# Patient Record
Sex: FEMALE | Race: WHITE | NOT HISPANIC OR LATINO | Employment: FULL TIME
[De-identification: names, ages, dates, MRNs, and addresses within clinical notes are randomized per-mention and may not be internally consistent; named-entity substitution may affect disease eponyms.]

---

## 2017-08-19 ENCOUNTER — HEALTH MAINTENANCE LETTER (OUTPATIENT)
Age: 27
End: 2017-08-19

## 2019-11-06 ENCOUNTER — HEALTH MAINTENANCE LETTER (OUTPATIENT)
Age: 29
End: 2019-11-06

## 2020-11-29 ENCOUNTER — HEALTH MAINTENANCE LETTER (OUTPATIENT)
Age: 30
End: 2020-11-29

## 2021-09-19 ENCOUNTER — HEALTH MAINTENANCE LETTER (OUTPATIENT)
Age: 31
End: 2021-09-19

## 2022-01-09 ENCOUNTER — HEALTH MAINTENANCE LETTER (OUTPATIENT)
Age: 32
End: 2022-01-09

## 2022-09-29 ENCOUNTER — HOSPITAL ENCOUNTER (EMERGENCY)
Facility: CLINIC | Age: 32
Discharge: HOME | End: 2022-09-30
Attending: EMERGENCY MEDICINE | Admitting: EMERGENCY MEDICINE
Payer: COMMERCIAL

## 2022-09-29 ENCOUNTER — APPOINTMENT (OUTPATIENT)
Dept: ULTRASOUND IMAGING | Facility: CLINIC | Age: 32
End: 2022-09-29
Attending: EMERGENCY MEDICINE
Payer: COMMERCIAL

## 2022-09-29 DIAGNOSIS — R55 NEAR SYNCOPE: ICD-10-CM

## 2022-09-29 DIAGNOSIS — R10.32 CHRONIC GROIN PAIN, LEFT: ICD-10-CM

## 2022-09-29 DIAGNOSIS — G89.29 CHRONIC GROIN PAIN, LEFT: ICD-10-CM

## 2022-09-29 DIAGNOSIS — N92.1 METRORRHAGIA: ICD-10-CM

## 2022-09-29 LAB
ALBUMIN SERPL BCG-MCNC: 3.9 G/DL (ref 3.5–5.2)
ALP SERPL-CCNC: 89 U/L (ref 35–104)
ALT SERPL W P-5'-P-CCNC: 14 U/L (ref 10–35)
ANION GAP SERPL CALCULATED.3IONS-SCNC: 11 MMOL/L (ref 7–15)
AST SERPL W P-5'-P-CCNC: 20 U/L (ref 10–35)
BASOPHILS # BLD AUTO: 0.1 10E3/UL (ref 0–0.2)
BASOPHILS NFR BLD AUTO: 1 %
BILIRUB SERPL-MCNC: 0.3 MG/DL
BUN SERPL-MCNC: 8 MG/DL (ref 6–20)
CALCIUM SERPL-MCNC: 8.6 MG/DL (ref 8.6–10)
CHLORIDE SERPL-SCNC: 102 MMOL/L (ref 98–107)
CREAT SERPL-MCNC: 0.69 MG/DL (ref 0.51–0.95)
DEPRECATED HCO3 PLAS-SCNC: 24 MMOL/L (ref 22–29)
EOSINOPHIL # BLD AUTO: 0.2 10E3/UL (ref 0–0.7)
EOSINOPHIL NFR BLD AUTO: 2 %
ERYTHROCYTE [DISTWIDTH] IN BLOOD BY AUTOMATED COUNT: 12.3 % (ref 10–15)
GFR SERPL CREATININE-BSD FRML MDRD: >90 ML/MIN/1.73M2
GLUCOSE SERPL-MCNC: 97 MG/DL (ref 70–99)
HCG SERPL QL: NEGATIVE
HCT VFR BLD AUTO: 37.6 % (ref 35–47)
HGB BLD-MCNC: 12.4 G/DL (ref 11.7–15.7)
IMM GRANULOCYTES # BLD: 0 10E3/UL
IMM GRANULOCYTES NFR BLD: 0 %
LYMPHOCYTES # BLD AUTO: 2.5 10E3/UL (ref 0.8–5.3)
LYMPHOCYTES NFR BLD AUTO: 30 %
MAGNESIUM SERPL-MCNC: 2 MG/DL (ref 1.7–2.3)
MCH RBC QN AUTO: 28.4 PG (ref 26.5–33)
MCHC RBC AUTO-ENTMCNC: 33 G/DL (ref 31.5–36.5)
MCV RBC AUTO: 86 FL (ref 78–100)
MONOCYTES # BLD AUTO: 0.5 10E3/UL (ref 0–1.3)
MONOCYTES NFR BLD AUTO: 6 %
NEUTROPHILS # BLD AUTO: 5 10E3/UL (ref 1.6–8.3)
NEUTROPHILS NFR BLD AUTO: 61 %
NRBC # BLD AUTO: 0 10E3/UL
NRBC BLD AUTO-RTO: 0 /100
PLATELET # BLD AUTO: 358 10E3/UL (ref 150–450)
POTASSIUM SERPL-SCNC: 4.7 MMOL/L (ref 3.4–5.3)
PROT SERPL-MCNC: 6.6 G/DL (ref 6.4–8.3)
RBC # BLD AUTO: 4.36 10E6/UL (ref 3.8–5.2)
SODIUM SERPL-SCNC: 137 MMOL/L (ref 136–145)
TSH SERPL DL<=0.005 MIU/L-ACNC: 3.93 UIU/ML (ref 0.3–4.2)
WBC # BLD AUTO: 8.2 10E3/UL (ref 4–11)

## 2022-09-29 PROCEDURE — 80053 COMPREHEN METABOLIC PANEL: CPT | Performed by: EMERGENCY MEDICINE

## 2022-09-29 PROCEDURE — 258N000003 HC RX IP 258 OP 636: Performed by: EMERGENCY MEDICINE

## 2022-09-29 PROCEDURE — 84703 CHORIONIC GONADOTROPIN ASSAY: CPT | Performed by: EMERGENCY MEDICINE

## 2022-09-29 PROCEDURE — 96360 HYDRATION IV INFUSION INIT: CPT | Performed by: EMERGENCY MEDICINE

## 2022-09-29 PROCEDURE — 85025 COMPLETE CBC W/AUTO DIFF WBC: CPT | Performed by: EMERGENCY MEDICINE

## 2022-09-29 PROCEDURE — 93971 EXTREMITY STUDY: CPT | Mod: LT

## 2022-09-29 PROCEDURE — 93010 ELECTROCARDIOGRAM REPORT: CPT | Performed by: EMERGENCY MEDICINE

## 2022-09-29 PROCEDURE — 96361 HYDRATE IV INFUSION ADD-ON: CPT | Performed by: EMERGENCY MEDICINE

## 2022-09-29 PROCEDURE — 93005 ELECTROCARDIOGRAM TRACING: CPT | Performed by: EMERGENCY MEDICINE

## 2022-09-29 PROCEDURE — 99285 EMERGENCY DEPT VISIT HI MDM: CPT | Mod: 25 | Performed by: EMERGENCY MEDICINE

## 2022-09-29 PROCEDURE — 36415 COLL VENOUS BLD VENIPUNCTURE: CPT | Performed by: EMERGENCY MEDICINE

## 2022-09-29 PROCEDURE — 83735 ASSAY OF MAGNESIUM: CPT | Performed by: EMERGENCY MEDICINE

## 2022-09-29 PROCEDURE — 84443 ASSAY THYROID STIM HORMONE: CPT | Performed by: EMERGENCY MEDICINE

## 2022-09-29 RX ADMIN — SODIUM CHLORIDE 1000 ML: 9 INJECTION, SOLUTION INTRAVENOUS at 21:48

## 2022-09-29 ASSESSMENT — ACTIVITIES OF DAILY LIVING (ADL)
ADLS_ACUITY_SCORE: 33
ADLS_ACUITY_SCORE: 35

## 2022-09-29 NOTE — ED TRIAGE NOTES
Left hip pain, radiates up the back and down the leg, known issues plans to have surgery; concerned because today had an episode of near-syncope, stood up out of the car and began walking, felt light headed, increased heart rate     Triage Assessment     Row Name 09/29/22 6579       Triage Assessment (Adult)    Airway WDL WDL       Cardiac WDL    Cardiac WDL WDL       Cognitive/Neuro/Behavioral WDL    Cognitive/Neuro/Behavioral WDL WDL

## 2022-09-30 VITALS
HEART RATE: 84 BPM | OXYGEN SATURATION: 100 % | SYSTOLIC BLOOD PRESSURE: 105 MMHG | HEIGHT: 62 IN | BODY MASS INDEX: 44.53 KG/M2 | RESPIRATION RATE: 18 BRPM | DIASTOLIC BLOOD PRESSURE: 60 MMHG | TEMPERATURE: 98.2 F | WEIGHT: 242 LBS

## 2022-09-30 RX ORDER — TRAMADOL HYDROCHLORIDE 50 MG/1
50 TABLET ORAL EVERY 6 HOURS PRN
Qty: 15 TABLET | Refills: 0 | Status: SHIPPED | OUTPATIENT
Start: 2022-09-30 | End: 2023-06-02

## 2022-09-30 NOTE — ED PROVIDER NOTES
History     Chief Complaint   Patient presents with     Hip Pain     Left hip pain, radiates up the back and down the leg, known issues plans to have surgery; concerned because today had an episode of near-syncope, stood up out of the car and began walking, felt light headed, increased heart rate     HPI  Paula Back is a 32 year old female with history of anxiety, depression and 10 + years of left groin pain radiating to the left leg and foot with referral to the ED by her PCC where she presented for pre-op eval for tubal ligation and had an episode of near syncope when she approached the desk at clinic. Vision darkened from periphery. No syncope, felt like heart was racing and it was 120. She felt foggy and confused and scared. EKG at the clinic was reported to be NL.  She was referred to the emergency department for further evaluation. Groin pain is dull, aching, throbbing, sharp, worsened in the past 4 days, with no known precipitant for its acute aggravation/exacerbation.  Pain radiates to the medial left upper leg and posterior left lower leg and to the left foot.  Pain also radiates to the left low back.  Pain is constant, severe and refractory to OTC analgesics.  She had difficulty sleeping last night secondary to pain.  She mentions that prior imaging studies showed evidence of phleboliths and she is concerned that the pain rating into the left leg, which she has had for 10 years, is secondary to a blood clot.  No leg swelling, redness, chest pain, cough, hemoptysis or shortness of breath. She just had an MRI study for these chronic symptoms ([facility] facility, ? pelvic MRI), but results not yet available (I reviewed Care Everywhere).  She also reports 4 days of painless uterine/vaginal bleeding with passage of clots. LMP 9/7/22 = NL.    Allergies:  No Known Allergies    Problem List:    Patient Active Problem List    Diagnosis Date Noted     Moderate major depression (H) 04/26/2013     Priority:  Medium     Generalized anxiety disorder 04/26/2013     Priority: Medium     Diagnosis updated by automated process. Provider to review and confirm.       Obese 02/06/2013     Priority: Medium     SOB (shortness of breath) 07/24/2012     Priority: Medium     24 hour contact handout given 07/24/2012     Priority: Medium     EMERGENCY CARE PLAN  Presenting Problem Signs and Symptoms Treatment Plan    Questions or conerns during clinic hours    I will call the clinic directly     Questions or conerns outside clinic hours    I will call the 24 hour nurse line at [phone]    Patient needs to schedule an appointment    I will call the 24 hour scheduling team at [phone] or clinic directly    Same day treatment     I will call the clinic first, nurse line if after hours, urgent care and express care if needed                                 CARDIOVASCULAR SCREENING; LDL GOAL LESS THAN 160 12/28/2011     Priority: Medium     Insomnia 05/17/2011     Priority: Medium     Advanced directives, counseling/discussion 03/25/2011     Priority: Medium     Patient does not have an Advance/Health Care Directive (HCD), declines information/referral.    Julia Meyer  March 25, 2011         Papanicolaou smear of cervix with low grade squamous intraepithelial lesion (LGSIL) 09/20/2009     Priority: Medium     9/16/09:Pap--LSIL. Per ASCCP guidelines, repeat pap 1 year.  11/2010:Per current guidelines, no further pap testing needed until age 21.  Being followed at [facility]  12/21/11:Pap--NIL  2/6/13:Pap--NIL  1/15/14: Pap - NIL. Repeat pap 3 yrs.        Family hx of alcoholism 04/06/2009     Priority: Medium     Both parents       Acne 11/12/2008     Priority: Medium     Contraceptive management 07/24/2006     Priority: Medium     Problem list name updated by automated process. Provider to review          Past Medical History:    History reviewed. No pertinent past medical history.    Past Surgical History:    History  "reviewed. No pertinent surgical history.    Family History:    Family History   Problem Relation Age of Onset     Diabetes Maternal Grandfather      Cancer Maternal Grandmother         lung      Cancer Maternal Grandfather         father     Alcohol/Drug Father      Alcohol/Drug Mother        Social History:  Marital Status:   [2]  Social History     Tobacco Use     Smoking status: Former Smoker     Packs/day: 0.25     Types: Cigarettes     Smokeless tobacco: Never Used     Tobacco comment: she is not exposed to tobacco at home   Substance Use Topics     Alcohol use: Yes     Comment: occas.  10 drinks through the week     Drug use: No        Medications:    traMADol (ULTRAM) 50 MG tablet  albuterol (PROAIR HFA, PROVENTIL HFA, VENTOLIN HFA) 108 (90 BASE) MCG/ACT inhaler  citalopram (CELEXA) 20 MG tablet  Ibuprofen 200 MG capsule  MAGNESIUM PO  melatonin 5 MG tablet  Multiple Vitamin (MULTIVITAMINS PO)  predniSONE (DELTASONE) 20 MG tablet  Pseudoephedrine-DM-GG-APAP (DAYQUIL LIQUICAPS OR)  RA KRILL  MG CAPS        Review of Systems  As mentioned above in the history present illness.  All other systems were reviewed and are negative.    Physical Exam   BP: 133/89  Pulse: 92  Temp: 98.2  F (36.8  C)  Resp: 16  Height: 157.5 cm (5' 2\")  Weight: 109.8 kg (242 lb)  SpO2: 100 %  Lying Orthostatic BP: 100/66  Lying Orthostatic Pulse: 94 bpm  Sitting Orthostatic BP: 121/79  Sitting Orthostatic Pulse: 79 bpm  Standing Orthostatic BP: 117/78  Standing Orthostatic Pulse: 88 bpm      Physical Exam  Vitals and nursing note reviewed.   Constitutional:       General: She is not in acute distress.     Appearance: Normal appearance. She is well-developed. She is not ill-appearing or diaphoretic.   HENT:      Head: Normocephalic and atraumatic.      Right Ear: External ear normal.      Left Ear: External ear normal.      Nose: Nose normal.      Mouth/Throat:      Mouth: Mucous membranes are moist.   Eyes:      General: No " scleral icterus.     Extraocular Movements: Extraocular movements intact.      Conjunctiva/sclera: Conjunctivae normal.   Neck:      Trachea: No tracheal deviation.   Cardiovascular:      Rate and Rhythm: Normal rate and regular rhythm.      Heart sounds: Normal heart sounds. No murmur heard.    No friction rub. No gallop.   Pulmonary:      Effort: Pulmonary effort is normal. No respiratory distress.      Breath sounds: Normal breath sounds. No rales.   Abdominal:      General: There is no distension.      Palpations: Abdomen is soft.      Tenderness: There is no abdominal tenderness. There is no right CVA tenderness or left CVA tenderness.   Musculoskeletal:         General: Tenderness ( poorly locaized left LBP and left groin pain with no STS, erthema, warmth, adenopathy or pulse abnlty) present. No swelling. Normal range of motion.      Cervical back: Normal range of motion and neck supple.      Right lower leg: No edema.      Left lower leg: No edema (no LLE STS, cords or abnlty).   Skin:     General: Skin is warm and dry.      Coloration: Skin is not pale.      Findings: No erythema or rash.   Neurological:      General: No focal deficit present.      Mental Status: She is alert and oriented to person, place, and time.      Sensory: No sensory deficit.      Motor: No weakness.   Psychiatric:         Mood and Affect: Mood normal.         Behavior: Behavior normal.         ED Course             Procedures         EKG Interpretation:      Interpreted by Clayton Padilla MD  Time reviewed: Upon completion  Symptoms at time of EKG: None status post near syncopal episode  Rhythm: Normal sinus   Rate: Normal  Axis: Normal  Ectopy: None  Conduction: Normal  ST Segments/ T Waves: No ST-T wave changes and No acute ischemic changes  Q Waves: None  Comparison to prior: No old EKG available  Clinical Impression: normal EKG              Results for orders placed or performed during the hospital encounter of 09/29/22 (from  the past 24 hour(s))   HCG qualitative Blood   Result Value Ref Range    hCG Serum Qualitative Negative Negative   CBC with platelets differential    Narrative    The following orders were created for panel order CBC with platelets differential.  Procedure                               Abnormality         Status                     ---------                               -----------         ------                     CBC with platelets and d...[[id number]]                      Final result                 Please view results for these tests on the individual orders.   Comprehensive metabolic panel   Result Value Ref Range    Sodium 137 136 - 145 mmol/L    Potassium 4.7 3.4 - 5.3 mmol/L    Chloride 102 98 - 107 mmol/L    Carbon Dioxide (CO2) 24 22 - 29 mmol/L    Anion Gap 11 7 - 15 mmol/L    Urea Nitrogen 8.0 6.0 - 20.0 mg/dL    Creatinine 0.69 0.51 - 0.95 mg/dL    Calcium 8.6 8.6 - 10.0 mg/dL    Glucose 97 70 - 99 mg/dL    Alkaline Phosphatase 89 35 - 104 U/L    AST 20 10 - 35 U/L    ALT 14 10 - 35 U/L    Protein Total 6.6 6.4 - 8.3 g/dL    Albumin 3.9 3.5 - 5.2 g/dL    Bilirubin Total 0.3 <=1.2 mg/dL    GFR Estimate >90 >60 mL/min/1.73m2   Magnesium   Result Value Ref Range    Magnesium 2.0 1.7 - 2.3 mg/dL   TSH with free T4 reflex   Result Value Ref Range    TSH 3.93 0.30 - 4.20 uIU/mL   CBC with platelets and differential   Result Value Ref Range    WBC Count 8.2 4.0 - 11.0 10e3/uL    RBC Count 4.36 3.80 - 5.20 10e6/uL    Hemoglobin 12.4 11.7 - 15.7 g/dL    Hematocrit 37.6 35.0 - 47.0 %    MCV 86 78 - 100 fL    MCH 28.4 26.5 - 33.0 pg    MCHC 33.0 31.5 - 36.5 g/dL    RDW 12.3 10.0 - 15.0 %    Platelet Count 358 150 - 450 10e3/uL    % Neutrophils 61 %    % Lymphocytes 30 %    % Monocytes 6 %    % Eosinophils 2 %    % Basophils 1 %    % Immature Granulocytes 0 %    NRBCs per 100 WBC 0 <1 /100    Absolute Neutrophils 5.0 1.6 - 8.3 10e3/uL    Absolute Lymphocytes 2.5 0.8 - 5.3 10e3/uL    Absolute Monocytes 0.5 0.0 -  1.3 10e3/uL    Absolute Eosinophils 0.2 0.0 - 0.7 10e3/uL    Absolute Basophils 0.1 0.0 - 0.2 10e3/uL    Absolute Immature Granulocytes 0.0 <=0.4 10e3/uL    Absolute NRBCs 0.0 10e3/uL   US Lower Extremity Venous Duplex Left    Narrative    EXAM: US LOWER EXTREMITY VENOUS DUPLEX LEFT  LOCATION: [facility]  DATE/TIME: 9/29/2022 10:31 PM    INDICATION: atraumatic LLE pain  COMPARISON: None.  TECHNIQUE: Venous Duplex ultrasound of the left lower extremity with and without compression, augmentation and duplex. Color flow and spectral Doppler with waveform analysis performed.    FINDINGS: Exam includes the common femoral, femoral, popliteal, and contralateral common femoral veins as well as segmentally visualized deep calf veins and greater saphenous vein.     LEFT: No deep vein thrombosis. No superficial thrombophlebitis. No popliteal cyst.      Impression    IMPRESSION:  1.  No deep venous thrombosis in the left lower extremity.       Medications   0.9% sodium chloride BOLUS (0 mLs Intravenous Stopped 9/30/22 0025)     At time of discharge she reports that the vaginal/uterine bleeding is significant subsided and is minimal now.  We will defer treatment of this, she is comfortable with this.     Assessments & Plan (with Medical Decision Making)   32 year old female with history of anxiety, depression and 10 + years of left groin pain radiating to the left leg and foot with referral to the ED by her PCC where she presented for pre-op eval for tubal ligation and had an episode of near syncope when she approached the desk at clinic. Vision darkened from periphery. No syncope, felt like heart was racing and it was 120. She felt foggy and confused and scared. EKG at the clinic was reported to be NL.  She was referred to the emergency department for further evaluation. ED eval unremarkable and I believe this was a vasovagal near syncopal episode. Doubt atypical ACS, dysrhythmia, PE/DVT, seizure,  TIA, etc. She has chronic groin pain of unclear etiology and exam benign. She states prior imaging studies showed evidence of phleboliths and she is concerned that the pain rating into the left leg, which she has had for 10 years, is secondary to a blood clot. She just had an MRI study for these chronic symptoms ([facility] facility, ? pelvic MRI), but results not yet available (I reviewed Care Everywhere). Today LLE US is negative for DVT. She also reports 4 days of uterine/vaginal bleeding with passage of clots. LMP 9/7/22 = NL. At time of discharge she reports that the vaginal/uterine bleeding is significant subsided and is minimal now.  We will defer treatment of this, and she will f/u with her PMD and Ob/GYN.  She was provided instructions for supportive care and will return as needed for worsened condition or worsening symptoms, or new problems or concerns. Will rx a small # of Tramadol for pain, per her request.    I have reviewed the nursing notes.    I have reviewed the findings, diagnosis, plan and need for follow up with the patient.    New Prescriptions    TRAMADOL (ULTRAM) 50 MG TABLET    Take 1 tablet (50 mg) by mouth every 6 hours as needed for severe pain       Final diagnoses:   Near syncope   Chronic groin pain, left - Radiating into the left leg and left low back   Metrorrhagia       9/29/2022   [facility] EMERGENCY DEPT     Valerie, Clayton MUHAMMAD MD  10/02/22 0842

## 2022-11-21 ENCOUNTER — HEALTH MAINTENANCE LETTER (OUTPATIENT)
Age: 32
End: 2022-11-21

## 2023-02-24 ENCOUNTER — HOSPITAL ENCOUNTER (EMERGENCY)
Facility: CLINIC | Age: 33
Discharge: HOME | End: 2023-02-24
Attending: EMERGENCY MEDICINE | Admitting: EMERGENCY MEDICINE
Payer: COMMERCIAL

## 2023-02-24 ENCOUNTER — APPOINTMENT (OUTPATIENT)
Dept: CT IMAGING | Facility: CLINIC | Age: 33
End: 2023-02-24
Attending: EMERGENCY MEDICINE
Payer: COMMERCIAL

## 2023-02-24 ENCOUNTER — TELEPHONE (OUTPATIENT)
Dept: OTOLARYNGOLOGY | Facility: CLINIC | Age: 33
End: 2023-02-24

## 2023-02-24 VITALS
WEIGHT: 242 LBS | HEART RATE: 107 BPM | OXYGEN SATURATION: 94 % | DIASTOLIC BLOOD PRESSURE: 91 MMHG | SYSTOLIC BLOOD PRESSURE: 120 MMHG | HEIGHT: 62 IN | TEMPERATURE: 98.6 F | RESPIRATION RATE: 16 BRPM | BODY MASS INDEX: 44.53 KG/M2

## 2023-02-24 DIAGNOSIS — J02.9 PHARYNGITIS, UNSPECIFIED ETIOLOGY: ICD-10-CM

## 2023-02-24 DIAGNOSIS — M54.2 NECK PAIN: ICD-10-CM

## 2023-02-24 DIAGNOSIS — R09.A2 GLOBUS SENSATION: ICD-10-CM

## 2023-02-24 DIAGNOSIS — R13.10 DYSPHAGIA, UNSPECIFIED TYPE: Primary | ICD-10-CM

## 2023-02-24 LAB
ANION GAP SERPL CALCULATED.3IONS-SCNC: 9 MMOL/L (ref 7–15)
BASOPHILS # BLD AUTO: 0 10E3/UL (ref 0–0.2)
BASOPHILS NFR BLD AUTO: 1 %
BUN SERPL-MCNC: 5.3 MG/DL (ref 6–20)
CALCIUM SERPL-MCNC: 8.9 MG/DL (ref 8.6–10)
CHLORIDE SERPL-SCNC: 103 MMOL/L (ref 98–107)
CREAT SERPL-MCNC: 0.71 MG/DL (ref 0.51–0.95)
DEPRECATED HCO3 PLAS-SCNC: 25 MMOL/L (ref 22–29)
EOSINOPHIL # BLD AUTO: 0.1 10E3/UL (ref 0–0.7)
EOSINOPHIL NFR BLD AUTO: 2 %
ERYTHROCYTE [DISTWIDTH] IN BLOOD BY AUTOMATED COUNT: 12.6 % (ref 10–15)
GFR SERPL CREATININE-BSD FRML MDRD: >90 ML/MIN/1.73M2
GLUCOSE SERPL-MCNC: 94 MG/DL (ref 70–99)
HCT VFR BLD AUTO: 34.5 % (ref 35–47)
HGB BLD-MCNC: 12.1 G/DL (ref 11.7–15.7)
IMM GRANULOCYTES # BLD: 0 10E3/UL
IMM GRANULOCYTES NFR BLD: 0 %
LYMPHOCYTES # BLD AUTO: 2.4 10E3/UL (ref 0.8–5.3)
LYMPHOCYTES NFR BLD AUTO: 33 %
MCH RBC QN AUTO: 30 PG (ref 26.5–33)
MCHC RBC AUTO-ENTMCNC: 35.1 G/DL (ref 31.5–36.5)
MCV RBC AUTO: 85 FL (ref 78–100)
MONOCYTES # BLD AUTO: 0.5 10E3/UL (ref 0–1.3)
MONOCYTES NFR BLD AUTO: 7 %
NEUTROPHILS # BLD AUTO: 4 10E3/UL (ref 1.6–8.3)
NEUTROPHILS NFR BLD AUTO: 57 %
NRBC # BLD AUTO: 0 10E3/UL
NRBC BLD AUTO-RTO: 0 /100
PLATELET # BLD AUTO: 339 10E3/UL (ref 150–450)
POTASSIUM SERPL-SCNC: 4.3 MMOL/L (ref 3.4–5.3)
RBC # BLD AUTO: 4.04 10E6/UL (ref 3.8–5.2)
SODIUM SERPL-SCNC: 137 MMOL/L (ref 136–145)
WBC # BLD AUTO: 7 10E3/UL (ref 4–11)

## 2023-02-24 PROCEDURE — 250N000011 HC RX IP 250 OP 636: Performed by: EMERGENCY MEDICINE

## 2023-02-24 PROCEDURE — 85025 COMPLETE CBC W/AUTO DIFF WBC: CPT | Performed by: EMERGENCY MEDICINE

## 2023-02-24 PROCEDURE — 96375 TX/PRO/DX INJ NEW DRUG ADDON: CPT | Mod: 59 | Performed by: EMERGENCY MEDICINE

## 2023-02-24 PROCEDURE — 36415 COLL VENOUS BLD VENIPUNCTURE: CPT | Performed by: EMERGENCY MEDICINE

## 2023-02-24 PROCEDURE — 80048 BASIC METABOLIC PNL TOTAL CA: CPT | Performed by: EMERGENCY MEDICINE

## 2023-02-24 PROCEDURE — 96374 THER/PROPH/DIAG INJ IV PUSH: CPT | Mod: 59 | Performed by: EMERGENCY MEDICINE

## 2023-02-24 PROCEDURE — 99285 EMERGENCY DEPT VISIT HI MDM: CPT | Mod: 25 | Performed by: EMERGENCY MEDICINE

## 2023-02-24 PROCEDURE — 70491 CT SOFT TISSUE NECK W/DYE: CPT

## 2023-02-24 PROCEDURE — 99207 PR NO CHARGE LOS: CPT | Performed by: OTOLARYNGOLOGY

## 2023-02-24 PROCEDURE — 99284 EMERGENCY DEPT VISIT MOD MDM: CPT | Performed by: EMERGENCY MEDICINE

## 2023-02-24 PROCEDURE — 250N000009 HC RX 250: Performed by: EMERGENCY MEDICINE

## 2023-02-24 RX ORDER — LORAZEPAM 2 MG/ML
1 INJECTION INTRAMUSCULAR ONCE
Status: COMPLETED | OUTPATIENT
Start: 2023-02-24 | End: 2023-02-24

## 2023-02-24 RX ORDER — DEXAMETHASONE SODIUM PHOSPHATE 4 MG/ML
10 VIAL (ML) INJECTION ONCE
Status: COMPLETED | OUTPATIENT
Start: 2023-02-24 | End: 2023-02-24

## 2023-02-24 RX ORDER — IOPAMIDOL 755 MG/ML
90 INJECTION, SOLUTION INTRAVASCULAR ONCE
Status: COMPLETED | OUTPATIENT
Start: 2023-02-24 | End: 2023-02-24

## 2023-02-24 RX ORDER — KETOROLAC TROMETHAMINE 15 MG/ML
15 INJECTION, SOLUTION INTRAMUSCULAR; INTRAVENOUS ONCE
Status: COMPLETED | OUTPATIENT
Start: 2023-02-24 | End: 2023-02-24

## 2023-02-24 RX ADMIN — DEXAMETHASONE SODIUM PHOSPHATE 10 MG: 4 INJECTION, SOLUTION INTRAMUSCULAR; INTRAVENOUS at 03:37

## 2023-02-24 RX ADMIN — LORAZEPAM 1 MG: 2 INJECTION INTRAMUSCULAR; INTRAVENOUS at 05:30

## 2023-02-24 RX ADMIN — SODIUM CHLORIDE 80 ML: 9 INJECTION, SOLUTION INTRAVENOUS at 02:33

## 2023-02-24 RX ADMIN — IOPAMIDOL 90 ML: 755 INJECTION, SOLUTION INTRAVENOUS at 02:33

## 2023-02-24 RX ADMIN — KETOROLAC TROMETHAMINE 15 MG: 15 INJECTION, SOLUTION INTRAMUSCULAR; INTRAVENOUS at 02:26

## 2023-02-24 ASSESSMENT — ACTIVITIES OF DAILY LIVING (ADL)
ADLS_ACUITY_SCORE: 35

## 2023-02-24 ASSESSMENT — ENCOUNTER SYMPTOMS
LIGHT-HEADEDNESS: 0
FEVER: 0
CHILLS: 0
FATIGUE: 0
APPETITE CHANGE: 0
HEADACHES: 0
SORE THROAT: 1
TROUBLE SWALLOWING: 1
ABDOMINAL PAIN: 0
BACK PAIN: 0
CHEST TIGHTNESS: 0
VOICE CHANGE: 1

## 2023-02-24 NOTE — TELEPHONE ENCOUNTER
[facility] Call Center    Phone Message    May a detailed message be left on voicemail: no     Reason for Call: Question regarding specialist protocol  Please follow protocols- only utilize this documentation for questions or concerns that are not clear in the protocol.  Contact clinic directly to clarify question(s) via phone or [employer] message.     Was Clinic Available: no    Question regarding protocol:  Neck pain / Neck pain, abnormal CT of neck     Is there a referral for the requested specialist/specialty? Yes    Name of referring provider: Sachin West MD    Location of referring provider: see above      Action Taken: Message routed to:  [facility] winnie ent    Travel Screening: Not Applicable

## 2023-02-24 NOTE — ED NOTES
"Writer went into pt's room to meet her for the morning. Pt was crying, stating that the ENT doctor reported assessment showed acid reflux. Pt states that she is still having throat pain and difficulty swallowing and that she knows \"this is not reflux\". Pt reports that she does not feel comfortable being discharged and will \"just go to another ER\".   MD notified and will speak with pt.   "

## 2023-02-24 NOTE — OP NOTE
Chief Complaint   Patient presents with     Neck Pain     Pain in throat-  if she relaxes her neck muscles it travels down he neck     History of Present Illness  Paula Back is a 32 year old female who presented to the emergency department early this morning with throat pain and trouble swallowing for the past 3 days.  Her past history is significant for recently identified Chiari malformation.  She has an upcoming appoint with neurology at [facility] in about 1 month.  She is able to swallow her secretions.  She is been able to stay hydrated, liquid seem to be fine.  She reports throat pain and discomfort, throat fullness, and sense of a lump in her throat.  Denies a history of acid indigestion or heartburn.  She feels like she gets chest pressure when she lays down at night to sleep.  Her BMI today is 44 kg/m .  She denies any otalgia, hemoptysis, obvious external neck lumps/bumps/swelling.  No history of unintentional weight loss.  She is a nontobacco user.    The patient underwent CT imaging of the neck with contrast performed earlier today.  My review of the neck CT with contrast shows a normal upper aerodigestive tract without any masses or lesions.  Normal pharyngeal and oropharyngeal tonsil tissue.  Normal epiglottis and supraglottic structures.  No signs of neck mass or adenopathy.    Past Medical History  Patient Active Problem List   Diagnosis     Contraceptive management     Acne     Family hx of alcoholism     Papanicolaou smear of cervix with low grade squamous intraepithelial lesion (LGSIL)     Advanced directives, counseling/discussion     Insomnia     CARDIOVASCULAR SCREENING; LDL GOAL LESS THAN 160     SOB (shortness of breath)     24 hour contact handout given     Obese     Moderate major depression (H)     Generalized anxiety disorder     Current Medications  No current facility-administered medications for this encounter.    Current Outpatient Medications:      albuterol (PROAIR HFA,  PROVENTIL HFA, VENTOLIN HFA) 108 (90 BASE) MCG/ACT inhaler, Inhale 2 puffs into the lungs every 6 hours as needed for shortness of breath / dyspnea or wheezing, Disp: 1 Inhaler, Rfl: 0     citalopram (CELEXA) 20 MG tablet, Take 1.5 tablets (30 mg) by mouth daily DUE for office appt with Reji before any further refills, Disp: 45 tablet, Rfl: 0     Ibuprofen 200 MG capsule, Take 400 mg by mouth every 4 hours as needed for fever., Disp: 100 capsule, Rfl: 0     MAGNESIUM PO, Take by mouth At Bedtime, Disp: , Rfl:      melatonin 5 MG tablet, Take 5 mg by mouth At Bedtime, Disp: , Rfl:      Multiple Vitamin (MULTIVITAMINS PO), Take by mouth daily 1/2 tab, Disp: , Rfl:      predniSONE (DELTASONE) 20 MG tablet, Take 3 tablets (60 mg) by mouth daily, Disp: 15 tablet, Rfl: 0     Pseudoephedrine-DM-GG-APAP (DAYQUIL LIQUICAPS OR), , Disp: , Rfl:      RA KRILL  MG CAPS, , Disp: , Rfl:      traMADol (ULTRAM) 50 MG tablet, Take 1 tablet (50 mg) by mouth every 6 hours as needed for severe pain, Disp: 15 tablet, Rfl: 0    Allergies  No Known Allergies    Social History  Social History     Socioeconomic History     Marital status:    Tobacco Use     Smoking status: Former     Packs/day: 0.25     Types: Cigarettes     Smokeless tobacco: Never     Tobacco comments:     she is not exposed to tobacco at home   Substance and Sexual Activity     Alcohol use: Yes     Comment: occas.  10 drinks through the week     Drug use: No     Sexual activity: Yes     Partners: Male     Birth control/protection: Condom, Injection   Other Topics Concern     Parent/sibling w/ CABG, MI or angioplasty before 65F 55M? No   Social History Narrative    Works at [employer] on 4:30-1 AM shift       Family History  Family History   Problem Relation Age of Onset     Diabetes Maternal Grandfather      Cancer Maternal Grandmother         lung      Cancer Maternal Grandfather         father     Alcohol/Drug Father      Alcohol/Drug Mother   "      Review of Systems  As per HPI and PMHx, otherwise 10 system review including the head and neck, constitutional, eyes, respiratory, GI, skin, neurologic, lymphatic, endocrine, and allergy systems is negative.    Physical Exam  /88   Pulse 96   Temp 98.6  F (37  C) (Oral)   Resp 16   Ht 1.575 m (5' 2\")   Wt 109.8 kg (242 lb)   LMP 02/03/2023 (Approximate)   SpO2 96%   BMI 44.26 kg/m    GENERAL: Patient is a pleasant, cooperative 32 year old female in no acute distress.  HEAD: Normocephalic, atraumatic.  Hair and scalp are normal.  EYES: Pupils are equal, round, reactive to light and accommodation.  Extraocular movements are intact.  The sclera nonicteric without injection.  The extraocular structures are normal.  EARS: Normal shape and symmetry.  No tenderness when palpating the mastoid or tragal areas bilaterally.   NOSE: Nares are patent.  Nasal mucosa is pink and moist.  Negative anterior rhinoscopy.  ORAL CAVITY: Dentition is in good repair.  Mucous membranes are moist.  Tongue is mobile, protrudes to the midline.  Palate elevates symmetrically.  Right tonsil is 2+, left tonsil is 1+.  No erythema or exudate.  No oral cavity or oropharyngeal masses, lesions, ulcerations, leukoplakia.  NECK: Supple, trachea is midline.  There no palpable cervical lymphadenopathy or masses bilaterally.    NEUROLOGIC: Cranial nerves II through XII are grossly intact.  Voice is strong.  Patient is House-Brackmann I/VI bilaterally.  CARDIOVASCULAR: Extremities are warm and well-perfused.  No significant peripheral edema.  RESPIRATORY: Patient has nonlabored breathing without cough, wheeze, stridor.  PSYCHIATRIC: Patient is alert and oriented.  Mood and affect appear normal.  SKIN: Warm and dry.  No scalp, face, or neck lesions noted.    Procedure: Flexible Laryngoscopy  Indication: Dysphagia, throat discomfort, globus    To best visualize the upper airway anatomy and due to the chief complaint and HPI, I proceeded " with flexible fiberoptic laryngoscopy examination.  The bilateral nasal cavities were anesthetized and decongested with a mixture of lidocaine and neosynephrine.  The bilateral nasal cavities were examined using a flexible fiberoptic laryngoscope.  There were no nasal cavity masses, polyps, or mucopurulence bilaterally.  The nasopharynx had a normal appearance with normal Eustachian tube openings and fossa of Rosenmuller bilaterally.  Minimal adenoid tissue.  The patient has moderately severe oropharyngeal cobblestoning that extends down to the piriforms.  The base of tongue, vallecula, epiglottis, aryepiglottic folds, arytenoids, and piriform sinuses were without mass or lesion.  The patient has some moderate interarytenoid thickening and amount amount of posterior edema.  The bilateral true vocal folds were symmetrically mobile without nodules or masses.  At points during the examination, she had some mild paradoxical vocal fold motion.  The visualized portions of the infraglottic and subglottic airway are unremarkable.  The scope was removed.  The patient tolerated the procedure well.                        Assessment and Plan     ICD-10-CM    1. Neck pain  M54.2 Adult ENT  Referral      2. Pharyngitis, unspecified etiology  J02.9         Paula TIERNEY Running today in the emergency department for consultation.  The patient is having symptoms of dysphagia and throat discomfort.  Her endoscopic exam did show some mild cobblestoning which could be consistent with laryngopharyngeal reflux, allergy, or other throat irritation.  It is possible that she could be having swallowing discomfort from her Chiari malformation.  Her CT imaging and endoscopic findings do not show any mass or lesion.  There is no concerns for her safety for discharge.  I did offer her a video swallow study with esophagram as an outpatient to better evaluate her swallow function.  She could consider having this done at [facility] since that is  "where she will be seeing neurology.  I offered her a trial of a proton pump inhibitor given the large incidence of laryngopharyngeal reflux in patients with a BMI of over 40 experiencing globus symptoms.  She declined any medical management for reflux because she feels like this is a \"physical problem\".  I will place an order for a video swallow study with esophagram and she can schedule this with [facility] if she would like and I can review the results when available.  Based on her physical exam and CT findings, I think she be safe to discharge home for outpatient follow-up.    Yon Mitchell MD  Department of Otolaryngology-Head and Neck Surgery  [facility]   "

## 2023-02-24 NOTE — DISCHARGE INSTRUCTIONS
You could try taking famotidine (Pepcid) daily for the next 2 weeks to see if this helps your symptoms.  Return to the emergency department for worsening breathing, inability to swallow, or other concerns.  Follow-up as an outpatient for the swallow study and in clinic for the next steps.

## 2023-02-24 NOTE — ED PROVIDER NOTES
"  History     Chief Complaint   Patient presents with     Neck Pain     Pain in throat-  if she relaxes her neck muscles it travels down he neck     HPI  Paula Back is a 32 year old female with a history of cerebellar tonsillar ectopy presenting for evaluation of neck and throat pain.  Symptoms began abruptly this evening around 11 PM.  Patient reports has been having some chronic neck pain issues for which she has had imaging including an MRI recently.  This pain has been chronically in her posterior neck.  Tonight she developed pain in the anterior neck most prominent on the right side of her neck next to her trachea.  No reported trauma to the area.  Patient reports pain with swallowing as well as with talking tonight since onset of pain.  Denies any known swelling in the area.  No recent food ingestion.  Patient reports it feels as though eating makes food get stuck now although she is able to tolerate fluids.  Denies fevers or chills.  Denies chest pain but does report \"if I relax my neck muscles the pain travels into my chest\".  Denies any difficulty breathing.  No nausea.  Denies previous similar symptoms in the past.      ==================================================================    CHART REVIEW:      MRI Cervical 1/9/23;    Impression    IMPRESSION:   1.  Unremarkable cervical cord.   2.  No high-grade spinal canal stenosis.   3.  At C4-C5, a left lateral/foraminal protrusion contributes to   moderate-to-severe left neural foraminal stenosis.   4.  At C5-C6, there is moderate left neural foraminal stenosis.   5.  Additional degenerative changes as above.   6.  Cerebellar tonsillar ectopia as described.      END CHART REVIEW  ==================================================================      Allergies:  No Known Allergies    Problem List:    Patient Active Problem List    Diagnosis Date Noted     Moderate major depression (H) 04/26/2013     Priority: Medium     Generalized anxiety " disorder 04/26/2013     Priority: Medium     Diagnosis updated by automated process. Provider to review and confirm.       Obese 02/06/2013     Priority: Medium     SOB (shortness of breath) 07/24/2012     Priority: Medium     24 hour contact handout given 07/24/2012     Priority: Medium     EMERGENCY CARE PLAN  Presenting Problem Signs and Symptoms Treatment Plan    Questions or conerns during clinic hours    I will call the clinic directly     Questions or conerns outside clinic hours    I will call the 24 hour nurse line at [phone]    Patient needs to schedule an appointment    I will call the 24 hour scheduling team at [phone] or clinic directly    Same day treatment     I will call the clinic first, nurse line if after hours, urgent care and express care if needed                                 CARDIOVASCULAR SCREENING; LDL GOAL LESS THAN 160 12/28/2011     Priority: Medium     Insomnia 05/17/2011     Priority: Medium     Advanced directives, counseling/discussion 03/25/2011     Priority: Medium     Patient does not have an Advance/Health Care Directive (HCD), declines information/referral.    Julia Meyer  March 25, 2011         Papanicolaou smear of cervix with low grade squamous intraepithelial lesion (LGSIL) 09/20/2009     Priority: Medium     9/16/09:Pap--LSIL. Per ASCCP guidelines, repeat pap 1 year.  11/2010:Per current guidelines, no further pap testing needed until age 21.  Being followed at [facility]  12/21/11:Pap--NIL  2/6/13:Pap--NIL  1/15/14: Pap - NIL. Repeat pap 3 yrs.        Family hx of alcoholism 04/06/2009     Priority: Medium     Both parents       Acne 11/12/2008     Priority: Medium     Contraceptive management 07/24/2006     Priority: Medium     Problem list name updated by automated process. Provider to review          Past Medical History:    No past medical history on file.    Past Surgical History:    No past surgical history on file.    Family History:    Family  "History   Problem Relation Age of Onset     Diabetes Maternal Grandfather      Cancer Maternal Grandmother         lung      Cancer Maternal Grandfather         father     Alcohol/Drug Father      Alcohol/Drug Mother        Social History:  Marital Status:   [2]  Social History     Tobacco Use     Smoking status: Former     Packs/day: 0.25     Types: Cigarettes     Smokeless tobacco: Never     Tobacco comments:     she is not exposed to tobacco at home   Substance Use Topics     Alcohol use: Yes     Comment: occas.  10 drinks through the week     Drug use: No        Medications:    albuterol (PROAIR HFA, PROVENTIL HFA, VENTOLIN HFA) 108 (90 BASE) MCG/ACT inhaler  citalopram (CELEXA) 20 MG tablet  Ibuprofen 200 MG capsule  MAGNESIUM PO  melatonin 5 MG tablet  Multiple Vitamin (MULTIVITAMINS PO)  predniSONE (DELTASONE) 20 MG tablet  Pseudoephedrine-DM-GG-APAP (DAYQUIL LIQUICAPS OR)  RA KRILL  MG CAPS  traMADol (ULTRAM) 50 MG tablet          Review of Systems   Constitutional: Negative for appetite change, chills, fatigue and fever.   HENT: Positive for sore throat, trouble swallowing and voice change (Slightly abnormal). Negative for congestion.    Respiratory: Negative for chest tightness.    Cardiovascular: Negative for chest pain.   Gastrointestinal: Negative for abdominal pain.   Genitourinary: Negative for decreased urine volume.   Musculoskeletal: Negative for back pain.   Neurological: Negative for light-headedness and headaches.   All other systems reviewed and are negative.      Physical Exam   BP: (!) 145/87  Pulse: 96  Temp: 98.6  F (37  C)  Resp: 16  Height: 157.5 cm (5' 2\")  Weight: 109.8 kg (242 lb)  SpO2: 97 %      Physical Exam  Vitals and nursing note reviewed.   Constitutional:       Appearance: Normal appearance. She is not ill-appearing or diaphoretic.   HENT:      Head: Atraumatic.      Nose: Nose normal.   Neck:      Trachea: Tracheal tenderness (Slight tenderness with movement) " present. No tracheal deviation.     Cardiovascular:      Rate and Rhythm: Normal rate.      Pulses: Normal pulses.   Pulmonary:      Effort: Pulmonary effort is normal.   Musculoskeletal:      Cervical back: Neck supple. Tenderness present. No rigidity. Pain with movement present.   Lymphadenopathy:      Cervical: No cervical adenopathy.   Neurological:      Mental Status: She is alert.         ED Course                 Procedures                  Results for orders placed or performed during the hospital encounter of 02/24/23 (from the past 24 hour(s))   CBC with platelets differential    Narrative    The following orders were created for panel order CBC with platelets differential.  Procedure                               Abnormality         Status                     ---------                               -----------         ------                     CBC with platelets and d...[[id number]]  Abnormal            Final result                 Please view results for these tests on the individual orders.   Basic metabolic panel   Result Value Ref Range    Sodium 137 136 - 145 mmol/L    Potassium 4.3 3.4 - 5.3 mmol/L    Chloride 103 98 - 107 mmol/L    Carbon Dioxide (CO2) 25 22 - 29 mmol/L    Anion Gap 9 7 - 15 mmol/L    Urea Nitrogen 5.3 (L) 6.0 - 20.0 mg/dL    Creatinine 0.71 0.51 - 0.95 mg/dL    Calcium 8.9 8.6 - 10.0 mg/dL    Glucose 94 70 - 99 mg/dL    GFR Estimate >90 >60 mL/min/1.73m2   CBC with platelets and differential   Result Value Ref Range    WBC Count 7.0 4.0 - 11.0 10e3/uL    RBC Count 4.04 3.80 - 5.20 10e6/uL    Hemoglobin 12.1 11.7 - 15.7 g/dL    Hematocrit 34.5 (L) 35.0 - 47.0 %    MCV 85 78 - 100 fL    MCH 30.0 26.5 - 33.0 pg    MCHC 35.1 31.5 - 36.5 g/dL    RDW 12.6 10.0 - 15.0 %    Platelet Count 339 150 - 450 10e3/uL    % Neutrophils 57 %    % Lymphocytes 33 %    % Monocytes 7 %    % Eosinophils 2 %    % Basophils 1 %    % Immature Granulocytes 0 %    NRBCs per 100 WBC 0 <1 /100    Absolute  Neutrophils 4.0 1.6 - 8.3 10e3/uL    Absolute Lymphocytes 2.4 0.8 - 5.3 10e3/uL    Absolute Monocytes 0.5 0.0 - 1.3 10e3/uL    Absolute Eosinophils 0.1 0.0 - 0.7 10e3/uL    Absolute Basophils 0.0 0.0 - 0.2 10e3/uL    Absolute Immature Granulocytes 0.0 <=0.4 10e3/uL    Absolute NRBCs 0.0 10e3/uL   Soft tissue neck CT w contrast    Narrative    EXAM: CT SOFT TISSUE NECK W CONTRAST  LOCATION: [facility]  DATE/TIME: 2/24/2023 2:47 AM    INDICATION: Severe neck pain around larynx, more on right of trachea.  COMPARISON: None.  CONTRAST: 90 mL Isovue 370  TECHNIQUE: Routine CT Soft Tissue Neck with IV contrast. Multiplanar reformats. Dose reduction techniques were used.    FINDINGS:     MUCOSAL SPACES/SOFT TISSUES: Normal mucosal spaces of the upper aerodigestive tract. Question mild effacement of the right piriform sinus with mild asymmetric thickening of the right aryepiglottic fold. Otherwise normal vocal cords and infraglottic   trachea. Normal parapharyngeal space and subcutaneous soft tissues. Normal oral cavity,  spaces, and floor of mouth structures.    LYMPH NODES: No pathologic lymph nodes by size or morphology criteria.     SALIVARY GLANDS: Normal parotid and submandibular glands.    THYROID: Normal.     VESSELS: Vascular structures of the neck are patent.    VISUALIZED INTRACRANIAL/ORBITS/SINUSES: No abnormality of the visualized intracranial compartment or orbits. Visualized paranasal sinuses and mastoid air cells are clear.    OTHER: No destructive osseous lesion. The included lung apices are clear.      Impression    IMPRESSION:   1.  Question mild asymmetric thickening of the right aryepiglottic fold with mild associated effacement of the right piriform sinus. This finding may be artifactual due to volume averaging and patient positioning within the scanner.   Infectious/inflammatory or even malignant processes could have a similar appearance. Consider direct inspection  "for further assessment.       Medications   ketorolac (TORADOL) injection 15 mg (15 mg Intravenous $Given 2/24/23 0226)   iopamidol (ISOVUE-370) solution 90 mL (90 mLs Intravenous $Given 2/24/23 0233)   sodium chloride 0.9 % bag 500mL for CT scan flush use (80 mLs Intravenous $Given 2/24/23 0233)   dexamethasone (DECADRON) injectable solution used ORALLY 10 mg (10 mg Oral $Given 2/24/23 0337)   LORazepam (ATIVAN) injection 1 mg (1 mg Intravenous $Given 2/24/23 4882)     3:22 AM Patient re-assessed: Pain in the back of her neck is slightly improved but she still reports minimal change with neck pain.  Advised her of the CT findings showing some mild changes of unclear significance.  Advised her of plan for ENT follow-up however patient states she does not feel she can swallow adequately to go home.  We will trial oral dexamethasone and allow that some time to take effect and then trial some applesauce to see if she can ingest that.  Patient is breathing without difficulty and is tolerating her secretions without difficulty.  At this time I have low suspicion for any significant dangerous acute pathology.    5:18 AM Patient re-assessed: Patient tearful and anxious.  Patient reports no change in her symptoms.  She still feels like something is \"poking into my esophagus\".      Patient Vitals for the past 24 hrs:   BP Temp Temp src Pulse Resp SpO2 Height Weight   02/24/23 0546 -- -- -- -- -- 94 % -- --   02/24/23 0545 -- -- -- -- -- 94 % -- --   02/24/23 0544 -- -- -- -- -- 93 % -- --   02/24/23 0543 -- -- -- -- -- 94 % -- --   02/24/23 0215 -- -- -- -- -- 100 % -- --   02/24/23 0210 -- -- -- -- -- 100 % -- --   02/24/23 0205 -- -- -- -- -- 100 % -- --   02/24/23 0200 -- -- -- -- -- 100 % -- --   02/24/23 0124 (!) 145/87 98.6  F (37  C) Oral 96 16 97 % 1.575 m (5' 2\") 109.8 kg (242 lb)     5:38 AM: Discussed with Dr Valdez ENT at the [facility].  She advised that these findings are most often artifactual and nonconcerning " and unlikely to represent the cause of her symptoms.  Also advises that we should have coverage locally. Will try again to reach our local covering doctor, Dr Mitchell.    5:49 AM; Discussed with Dr Mitchell, ENT.  Discussed case. Will come evaluate in ED.     5:59 AM: Patient was signed out at shift change to Dr Cheek       Assessments & Plan (with Medical Decision Making)  32-year-old female presenting for evaluation of neck and throat pain tonight.  Has had some chronic neck pain but this pain is new and different in the anterior neck on the right side.  Patient reports the pain began abruptly this evening.  She reports the pain is limiting her ability to swallow and she feels food is getting stuck.  No history of esophageal strictures.  Pain did not occur while eating or swallowing food.  Patient arrives reporting significant discomfort.  Tolerating secretions without difficulty.  Does have some anterior soreness on the right side of the trachea next to the thyroid cartilage.  Screening labs and CT obtained.  Labs showed no white count or left shift.  CT with contrast showed some questionable abnormality in this area of unclear significance.     I have reviewed the nursing notes.    I have reviewed the findings, diagnosis, plan and need for follow up with the patient.           New Prescriptions    No medications on file       Final diagnoses:   Neck pain   Pharyngitis, unspecified etiology       2/24/2023   [facility] EMERGENCY DEPT     West, Sachin Lowery MD  02/24/23 0601

## 2023-02-24 NOTE — ED PROVIDER NOTES
Emergency Department Patient Sign-out       Brief HPI:  This is a 32 year old female signed out to me by Dr. West .  See initial ED Provider note for details of the presentation.            Significant Events prior to my assuming care: 32-year-old female with recent diagnosis of cerebellar tonsillar ectopy that is being followed up by neurology as an outpatient who presented for difficulty swallowing and neck pain.    Observed overnight without improvement.  CT of the neck did not show significant change but did show some mild changes of one of her arytenoids.  Plan is to have ENT do a direct visualization.    Dr. Mitchell did perform direct visualization of her vocal cords here in the emergency department.  No acute abnormality that I could visualize.  He recommended discharging the patient with instructions to take medication for reflux and follow-up with outpatient swallow study either through [place] or through [facility] where she receives all of her care.  I discussed this with the patient, she is still very anxious about this, we discussed return precautions and she is told to come back if she is having trouble breathing or swallowing.  She is upset with this plan.      Exam:   Patient Vitals for the past 24 hrs:   BP Temp Temp src Pulse Resp SpO2 Height Weight   02/24/23 0903 -- -- -- -- -- 94 % -- --   02/24/23 0900 (!) 120/91 -- -- 107 -- 96 % -- --   02/24/23 0830 (!) 131/95 -- -- 113 -- 95 % -- --   02/24/23 0821 -- -- -- -- -- 96 % -- --   02/24/23 0817 134/88 -- -- -- -- 96 % -- --   02/24/23 0718 -- -- -- -- -- 93 % -- --   02/24/23 0717 -- -- -- -- -- 93 % -- --   02/24/23 0716 -- -- -- -- -- 93 % -- --   02/24/23 0714 -- -- -- -- -- 93 % -- --   02/24/23 0707 -- -- -- -- -- 93 % -- --   02/24/23 0706 -- -- -- -- -- 93 % -- --   02/24/23 0705 -- -- -- -- -- 93 % -- --   02/24/23 0703 -- -- -- -- -- 93 % -- --   02/24/23 0702 -- -- -- -- -- 93 % -- --   02/24/23 0654 -- -- -- -- -- 93 % -- --  "  02/24/23 0653 -- -- -- -- -- 93 % -- --   02/24/23 0651 -- -- -- -- -- 93 % -- --   02/24/23 0650 -- -- -- -- -- 93 % -- --   02/24/23 0546 -- -- -- -- -- 94 % -- --   02/24/23 0545 -- -- -- -- -- 94 % -- --   02/24/23 0544 -- -- -- -- -- 93 % -- --   02/24/23 0543 -- -- -- -- -- 94 % -- --   02/24/23 0215 -- -- -- -- -- 100 % -- --   02/24/23 0210 -- -- -- -- -- 100 % -- --   02/24/23 0205 -- -- -- -- -- 100 % -- --   02/24/23 0200 -- -- -- -- -- 100 % -- --   02/24/23 0124 (!) 145/87 98.6  F (37  C) Oral 96 16 97 % 1.575 m (5' 2\") 109.8 kg (242 lb)           ED RESULTS:   Results for orders placed or performed during the hospital encounter of 02/24/23 (from the past 24 hour(s))   CBC with platelets differential     Status: Abnormal    Collection Time: 02/24/23  2:21 AM    Narrative    The following orders were created for panel order CBC with platelets differential.  Procedure                               Abnormality         Status                     ---------                               -----------         ------                     CBC with platelets and d...[[id number]]  Abnormal            Final result                 Please view results for these tests on the individual orders.   Basic metabolic panel     Status: Abnormal    Collection Time: 02/24/23  2:21 AM   Result Value Ref Range    Sodium 137 136 - 145 mmol/L    Potassium 4.3 3.4 - 5.3 mmol/L    Chloride 103 98 - 107 mmol/L    Carbon Dioxide (CO2) 25 22 - 29 mmol/L    Anion Gap 9 7 - 15 mmol/L    Urea Nitrogen 5.3 (L) 6.0 - 20.0 mg/dL    Creatinine 0.71 0.51 - 0.95 mg/dL    Calcium 8.9 8.6 - 10.0 mg/dL    Glucose 94 70 - 99 mg/dL    GFR Estimate >90 >60 mL/min/1.73m2   CBC with platelets and differential     Status: Abnormal    Collection Time: 02/24/23  2:21 AM   Result Value Ref Range    WBC Count 7.0 4.0 - 11.0 10e3/uL    RBC Count 4.04 3.80 - 5.20 10e6/uL    Hemoglobin 12.1 11.7 - 15.7 g/dL    Hematocrit 34.5 (L) 35.0 - 47.0 %    MCV 85 78 - 100 " fL    MCH 30.0 26.5 - 33.0 pg    MCHC 35.1 31.5 - 36.5 g/dL    RDW 12.6 10.0 - 15.0 %    Platelet Count 339 150 - 450 10e3/uL    % Neutrophils 57 %    % Lymphocytes 33 %    % Monocytes 7 %    % Eosinophils 2 %    % Basophils 1 %    % Immature Granulocytes 0 %    NRBCs per 100 WBC 0 <1 /100    Absolute Neutrophils 4.0 1.6 - 8.3 10e3/uL    Absolute Lymphocytes 2.4 0.8 - 5.3 10e3/uL    Absolute Monocytes 0.5 0.0 - 1.3 10e3/uL    Absolute Eosinophils 0.1 0.0 - 0.7 10e3/uL    Absolute Basophils 0.0 0.0 - 0.2 10e3/uL    Absolute Immature Granulocytes 0.0 <=0.4 10e3/uL    Absolute NRBCs 0.0 10e3/uL   Soft tissue neck CT w contrast     Status: None    Collection Time: 02/24/23  2:47 AM    Narrative    EXAM: CT SOFT TISSUE NECK W CONTRAST  LOCATION: [facility]  DATE/TIME: 2/24/2023 2:47 AM    INDICATION: Severe neck pain around larynx, more on right of trachea.  COMPARISON: None.  CONTRAST: 90 mL Isovue 370  TECHNIQUE: Routine CT Soft Tissue Neck with IV contrast. Multiplanar reformats. Dose reduction techniques were used.    FINDINGS:     MUCOSAL SPACES/SOFT TISSUES: Normal mucosal spaces of the upper aerodigestive tract. Question mild effacement of the right piriform sinus with mild asymmetric thickening of the right aryepiglottic fold. Otherwise normal vocal cords and infraglottic   trachea. Normal parapharyngeal space and subcutaneous soft tissues. Normal oral cavity,  spaces, and floor of mouth structures.    LYMPH NODES: No pathologic lymph nodes by size or morphology criteria.     SALIVARY GLANDS: Normal parotid and submandibular glands.    THYROID: Normal.     VESSELS: Vascular structures of the neck are patent.    VISUALIZED INTRACRANIAL/ORBITS/SINUSES: No abnormality of the visualized intracranial compartment or orbits. Visualized paranasal sinuses and mastoid air cells are clear.    OTHER: No destructive osseous lesion. The included lung apices are clear.      Impression     IMPRESSION:   1.  Question mild asymmetric thickening of the right aryepiglottic fold with mild associated effacement of the right piriform sinus. This finding may be artifactual due to volume averaging and patient positioning within the scanner.   Infectious/inflammatory, traumatic or even malignant processes could have a similar appearance. Consider direct inspection for further assessment.  2.  Additional findings, as above.       ED MEDICATIONS:   Medications   ketorolac (TORADOL) injection 15 mg (15 mg Intravenous $Given 2/24/23 0226)   iopamidol (ISOVUE-370) solution 90 mL (90 mLs Intravenous $Given 2/24/23 0233)   sodium chloride 0.9 % bag 500mL for CT scan flush use (80 mLs Intravenous $Given 2/24/23 0233)   dexamethasone (DECADRON) injectable solution used ORALLY 10 mg (10 mg Oral $Given 2/24/23 0337)   LORazepam (ATIVAN) injection 1 mg (1 mg Intravenous $Given 2/24/23 2058)         Impression:    ICD-10-CM    1. Neck pain  M54.2 Adult ENT  Referral      2. Pharyngitis, unspecified etiology  J02.9             MD Ham Sanders Nicholas Hall, MD  02/24/23 1048

## 2023-02-24 NOTE — ED TRIAGE NOTES
"Patient presents for eval of neck/throat pain that travels down into the chest. Pt states that 2 weeks ago she had a \"rough\" adjustment done at the chiropractor. She has felt \"off\" since then. Today she was walking down the steps when she felt a \"shift\" in her throat and has been having pain ever since. If she relaxes her neck she has increased pain. Pt had a steroid injection at C5-6 in early Feb.      Triage Assessment     Row Name 02/24/23 0122       Triage Assessment (Adult)    Airway WDL WDL       Respiratory WDL    Respiratory WDL WDL       Skin Circulation/Temperature WDL    Skin Circulation/Temperature WDL WDL       Cognitive/Neuro/Behavioral WDL    Cognitive/Neuro/Behavioral WDL WDL              "

## 2023-02-24 NOTE — LETTER
February 24, 2023      To Whom It May Concern:      Paula Back was seen in our Emergency Department today, 02/24/23.     Sincerely,        Richard Cheek MD

## 2023-02-27 ENCOUNTER — TELEPHONE (OUTPATIENT)
Dept: OTOLARYNGOLOGY | Facility: OTHER | Age: 33
End: 2023-02-27
Payer: COMMERCIAL

## 2023-02-27 NOTE — TELEPHONE ENCOUNTER
Patient scheduled 3/23 with Dr. Cavazos, patient is happy with this plan, patient has no further questions or concerns.     Clarisa Box RN on 2/27/2023 at 2:49 PM

## 2023-02-27 NOTE — TELEPHONE ENCOUNTER
[facility] Call Center    Phone Message    May a detailed message be left on voicemail: yes     Reason for Call: Other: Pt concerned as she cannot see Dr until 6/7 - she states she cannot swallow food and feels pressure and pain in her chest and head from the neck problem. was referred from [place] clinic to see ENT.  Please call Pt to advise of options Thank You     Action Taken: Other: ENT    Travel Screening: Not Applicable

## 2023-03-15 ENCOUNTER — TRANSFERRED RECORDS (OUTPATIENT)
Dept: HEALTH INFORMATION MANAGEMENT | Facility: CLINIC | Age: 33
End: 2023-03-15

## 2023-03-17 ENCOUNTER — HOSPITAL ENCOUNTER (OUTPATIENT)
Dept: SPEECH THERAPY | Facility: CLINIC | Age: 33
Discharge: HOME | End: 2023-03-17
Attending: OTOLARYNGOLOGY
Payer: COMMERCIAL

## 2023-03-17 ENCOUNTER — HOSPITAL ENCOUNTER (OUTPATIENT)
Dept: GENERAL RADIOLOGY | Facility: CLINIC | Age: 33
Discharge: HOME | End: 2023-03-17
Attending: OTOLARYNGOLOGY
Payer: COMMERCIAL

## 2023-03-17 DIAGNOSIS — R09.A2 GLOBUS SENSATION: ICD-10-CM

## 2023-03-17 DIAGNOSIS — R13.10 DYSPHAGIA, UNSPECIFIED TYPE: ICD-10-CM

## 2023-03-17 PROCEDURE — 92610 EVALUATE SWALLOWING FUNCTION: CPT | Mod: GN | Performed by: SPEECH-LANGUAGE PATHOLOGIST

## 2023-03-17 PROCEDURE — 74230 X-RAY XM SWLNG FUNCJ C+: CPT

## 2023-03-17 PROCEDURE — 92611 MOTION FLUOROSCOPY/SWALLOW: CPT | Mod: GN | Performed by: SPEECH-LANGUAGE PATHOLOGIST

## 2023-03-17 PROCEDURE — 74221 X-RAY XM ESOPHAGUS 2CNTRST: CPT

## 2023-03-17 PROCEDURE — 255N000001 HC RX 255: Performed by: RADIOLOGY

## 2023-03-17 RX ADMIN — ANTACID/ANTIFLATULENT 4 PACKET: 380; 550; 10; 10 GRANULE, EFFERVESCENT ORAL at 08:45

## 2023-03-17 NOTE — PROGRESS NOTES
"   03/17/23 0900       Present No   General Information   Type Of Visit Initial   Start Of Care Date 03/17/23   Referring Physician Dr. Yon Mitchell   Orders Evaluate And Treat   Medical Diagnosis dysphagia   Onset Of Illness/injury Or Date Of Surgery 02/24/23   Precautions/limitations No Known Precautions/limitations   Hearing WFLs   Pertinent History of Current Problem/OT: Additional Occupational Profile Info Patient is a 32 year old woman who was referred by her physician due to ongoing globus sensation when swallowing food and liquids. 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 Diagnosis of cerebellar tonsillar ectopy and change and adenoids. Patient reports that just prior to her swallowing difficulties, she had a  rough adjustment with the chiropractor and after that my throat began clicking and it feels like things snapped to the right  .   Patient reports that her throat was scoped following the onset of her swallowing problems and nothing was identified to be of significant concern and only mild swelling of the fax was noted. Patient reports she was put on Prednisone and this has resolved most of her swallowing issues and she's here today to more fully assess the functional for swallowing mechanism and identify any mechanical abnormalities or changes that may be contributing to her previous swallowing difficulties   Respiratory Status Room air   Prior Level Of Function Swallowing   Prior Level Of Function Comment   (no restrictions)   Patient Role/employment History Employed   Living Environment [place]   General Observations relaxed and participated well with the evaluation   Patient/family Goals \"I want to know if something is broken.\"   Pain Assessment   Pain Reported No   Fall Risk Screen   Fall screen completed by SLP   Have you fallen 2 or more times in the past year? No   Have you fallen and had an injury in the past year? " No   Is patient a fall risk? No   Abuse Screen (yes response referral indicated)   Feels Unsafe at Home or Work/School no   Feels Threatened by Someone no   Does Anyone Try to Keep You From Having Contact with Others or Doing Things Outside Your Home? no   Physical Signs of Abuse Present no   Clinical Swallow Evaluation   Oral Musculature generally intact   Structural Abnormalities none present   Dentition present and adequate   Mucosal Quality dry   Mandibular Strength and Mobility intact   Oral Labial Strength and Mobility WFL   Lingual Strength and Mobility WFL   Velar Elevation intact   Buccal Strength and Mobility intact   Laryngeal Function Cough;Throat clear;Swallow;Voicing initiated;Dry swallow palpated   Oral Musculature Comments OME completed and patient presents with masticators within normal range, rate, strength, and coordination for swallowing. Swallow initiated in a timely manner.  Voice is clear and strong and cough and throat clear are productive. No concerns at this time.   Additional evaluation(s) completed today Yes;Recommended   Rationale for completing additional evaluation to more fully assess the functional for swallowing mechanism and identify any mechanical abnormalities or changes that may be contributing to her previous swallowing difficulties   VFSS Evaluation   VFSS Additional Documentation Yes   VFSS Eval: Thin Liquid Texture Trial   Mode of Presentation, Thin Liquid cup;straw;fed by clinician   Order of Presentation 1, 2, 3, 4, 5, 12, 13   Preparatory Phase WFL   Oral Phase, Thin Liquid WFL   Pharyngeal Phase, Thin Liquid WFL   Rosenbek's Penetration Aspiration Scale: Thin Liquid Trial Results 1 - no aspiration, contrast does not enter airway   Diagnostic Statement no significant concerns; when multiple sips of thin liquid taken, very small amount of bolus is present on posterior portion of epiglottis during the swallow, however, no aspiration or penetration noted.  No difficulties  with barium tablet.  AP view equal.   VFSS Eval: Puree Solid Texture Trial   Mode of Presentation, Puree spoon;self-fed   Order of Presentation 6, 7   Preparatory Phase WFL   Oral Phase, Puree WFL   Pharyngeal Phase, Puree WFL   Rosenbek's Penetration Aspiration Scale: Puree Food Trial Results 1 - no aspiration, contrast does not enter airway   Diagnostic Statement no concerns   VFSS Eval: Soft & Bite Sized   Mode of Presentation spoon;fed by clinician   Order of Presentation 8, 9   Preparatory Phase WFL   Oral Phase WFL   Pharyngeal Phase WFL   Rosenbek's Penetration Aspiration Scale 1 - no aspiration, contrast does not enter airway   Diagnostic Statement no concerns   VFSS Eval: Regular Texture Trial (Solid)   Mode of Presentation spoon;self-fed   Order of Presentation 10, 11   Preparatory Phase WFL   Oral Phase WFL   Pharyngeal Phase WFL   Rosenbek's Penetration Aspiration Scale 1 - no aspiration, contrast does not enter airway   Diagnostic Statement no concerns   Swallow Compensations   Swallow Compensations No compensations were used   Results No difficulties noted   Educational Assessment   Barriers to Learning No barriers   Preferred Learning Style Listening   Esophageal Phase of Swallow   Patient reports or presents with symptoms of esophageal dysphagia Other (Comments)  (esophagram completed immediately following this VFSS)   Esophageal sweep performed during today s vidofluoroscopic exam  Please refer to radiologist's report for details   Swallow Eval: Clinical Impressions   Skilled Criteria for Therapy Intervention No problems identified which require skilled intervention   Functional Assessment Scale (FAS) 7   Dysphagia Outcome Severity Scale (ALAN) Level 7 - ALAN   Diet texture recommendations Regular diet;Thin liquids (level 0)   Patient, family and/or staff in agreement with Plan of Care Yes   Clinical Impression Comments Completed VFSS with patient in upright position and lateral views and AP  completed. Patient fed herself with cup, spoon, and straw and followed directions.  Patient followed directions without any difficulties.   Trials included thin liquids, pureed, soft, and regular food trials and a 1.3 cm barium tablet.  Swallow is within functional limits and no penetration or aspiration was noted.  Overall, very minimal swallowing difficulties were noted in the oral, transit, and pharyngeal stages of the swallow.  Throughout all trials, patient consistently retained less than 10% of bolus, liquid or solid, within oral cavity and independently and immediately completed second swallow.  This did not vary with larger vs smaller amounts.  Large pureed food trial resulted in patient effectively managing bolus into 2 separate swallows without difficulty.  Swallow was initiated in timely manner with no evidence of penetration or aspiration.  Bolus was effectively masticated and held in center of oral cavity until swallow was initiated and then swallow was triggered in timely manner with pharyngeal area clearing well following all trials.  Across all consistencies, there was very minimal residual noted and this is to be considered WFLs.  In AP view with 2 thin liquid trials, it was noted that there was premature spillage over base of tongue to level of posterior portion of the vallaculae before swallow was initiated and this spillage was noted to spill bilaterally.  No difficulties swallowing barium tablet with consecutive sips of water.  No cricopharyngeal bar noted.  Esophagram immediately following VFSS.    Recommend a diet consistency of regular foods and thin liquids.  No skilled intervention for swallowing difficulties is warranted at this time.   Thank you for this appropriate referral   Total Session Time   SLP Eval: oral/pharyngeal swallow function, clinical minutes (65727) 18   SLP Eval: VideoFluoroscopic Swallow function Minutes (38401) 17   Total Evaluation Time 35       Thank you for this  referral.  Enma Hartman MA (Mattie), CCC-SLP    [facility]  [email or web address]  Direct Line: [phone]

## 2023-03-19 ENCOUNTER — TRANSFERRED RECORDS (OUTPATIENT)
Dept: HEALTH INFORMATION MANAGEMENT | Facility: CLINIC | Age: 33
End: 2023-03-19

## 2023-03-23 ENCOUNTER — OFFICE VISIT (OUTPATIENT)
Dept: OTOLARYNGOLOGY | Facility: CLINIC | Age: 33
End: 2023-03-23
Payer: COMMERCIAL

## 2023-03-23 VITALS
DIASTOLIC BLOOD PRESSURE: 62 MMHG | HEART RATE: 103 BPM | OXYGEN SATURATION: 98 % | SYSTOLIC BLOOD PRESSURE: 99 MMHG | WEIGHT: 238 LBS | BODY MASS INDEX: 43.53 KG/M2

## 2023-03-23 DIAGNOSIS — R13.13 PHARYNGEAL DYSPHAGIA: ICD-10-CM

## 2023-03-23 DIAGNOSIS — R09.A2 GLOBUS SENSATION: Primary | ICD-10-CM

## 2023-03-23 DIAGNOSIS — M54.2 CERVICALGIA: ICD-10-CM

## 2023-03-23 DIAGNOSIS — E66.01 MORBID OBESITY (H): ICD-10-CM

## 2023-03-23 PROCEDURE — 31575 DIAGNOSTIC LARYNGOSCOPY: CPT | Performed by: OTOLARYNGOLOGY

## 2023-03-23 PROCEDURE — 99203 OFFICE O/P NEW LOW 30 MIN: CPT | Mod: 25 | Performed by: OTOLARYNGOLOGY

## 2023-03-23 RX ORDER — ONDANSETRON 4 MG/1
4 TABLET, FILM COATED ORAL
COMMUNITY
Start: 2022-04-02 | End: 2024-04-01

## 2023-03-23 RX ORDER — PROCHLORPERAZINE MALEATE 5 MG
5 TABLET ORAL
COMMUNITY
Start: 2023-02-14 | End: 2024-04-01

## 2023-03-23 RX ORDER — DEXTROAMPHETAMINE SACCHARATE, AMPHETAMINE ASPARTATE MONOHYDRATE, DEXTROAMPHETAMINE SULFATE AND AMPHETAMINE SULFATE 3.75; 3.75; 3.75; 3.75 MG/1; MG/1; MG/1; MG/1
15 CAPSULE, EXTENDED RELEASE ORAL
COMMUNITY
Start: 2022-04-27 | End: 2023-06-02

## 2023-03-23 RX ORDER — DEXTROAMPHETAMINE SACCHARATE, AMPHETAMINE ASPARTATE MONOHYDRATE, DEXTROAMPHETAMINE SULFATE AND AMPHETAMINE SULFATE 1.25; 1.25; 1.25; 1.25 MG/1; MG/1; MG/1; MG/1
5 CAPSULE, EXTENDED RELEASE ORAL EVERY MORNING
Status: ON HOLD | COMMUNITY
End: 2024-08-15

## 2023-03-23 RX ORDER — SEMAGLUTIDE 2.4 MG/.75ML
2.4 INJECTION, SOLUTION SUBCUTANEOUS
COMMUNITY
Start: 2023-02-14 | End: 2024-04-01

## 2023-03-23 RX ORDER — CYCLOBENZAPRINE HCL 5 MG
5-10 TABLET ORAL
COMMUNITY
Start: 2022-12-27 | End: 2023-06-02

## 2023-03-23 RX ORDER — IBUPROFEN 200 MG
400 TABLET ORAL EVERY 8 HOURS PRN
Status: ON HOLD | COMMUNITY
End: 2024-08-17

## 2023-03-23 RX ORDER — ACETAMINOPHEN 325 MG/1
650 TABLET ORAL EVERY 4 HOURS PRN
COMMUNITY
Start: 2022-10-06

## 2023-03-23 RX ORDER — CLINDAMYCIN PHOSPHATE 10 MG/G
GEL TOPICAL 2 TIMES DAILY PRN
COMMUNITY
Start: 2021-10-17

## 2023-03-23 RX ORDER — DULOXETIN HYDROCHLORIDE 30 MG/1
1 CAPSULE, DELAYED RELEASE ORAL EVERY EVENING
COMMUNITY
Start: 2023-01-13

## 2023-03-23 NOTE — LETTER
"    3/23/2023         RE: Paula Back  [address] [facility] [address]        Dear Colleague,    Thank you for referring your patient, Paula Back, to the [facility]. Please see a copy of my visit note below.    ENT Consultation    Paula Back is a 32 year old female who is seen in consultation at the request of self.      History of Present Illness - Paula Back is a 32 year old female presents for evaluation of foreign body sensation and discomfort in the right side of neck.  The problem started about a month ago when she went to chiropractic due to some of the other musculoskeletal leg issues and had a \"rough\" adjustment.  Immediately felt something \"poking\" in the right side of the neck sharp and dull pain something felt like it \"snapped\".  She had trouble swallowing solids for few days.  Finally was seen in the emergency department.  Had a fiberoptic laryngoscopy that showed some signs of laryngopharyngeal reflux disease.  Also had a CT scan of the neck at that time.  Has Chiari type I malformation that is being watched carefully.  VIDEO SWALLOW WITH SLP OR OT, ESOPHAGRAM DOUBLE CONTRAST   3/17/2023  9:33 AM      HISTORY: Dysphagia, unspecified type; Globus sensation; BMI 40.0-44.9,  adult (H).     COMPARISON: CT neck dated 2/24/2023.     TECHNIQUE: Fluoroscopic assistance was provided to speech pathology  for evaluation of the patient swallowing mechanism. Note that only the  cervical esophagus was evaluated for the swallow study. A total of 1.1  (accession [id number]), .8 (accession [id number]) minutes of fluoroscopy  time was utilized for the exam. Successfully single and double  contrast esophagram was performed in usual fashion.     Multiple consistencies of barium were ingested. These included thin,  pureed, soft, regular and tablet consistencies.      FINDINGS: There is a normal swallowing mechanism on all consistencies  tested with swallowing " study.     Subsequently a double and single contrast esophagrams were performed  and demonstrate no intrinsic or extrinsic filling defects in the  esophagus. The double contrast portion is somewhat limited. There is  mild dysmotility with minimal escape from the primary stripping wave  on the initial swallow which was cleared by secondary stripping waves.  Otherwise motility was within normal limits and on the additional  swallows, the primary stripping wave completely cleared the esophagus.  There is gastroesophageal reflux to the level of the clavicles which  happened after provocative maneuvers.     Please see further details in report by speech pathology.     This study only includes the cervical esophagus.     FLUOROSCOPY TIME: 1.1 (accession [id number]), .8 (accession [id number])  minutes.  SPOT IMAGES: 4.  VIDEO RUNS: 13.  LAST IMAGE FLUORO HOLD IMAGES: 8.                                                                      IMPRESSION:  1. Normal swallow study.  2. Minimal dysmotility of the esophagus with mild escape from the  primary stripping wave which is cleared by secondary stripping waves  on one of the swallows. The other swallows demonstrated no escape from  the primary stripping wave.  3. Gastroesophageal reflux to the level of the clavicles was elicited  with provocative maneuvers.  4. No other abnormalities are identified. I discussed these findings  with the patient at time of exam.  5. Please see also speech pathology report for additional information  on the swallow portion of the study.      EXAM: CT SOFT TISSUE NECK W CONTRAST  LOCATION: [facility]  DATE/TIME: 2/24/2023 2:47 AM     INDICATION: Severe neck pain around larynx, more on right of trachea.  COMPARISON: None.  CONTRAST: 90 mL Isovue 370  TECHNIQUE: Routine CT Soft Tissue Neck with IV contrast. Multiplanar reformats. Dose reduction techniques were used.     FINDINGS:      MUCOSAL SPACES/SOFT TISSUES: Normal  mucosal spaces of the upper aerodigestive tract. Question mild effacement of the right piriform sinus with mild asymmetric thickening of the right aryepiglottic fold. Otherwise normal vocal cords and infraglottic   trachea. Normal parapharyngeal space and subcutaneous soft tissues. Normal oral cavity,  spaces, and floor of mouth structures.     LYMPH NODES: No pathologic lymph nodes by size or morphology criteria.      SALIVARY GLANDS: Normal parotid and submandibular glands.     THYROID: Normal.      VESSELS: Vascular structures of the neck are patent.     VISUALIZED INTRACRANIAL/ORBITS/SINUSES: No abnormality of the visualized intracranial compartment or orbits. Visualized paranasal sinuses and mastoid air cells are clear.     OTHER: No destructive osseous lesion. The included lung apices are clear.                                                                      IMPRESSION:   1.  Question mild asymmetric thickening of the right aryepiglottic fold with mild associated effacement of the right piriform sinus. This finding may be artifactual due to volume averaging and patient positioning within the scanner.   Infectious/inflammatory, traumatic or even malignant processes could have a similar appearance. Consider direct inspection for further assessment.  2.  Additional findings, as above.    At this point her dysphagia premature resolved but is when she is well still feels something on the right side of the throat.  She is concerned about the findings of edema of the aryepiglottic fold on the right and piriform sinus.  Past Medical History - No past medical history on file.    Current Medications -   Current Outpatient Medications:      albuterol (PROAIR HFA, PROVENTIL HFA, VENTOLIN HFA) 108 (90 BASE) MCG/ACT inhaler, Inhale 2 puffs into the lungs every 6 hours as needed for shortness of breath / dyspnea or wheezing, Disp: 1 Inhaler, Rfl: 0     citalopram (CELEXA) 20 MG tablet, Take 1.5 tablets (30 mg)  by mouth daily DUE for office appt with Reji before any further refills, Disp: 45 tablet, Rfl: 0     Ibuprofen 200 MG capsule, Take 400 mg by mouth every 4 hours as needed for fever., Disp: 100 capsule, Rfl: 0     MAGNESIUM PO, Take by mouth At Bedtime, Disp: , Rfl:      melatonin 5 MG tablet, Take 5 mg by mouth At Bedtime, Disp: , Rfl:      Multiple Vitamin (MULTIVITAMINS PO), Take by mouth daily 1/2 tab, Disp: , Rfl:      predniSONE (DELTASONE) 20 MG tablet, Take 3 tablets (60 mg) by mouth daily, Disp: 15 tablet, Rfl: 0     Pseudoephedrine-DM-GG-APAP (DAYQUIL LIQUICAPS OR), , Disp: , Rfl:      RA KRILL  MG CAPS, , Disp: , Rfl:      traMADol (ULTRAM) 50 MG tablet, Take 1 tablet (50 mg) by mouth every 6 hours as needed for severe pain, Disp: 15 tablet, Rfl: 0    Allergies - No Known Allergies    Social History -   Social History     Socioeconomic History     Marital status:    Tobacco Use     Smoking status: Former     Packs/day: 0.25     Types: Cigarettes     Smokeless tobacco: Never     Tobacco comments:     she is not exposed to tobacco at home   Substance and Sexual Activity     Alcohol use: Yes     Comment: occas.  10 drinks through the week     Drug use: No     Sexual activity: Yes     Partners: Male     Birth control/protection: Condom, Injection   Other Topics Concern     Parent/sibling w/ CABG, MI or angioplasty before 65F 55M? No   Social History Narrative    Works at [employer] on 4:30-1 AM shift       Family History -   Family History   Problem Relation Age of Onset     Diabetes Maternal Grandfather      Cancer Maternal Grandmother         lung      Cancer Maternal Grandfather         father     Alcohol/Drug Father      Alcohol/Drug Mother        Review of Systems - As per HPI and PMHx, otherwise review of system review of the head and neck negative. Otherwise 10+ review systems negative.    Physical Exam  LMP 02/03/2023 (Approximate)   BMI: There is no height or weight on file to  calculate BMI.    General - The patient is well nourished and well developed, and appears to have good nutritional status.  Alert and oriented to person and place, answers questions and cooperates with examination appropriately.    SKIN - No suspicious lesions or rashes.  Respiration - No respiratory distress.  Head and Face - Normocephalic and atraumatic, with no gross asymmetry noted of the contour of the facial features.  The facial nerve is intact, with strong symmetric movements.    Voice and Breathing - The patient was breathing comfortably without the use of accessory muscles. The patients voice was clear and strong, and had appropriate pitch and quality.    Ears - Bilateral pinna and EACs with normal appearing overlying skin. Tympanic membrane intact with good mobility on pneumatic otoscopy bilaterally. Bony landmarks of the ossicular chain are normal. The tympanic membranes are normal in appearance. No retraction, perforation, or masses.  No fluid or purulence was seen in the external canal or the middle ear.     Eyes - Extraocular movements intact.  Sclera were not icteric or injected, conjunctiva were pink and moist.    Mouth - Examination of the oral cavity showed pink, healthy oral mucosa. No lesions or ulcerations noted.  The tongue was mobile and midline, and the dentition were in good condition.      Throat - The walls of the oropharynx were smooth, pink, moist, symmetric, and had no lesions or ulcerations.  The tonsillar pillars and soft palate were symmetric.  The uvula was midline on elevation.    Neck - Normal midline excursion of the laryngotracheal complex during swallowing.  Full range of motion on passive movement.  Palpation of the occipital, submental, submandibular, internal jugular chain, and supraclavicular nodes did not demonstrate any abnormal lymph nodes or masses.  The carotid pulse was palpable bilaterally.  Palpation of the thyroid was soft and smooth, with no nodules or goiter  appreciated.  The trachea was mobile and midline.    Nose - External contour is symmetric, no gross deflection or scars.  Nasal mucosa is pink and moist with no abnormal mucus.  The septum was midline and non-obstructive, turbinates of normal size and position.  No polyps, masses, or purulence noted on examination.    Neuro - Nonfocal neuro exam is normal, CN 2 through 12 intact, normal gait and muscle tone.      Performed in clinic today:  Attempts at mirror laryngoscopy were not possible due to gag reflex.  Therefore I proceeded with a fiberoptic examination.  First I sprayed both sides of the nose with a mixture of lidocaine and neosynephrine.  I then passed the scope through the nasal cavity.  The nasal cavity was unremarkable.  The nasopharynx was mucosally covered and symmetric.  The Eustachian tube openings were unobstructed.  Going further down I had a clear view of the base of tongue which had normal appearing lingual tonsillar tissue.  The base of tongue was free of lesions, and the vallecula was open.  The epiglottis was smooth and mucosally covered.  The supraglottic larynx was then clearly visualized.  The vocal cords moved smoothly and symmetrically, they were pearly white and no lesions were seen.  The pyriform sinuses were open, and the limited view of the postcricoid region did not show any lesions.  No edema noted on the aryepiglottic fold or piriform sinus area.  Blue Flexible scope - Olympus - [id number]      A/P - Paula Back is a 32 year old female with possibly musculoskeletal trauma as a result of chiropractic adjustment.  Currently symptoms are improving with mild residual symptomatology.  Reassured the patient.  As far as reflux is concerned certainly talk about more dietary restrictions as far as avoidance of late-night eating especially fatty greasy foods avoidance of caffeinated and carbonated products in the evenings.  She will try to do so.  Local heat as needed with ibuprofen  will be used.  If patient continues to have symptoms after the next couple months she will come back.      Jason Cavazos MD      Again, thank you for allowing me to participate in the care of your patient.        Sincerely,        Jason Cavazos MD, MD

## 2023-03-23 NOTE — PROGRESS NOTES
"ENT Consultation    Paula Back is a 32 year old female who is seen in consultation at the request of self.      History of Present Illness - Paula Back is a 32 year old female presents for evaluation of foreign body sensation and discomfort in the right side of neck.  The problem started about a month ago when she went to chiropractic due to some of the other musculoskeletal leg issues and had a \"rough\" adjustment.  Immediately felt something \"poking\" in the right side of the neck sharp and dull pain something felt like it \"snapped\".  She had trouble swallowing solids for few days.  Finally was seen in the emergency department.  Had a fiberoptic laryngoscopy that showed some signs of laryngopharyngeal reflux disease.  Also had a CT scan of the neck at that time.  Has Chiari type I malformation that is being watched carefully.  VIDEO SWALLOW WITH SLP OR OT, ESOPHAGRAM DOUBLE CONTRAST   3/17/2023  9:33 AM      HISTORY: Dysphagia, unspecified type; Globus sensation; BMI 40.0-44.9,  adult (H).     COMPARISON: CT neck dated 2/24/2023.     TECHNIQUE: Fluoroscopic assistance was provided to speech pathology  for evaluation of the patient swallowing mechanism. Note that only the  cervical esophagus was evaluated for the swallow study. A total of 1.1  (accession [id number]), .8 (accession [id number]) minutes of fluoroscopy  time was utilized for the exam. Successfully single and double  contrast esophagram was performed in usual fashion.     Multiple consistencies of barium were ingested. These included thin,  pureed, soft, regular and tablet consistencies.      FINDINGS: There is a normal swallowing mechanism on all consistencies  tested with swallowing study.     Subsequently a double and single contrast esophagrams were performed  and demonstrate no intrinsic or extrinsic filling defects in the  esophagus. The double contrast portion is somewhat limited. There is  mild dysmotility with minimal escape from the " primary stripping wave  on the initial swallow which was cleared by secondary stripping waves.  Otherwise motility was within normal limits and on the additional  swallows, the primary stripping wave completely cleared the esophagus.  There is gastroesophageal reflux to the level of the clavicles which  happened after provocative maneuvers.     Please see further details in report by speech pathology.     This study only includes the cervical esophagus.     FLUOROSCOPY TIME: 1.1 (accession [id number]), .8 (accession [id number])  minutes.  SPOT IMAGES: 4.  VIDEO RUNS: 13.  LAST IMAGE FLUORO HOLD IMAGES: 8.                                                                      IMPRESSION:  1. Normal swallow study.  2. Minimal dysmotility of the esophagus with mild escape from the  primary stripping wave which is cleared by secondary stripping waves  on one of the swallows. The other swallows demonstrated no escape from  the primary stripping wave.  3. Gastroesophageal reflux to the level of the clavicles was elicited  with provocative maneuvers.  4. No other abnormalities are identified. I discussed these findings  with the patient at time of exam.  5. Please see also speech pathology report for additional information  on the swallow portion of the study.      EXAM: CT SOFT TISSUE NECK W CONTRAST  LOCATION: [facility]  DATE/TIME: 2/24/2023 2:47 AM     INDICATION: Severe neck pain around larynx, more on right of trachea.  COMPARISON: None.  CONTRAST: 90 mL Isovue 370  TECHNIQUE: Routine CT Soft Tissue Neck with IV contrast. Multiplanar reformats. Dose reduction techniques were used.     FINDINGS:      MUCOSAL SPACES/SOFT TISSUES: Normal mucosal spaces of the upper aerodigestive tract. Question mild effacement of the right piriform sinus with mild asymmetric thickening of the right aryepiglottic fold. Otherwise normal vocal cords and infraglottic   trachea. Normal parapharyngeal space and  subcutaneous soft tissues. Normal oral cavity,  spaces, and floor of mouth structures.     LYMPH NODES: No pathologic lymph nodes by size or morphology criteria.      SALIVARY GLANDS: Normal parotid and submandibular glands.     THYROID: Normal.      VESSELS: Vascular structures of the neck are patent.     VISUALIZED INTRACRANIAL/ORBITS/SINUSES: No abnormality of the visualized intracranial compartment or orbits. Visualized paranasal sinuses and mastoid air cells are clear.     OTHER: No destructive osseous lesion. The included lung apices are clear.                                                                      IMPRESSION:   1.  Question mild asymmetric thickening of the right aryepiglottic fold with mild associated effacement of the right piriform sinus. This finding may be artifactual due to volume averaging and patient positioning within the scanner.   Infectious/inflammatory, traumatic or even malignant processes could have a similar appearance. Consider direct inspection for further assessment.  2.  Additional findings, as above.    At this point her dysphagia premature resolved but is when she is well still feels something on the right side of the throat.  She is concerned about the findings of edema of the aryepiglottic fold on the right and piriform sinus.  Past Medical History - No past medical history on file.    Current Medications -   Current Outpatient Medications:      albuterol (PROAIR HFA, PROVENTIL HFA, VENTOLIN HFA) 108 (90 BASE) MCG/ACT inhaler, Inhale 2 puffs into the lungs every 6 hours as needed for shortness of breath / dyspnea or wheezing, Disp: 1 Inhaler, Rfl: 0     citalopram (CELEXA) 20 MG tablet, Take 1.5 tablets (30 mg) by mouth daily DUE for office appt with Reji before any further refills, Disp: 45 tablet, Rfl: 0     Ibuprofen 200 MG capsule, Take 400 mg by mouth every 4 hours as needed for fever., Disp: 100 capsule, Rfl: 0     MAGNESIUM PO, Take by mouth At Bedtime,  Disp: , Rfl:      melatonin 5 MG tablet, Take 5 mg by mouth At Bedtime, Disp: , Rfl:      Multiple Vitamin (MULTIVITAMINS PO), Take by mouth daily 1/2 tab, Disp: , Rfl:      predniSONE (DELTASONE) 20 MG tablet, Take 3 tablets (60 mg) by mouth daily, Disp: 15 tablet, Rfl: 0     Pseudoephedrine-DM-GG-APAP (DAYQUIL LIQUICAPS OR), , Disp: , Rfl:      RA KRILL  MG CAPS, , Disp: , Rfl:      traMADol (ULTRAM) 50 MG tablet, Take 1 tablet (50 mg) by mouth every 6 hours as needed for severe pain, Disp: 15 tablet, Rfl: 0    Allergies - No Known Allergies    Social History -   Social History     Socioeconomic History     Marital status:    Tobacco Use     Smoking status: Former     Packs/day: 0.25     Types: Cigarettes     Smokeless tobacco: Never     Tobacco comments:     she is not exposed to tobacco at home   Substance and Sexual Activity     Alcohol use: Yes     Comment: occas.  10 drinks through the week     Drug use: No     Sexual activity: Yes     Partners: Male     Birth control/protection: Condom, Injection   Other Topics Concern     Parent/sibling w/ CABG, MI or angioplasty before 65F 55M? No   Social History Narrative    Works at [employer] on 4:30-1 AM shift       Family History -   Family History   Problem Relation Age of Onset     Diabetes Maternal Grandfather      Cancer Maternal Grandmother         lung      Cancer Maternal Grandfather         father     Alcohol/Drug Father      Alcohol/Drug Mother        Review of Systems - As per HPI and PMHx, otherwise review of system review of the head and neck negative. Otherwise 10+ review systems negative.    Physical Exam  LMP 02/03/2023 (Approximate)   BMI: There is no height or weight on file to calculate BMI.    General - The patient is well nourished and well developed, and appears to have good nutritional status.  Alert and oriented to person and place, answers questions and cooperates with examination appropriately.    SKIN - No suspicious  lesions or rashes.  Respiration - No respiratory distress.  Head and Face - Normocephalic and atraumatic, with no gross asymmetry noted of the contour of the facial features.  The facial nerve is intact, with strong symmetric movements.    Voice and Breathing - The patient was breathing comfortably without the use of accessory muscles. The patients voice was clear and strong, and had appropriate pitch and quality.    Ears - Bilateral pinna and EACs with normal appearing overlying skin. Tympanic membrane intact with good mobility on pneumatic otoscopy bilaterally. Bony landmarks of the ossicular chain are normal. The tympanic membranes are normal in appearance. No retraction, perforation, or masses.  No fluid or purulence was seen in the external canal or the middle ear.     Eyes - Extraocular movements intact.  Sclera were not icteric or injected, conjunctiva were pink and moist.    Mouth - Examination of the oral cavity showed pink, healthy oral mucosa. No lesions or ulcerations noted.  The tongue was mobile and midline, and the dentition were in good condition.      Throat - The walls of the oropharynx were smooth, pink, moist, symmetric, and had no lesions or ulcerations.  The tonsillar pillars and soft palate were symmetric.  The uvula was midline on elevation.    Neck - Normal midline excursion of the laryngotracheal complex during swallowing.  Full range of motion on passive movement.  Palpation of the occipital, submental, submandibular, internal jugular chain, and supraclavicular nodes did not demonstrate any abnormal lymph nodes or masses.  The carotid pulse was palpable bilaterally.  Palpation of the thyroid was soft and smooth, with no nodules or goiter appreciated.  The trachea was mobile and midline.    Nose - External contour is symmetric, no gross deflection or scars.  Nasal mucosa is pink and moist with no abnormal mucus.  The septum was midline and non-obstructive, turbinates of normal size and  position.  No polyps, masses, or purulence noted on examination.    Neuro - Nonfocal neuro exam is normal, CN 2 through 12 intact, normal gait and muscle tone.      Performed in clinic today:  Attempts at mirror laryngoscopy were not possible due to gag reflex.  Therefore I proceeded with a fiberoptic examination.  First I sprayed both sides of the nose with a mixture of lidocaine and neosynephrine.  I then passed the scope through the nasal cavity.  The nasal cavity was unremarkable.  The nasopharynx was mucosally covered and symmetric.  The Eustachian tube openings were unobstructed.  Going further down I had a clear view of the base of tongue which had normal appearing lingual tonsillar tissue.  The base of tongue was free of lesions, and the vallecula was open.  The epiglottis was smooth and mucosally covered.  The supraglottic larynx was then clearly visualized.  The vocal cords moved smoothly and symmetrically, they were pearly white and no lesions were seen.  The pyriform sinuses were open, and the limited view of the postcricoid region did not show any lesions.  No edema noted on the aryepiglottic fold or piriform sinus area.  Blue Flexible scope - Olympus - [id number]      A/P - Paula Back is a 32 year old female with possibly musculoskeletal trauma as a result of chiropractic adjustment.  Currently symptoms are improving with mild residual symptomatology.  Reassured the patient.  As far as reflux is concerned certainly talk about more dietary restrictions as far as avoidance of late-night eating especially fatty greasy foods avoidance of caffeinated and carbonated products in the evenings.  She will try to do so.  Local heat as needed with ibuprofen will be used.  If patient continues to have symptoms after the next couple months she will come back.      Jason Cavazos MD

## 2023-03-24 ENCOUNTER — MYC MEDICAL ADVICE (OUTPATIENT)
Dept: OTOLARYNGOLOGY | Facility: CLINIC | Age: 33
End: 2023-03-24
Payer: COMMERCIAL

## 2023-04-04 ENCOUNTER — TRANSCRIBE ORDERS (OUTPATIENT)
Dept: OTHER | Age: 33
End: 2023-04-04

## 2023-04-04 DIAGNOSIS — Q04.8 CEREBELLAR TONSILLAR ECTOPIA (H): Primary | ICD-10-CM

## 2023-04-04 DIAGNOSIS — H47.10 PAPILLEDEMA: ICD-10-CM

## 2023-04-11 ENCOUNTER — OFFICE VISIT (OUTPATIENT)
Dept: NEUROSURGERY | Facility: CLINIC | Age: 33
End: 2023-04-11
Attending: STUDENT IN AN ORGANIZED HEALTH CARE EDUCATION/TRAINING PROGRAM
Payer: COMMERCIAL

## 2023-04-11 VITALS
SYSTOLIC BLOOD PRESSURE: 98 MMHG | BODY MASS INDEX: 44.3 KG/M2 | HEART RATE: 114 BPM | DIASTOLIC BLOOD PRESSURE: 66 MMHG | WEIGHT: 240.74 LBS | HEIGHT: 62 IN

## 2023-04-11 DIAGNOSIS — H47.10 PAPILLEDEMA: ICD-10-CM

## 2023-04-11 DIAGNOSIS — Q04.8 CEREBELLAR TONSILLAR ECTOPIA (H): Primary | ICD-10-CM

## 2023-04-11 PROCEDURE — 99212 OFFICE O/P EST SF 10 MIN: CPT | Performed by: NURSE PRACTITIONER

## 2023-04-11 PROCEDURE — 99204 OFFICE O/P NEW MOD 45 MIN: CPT | Performed by: NURSE PRACTITIONER

## 2023-04-11 RX ORDER — VILAZODONE HYDROCHLORIDE 20 MG/1
20 TABLET ORAL EVERY MORNING
COMMUNITY
Start: 2022-11-23

## 2023-04-11 NOTE — PATIENT INSTRUCTIONS
You met with Pediatric Neurosurgery at the [facility]    MADISON Blake Dr., Dr., NP      Pediatric Appointment Scheduling and Call Center:   [phone]    Nurse Practitioner  [phone]    Mailing Address  [address] [address]    Street Address   [address] [place] [id number]    Fax Number  [phone]    For urgent matters that cannot wait until the next business day, occur over a holiday and/or weekend, report directly to your nearest ER or you may call [phone] and ask to page the Pediatric Neurosurgery Resident on call.     Please bring ophthalmology visit note to next clinic visit

## 2023-04-11 NOTE — PROGRESS NOTES
Pediatric Neurosurgery Clinic    Dear Dr. French,   Thank you for referring Paula Back to the pediatric neurosurgery clinic at the [facility].   I had the opportunity to meet with Paula Back and parents on April 11, 2023.    As you know, Paula is a 32 year old female who presents to the clinic today for initial consultation to discuss papilledema and chiari discovered on imaging studies. We discussed that today, we do not have her MRI studies available but we will work on obtaining these. She notes that she recently had an eye exam approximately 1 month ago that revealed papilledema, she had this completed at [facility] in [place]. She has not been started on Diamox, but has been referred to neurology for assistance in management. She notes that by the end of the day her eyes get blurry. If she stands up quickly then she will 'black out' and lose her hearing, lasts 30 seconds and comes back to normal. She states this does not happen daily but happens often. She notes that she continues with headaches that 'come and go' and she notes they are 'not a major symptom, not debilitating'. She states she has headaches every other day or so, and they do not typically last long. She does note they typically start at the base of her skull and will sometimes radiate upwards. She notes sometimes on they radiate upwards toward the right side and radiates to right eye. Denies any headaches with valsalva, although she notes that if she moves her bowel she gets a sharp pain on the right side. She notes she continues on Tylenol and states she takes midol for diuretic effects. She reports pain on the left side of her body and has felt like her left side is weak, tried PT, chiropractor, but notes those have not helped significantly. She reports that the pain has radiated upwards and that her ribs are being squeezed around to her back. She notes some nerve pain over  her back and shoulders. She got injections in February that helped a little but went to the chiropractor and it got significantly worse afterwards. She states that about a week or so following the chiropractor adjustment, she had a hard time swallowing solid foods for a few days, she feels like something dislocated following that and it has been uncomfortable to swallow.  She notes some pain that radiates into groin area down posteriorly to knees, sometimes to feet. She notes numbness in her toes mostly positional. She notes back pain throughout her whole back. She notes that she has a gag reflex, she is eating well and not vomiting, she is sleeping well and is awake and active throughout the day. She does snore, and had a sleep study multiple years ago, but didn't notice any sleep apnea, and remembers them stating that it did not seem to be obstructive. She denies any change in bowel or bladder, notes intermittent pelvic pain for a few days, feels like a sensation of urination, typically goes away in a few days. She denies any recent falls, traumas or car accidents.She notes some disjointed speech intermittently. She also notes some struggles with balance and often trips to the side, she notes she has on/off hand dexterity concerns. She notes that she works as a . She also notes she suspects she has EDS and has follow up in September for ongoing follow up.     Past Medical History:  Papilledema  Disc herniations (saw spine surgeon, ordered injections; last surgeon was seen through [facility])  Multiple urological concerns    No past surgical history on file.  lasik   Tubal ligation    ALLERGIES:  Patient has no known allergies.    Current Outpatient Medications   Medication Sig Dispense Refill     acetaminophen (TYLENOL) 325 MG tablet Take 650 mg by mouth       albuterol (PROAIR HFA, PROVENTIL HFA, VENTOLIN HFA) 108 (90 BASE) MCG/ACT inhaler Inhale 2 puffs into the lungs every 6 hours as needed for  shortness of breath / dyspnea or wheezing 1 Inhaler 0     amphetamine-dextroamphetamine (ADDERALL XR) 15 MG 24 hr capsule Take 15 mg by mouth       amphetamine-dextroamphetamine (ADDERALL XR) 5 MG 24 hr capsule as directed Orally       clindamycin (CLINDAMAX) 1 % external gel        cyclobenzaprine (FLEXERIL) 5 MG tablet Take 5-10 mg by mouth       diphenhydrAMINE (BENADRYL) 25 MG tablet diphenhydramine 25 mg tablet   1/2-2 tabs by mouth every 6-8 hours if needed for anxiety or insomnia       DULoxetine (CYMBALTA) 30 MG capsule Take 1 capsule by mouth daily       ibuprofen (ADVIL/MOTRIN) 200 MG tablet every 6 hours       Ibuprofen 200 MG capsule Take 400 mg by mouth every 4 hours as needed for fever. 100 capsule 0     ondansetron (ZOFRAN) 4 MG tablet Take 4 mg by mouth       prochlorperazine (COMPAZINE) 5 MG tablet Take 5 mg by mouth       Semaglutide-Weight Management (WEGOVY) 2.4 MG/0.75ML pen Inject 2.4 mg Subcutaneous       vilazodone (VIIBRYD) 20 MG TABS tablet TAKE 1 TABLET BY MOUTH EVERY MORNING WITH BREAKFAST       citalopram (CELEXA) 20 MG tablet Take 1.5 tablets (30 mg) by mouth daily DUE for office appt with Reji before any further refills (Patient not taking: Reported on 3/23/2023) 45 tablet 0     MAGNESIUM PO Take by mouth At Bedtime (Patient not taking: Reported on 3/23/2023)       melatonin 5 MG tablet Take 5 mg by mouth At Bedtime (Patient not taking: Reported on 3/23/2023)       Multiple Vitamin (MULTIVITAMINS PO) Take by mouth daily 1/2 tab (Patient not taking: Reported on 3/23/2023)       predniSONE (DELTASONE) 20 MG tablet Take 3 tablets (60 mg) by mouth daily (Patient not taking: Reported on 3/23/2023) 15 tablet 0     Pseudoephedrine-DM-GG-APAP (DAYQUIL LIQUICAPS OR)  (Patient not taking: Reported on 3/23/2023)       RA KRILL  MG CAPS  (Patient not taking: Reported on 3/23/2023)       traMADol (ULTRAM) 50 MG tablet Take 1 tablet (50 mg) by mouth every 6 hours as needed for severe pain  "(Patient not taking: Reported on 3/23/2023) 15 tablet 0       Family History   Problem Relation Age of Onset     Diabetes Maternal Grandfather      Cancer Maternal Grandmother         lung      Cancer Maternal Grandfather         father     Alcohol/Drug Father      Alcohol/Drug Mother        Social History     Tobacco Use     Smoking status: Former     Packs/day: 0.25     Types: Cigarettes     Smokeless tobacco: Never     Tobacco comments:     she is not exposed to tobacco at home   Vaping Use     Vaping status: Not on file   Substance Use Topics     Alcohol use: Yes     Comment: occas.  10 drinks through the week         PHYSICAL EXAM:   BP 98/66 (BP Location: Right arm, Patient Position: Sitting, Cuff Size: Adult Large)   Pulse 114   Ht 1.582 m (5' 2.28\")   Wt 109.2 kg (240 lb 11.9 oz)   LMP 02/03/2023 (Approximate)   BMI 43.63 kg/m      Alert and oriented to person, place, and time. No acute distres.   PERRL, EOMI. Face symmetric. Tongue midline.   Strong eye closure and cheek puff.  No pronator drift.   BUE and BLE 5/5 throughout.   Reflexes 2+ throughout.   Sensation intact and symmetric to light touch throughout.   Normal FNF, normal HTS test. Gait is normal.     IMAGES: none available, we have requested     ASSESSMENT:  Paula Back, 32 year old female with hx chiari malformation and papilledema    PLAN:  - I would like to obtain Paula's imaging studies, as well as ophthalmology notes  -we discussed she should follow up with ophthalmology, although if she needs referral to neuro ophthalmology, we would be happy to place that order  -MRI full spine order placed with flexion/extension c spine MRI and CINE flow, we would like her to be seen after images obtained  - Paula Back and family were counseled to please contact us with any acute worsening of symptoms, or with any questions or concerns.     This patient was discussed with neurosurgery faculty, who agrees with the above.  Misa" MADISON Andrews on 4/11/2023 at 9:00 AM

## 2023-04-11 NOTE — LETTER
4/11/2023      RE: Paula Back  [id number] [facility] [address]     Dear Colleague,    Thank you for the opportunity to participate in the care of your patient, Paula Back, at the [facility] at [facility]. Please see a copy of my visit note below.           Pediatric Neurosurgery Clinic    Dear Dr. French,   Thank you for referring Paula Back to the pediatric neurosurgery clinic at the [facility].   I had the opportunity to meet with Paula Back and parents on April 11, 2023.    As you know, Paula is a 32 year old female who presents to the clinic today for initial consultation to discuss papilledema and chiari discovered on imaging studies. We discussed that today, we do not have her MRI studies available but we will work on obtaining these. She notes that she recently had an eye exam approximately 1 month ago that revealed papilledema, she had this completed at [facility] in [place]. She has not been started on Diamox, but has been referred to neurology for assistance in management. She notes that by the end of the day her eyes get blurry. If she stands up quickly then she will 'black out' and lose her hearing, lasts 30 seconds and comes back to normal. She states this does not happen daily but happens often. She notes that she continues with headaches that 'come and go' and she notes they are 'not a major symptom, not debilitating'. She states she has headaches every other day or so, and they do not typically last long. She does note they typically start at the base of her skull and will sometimes radiate upwards. She notes sometimes on they radiate upwards toward the right side and radiates to right eye. Denies any headaches with valsalva, although she notes that if she moves her bowel she gets a sharp pain on the right side. She notes she  continues on Tylenol and states she takes midol for diuretic effects. She reports pain on the left side of her body and has felt like her left side is weak, tried PT, chiropractor, but notes those have not helped significantly. She reports that the pain has radiated upwards and that her ribs are being squeezed around to her back. She notes some nerve pain over her back and shoulders. She got injections in February that helped a little but went to the chiropractor and it got significantly worse afterwards. She states that about a week or so following the chiropractor adjustment, she had a hard time swallowing solid foods for a few days, she feels like something dislocated following that and it has been uncomfortable to swallow.  She notes some pain that radiates into groin area down posteriorly to knees, sometimes to feet. She notes numbness in her toes mostly positional. She notes back pain throughout her whole back. She notes that she has a gag reflex, she is eating well and not vomiting, she is sleeping well and is awake and active throughout the day. She does snore, and had a sleep study multiple years ago, but didn't notice any sleep apnea, and remembers them stating that it did not seem to be obstructive. She denies any change in bowel or bladder, notes intermittent pelvic pain for a few days, feels like a sensation of urination, typically goes away in a few days. She denies any recent falls, traumas or car accidents.She notes some disjointed speech intermittently. She also notes some struggles with balance and often trips to the side, she notes she has on/off hand dexterity concerns. She notes that she works as a . She also notes she suspects she has EDS and has follow up in September for ongoing follow up.     Past Medical History:  Papilledema  Disc herniations (saw spine surgeon, ordered injections; last surgeon was seen through [facility])  Multiple urological concerns    No past surgical history  on file.  lasik   Tubal ligation    ALLERGIES:  Patient has no known allergies.    Current Outpatient Medications   Medication Sig Dispense Refill     acetaminophen (TYLENOL) 325 MG tablet Take 650 mg by mouth       albuterol (PROAIR HFA, PROVENTIL HFA, VENTOLIN HFA) 108 (90 BASE) MCG/ACT inhaler Inhale 2 puffs into the lungs every 6 hours as needed for shortness of breath / dyspnea or wheezing 1 Inhaler 0     amphetamine-dextroamphetamine (ADDERALL XR) 15 MG 24 hr capsule Take 15 mg by mouth       amphetamine-dextroamphetamine (ADDERALL XR) 5 MG 24 hr capsule as directed Orally       clindamycin (CLINDAMAX) 1 % external gel        cyclobenzaprine (FLEXERIL) 5 MG tablet Take 5-10 mg by mouth       diphenhydrAMINE (BENADRYL) 25 MG tablet diphenhydramine 25 mg tablet   1/2-2 tabs by mouth every 6-8 hours if needed for anxiety or insomnia       DULoxetine (CYMBALTA) 30 MG capsule Take 1 capsule by mouth daily       ibuprofen (ADVIL/MOTRIN) 200 MG tablet every 6 hours       Ibuprofen 200 MG capsule Take 400 mg by mouth every 4 hours as needed for fever. 100 capsule 0     ondansetron (ZOFRAN) 4 MG tablet Take 4 mg by mouth       prochlorperazine (COMPAZINE) 5 MG tablet Take 5 mg by mouth       Semaglutide-Weight Management (WEGOVY) 2.4 MG/0.75ML pen Inject 2.4 mg Subcutaneous       vilazodone (VIIBRYD) 20 MG TABS tablet TAKE 1 TABLET BY MOUTH EVERY MORNING WITH BREAKFAST       citalopram (CELEXA) 20 MG tablet Take 1.5 tablets (30 mg) by mouth daily DUE for office appt with Reji before any further refills (Patient not taking: Reported on 3/23/2023) 45 tablet 0     MAGNESIUM PO Take by mouth At Bedtime (Patient not taking: Reported on 3/23/2023)       melatonin 5 MG tablet Take 5 mg by mouth At Bedtime (Patient not taking: Reported on 3/23/2023)       Multiple Vitamin (MULTIVITAMINS PO) Take by mouth daily 1/2 tab (Patient not taking: Reported on 3/23/2023)       predniSONE (DELTASONE) 20 MG tablet Take 3 tablets (60 mg)  "by mouth daily (Patient not taking: Reported on 3/23/2023) 15 tablet 0     Pseudoephedrine-DM-GG-APAP (DAYQUIL LIQUICAPS OR)  (Patient not taking: Reported on 3/23/2023)       RA KRILL  MG CAPS  (Patient not taking: Reported on 3/23/2023)       traMADol (ULTRAM) 50 MG tablet Take 1 tablet (50 mg) by mouth every 6 hours as needed for severe pain (Patient not taking: Reported on 3/23/2023) 15 tablet 0       Family History   Problem Relation Age of Onset     Diabetes Maternal Grandfather      Cancer Maternal Grandmother         lung      Cancer Maternal Grandfather         father     Alcohol/Drug Father      Alcohol/Drug Mother        Social History     Tobacco Use     Smoking status: Former     Packs/day: 0.25     Types: Cigarettes     Smokeless tobacco: Never     Tobacco comments:     she is not exposed to tobacco at home   Vaping Use     Vaping status: Not on file   Substance Use Topics     Alcohol use: Yes     Comment: occas.  10 drinks through the week         PHYSICAL EXAM:   BP 98/66 (BP Location: Right arm, Patient Position: Sitting, Cuff Size: Adult Large)   Pulse 114   Ht 1.582 m (5' 2.28\")   Wt 109.2 kg (240 lb 11.9 oz)   LMP 02/03/2023 (Approximate)   BMI 43.63 kg/m      Alert and oriented to person, place, and time. No acute distres.   PERRL, EOMI. Face symmetric. Tongue midline.   Strong eye closure and cheek puff.  No pronator drift.   BUE and BLE 5/5 throughout.   Reflexes 2+ throughout.   Sensation intact and symmetric to light touch throughout.   Normal FNF, normal HTS test. Gait is normal.     IMAGES: none available, we have requested     ASSESSMENT:  Paula Back, 32 year old female with hx chiari malformation and papilledema    PLAN:  - I would like to obtain Paula's imaging studies, as well as ophthalmology notes  -we discussed she should follow up with ophthalmology, although if she needs referral to neuro ophthalmology, we would be happy to place that order  -MRI full spine " order placed with flexion/extension c spine MRI and CINE flow, we would like her to be seen after images obtained  - Paula Back and family were counseled to please contact us with any acute worsening of symptoms, or with any questions or concerns.     This patient was discussed with neurosurgery faculty, who agrees with the above.  Misa Andrews NP on 4/11/2023 at 9:00 AM      Please do not hesitate to contact me if you have any questions/concerns.     Sincerely,       Misa Andrews NP

## 2023-04-11 NOTE — NURSING NOTE
"Chief Complaint   Patient presents with     Consult     Cerebellar tonsillar ectopia 'having difficulty with weakness specifically on the left side' 'Experiencing neck pain, head pressure, dizziness throughout the day'       Vitals:    04/11/23 0844   BP: 98/66   BP Location: Right arm   Patient Position: Sitting   Cuff Size: Adult Large   Pulse: 114   Weight: 240 lb 11.9 oz (109.2 kg)   Height: 5' 2.28\" (158.2 cm)       Patient MyChart Active? Yes  If no, would they like to sign up? N/A    Has patient signed a Consent to Communicate form to discuss health information with guardians if applicable? Does not apply     Does patient need PHQ-2 completed today? No    Depression Response    Patient completed the PHQ-9 assessment for depression and scored >9? Does not apply   Question 9 on the PHQ-9 was positive for suicidality? Does not apply   Does patient have current mental health provider? Does not apply     I personally notified the following:      Nusrat Young, EMT  April 11, 2023  "

## 2023-04-12 ENCOUNTER — TRANSFERRED RECORDS (OUTPATIENT)
Dept: HEALTH INFORMATION MANAGEMENT | Facility: CLINIC | Age: 33
End: 2023-04-12
Payer: COMMERCIAL

## 2023-04-16 ENCOUNTER — HEALTH MAINTENANCE LETTER (OUTPATIENT)
Age: 33
End: 2023-04-16

## 2023-04-19 ENCOUNTER — TELEPHONE (OUTPATIENT)
Dept: NEUROSURGERY | Facility: CLINIC | Age: 33
End: 2023-04-19
Payer: COMMERCIAL

## 2023-04-19 NOTE — TELEPHONE ENCOUNTER
Records recvd from [facility] and sent to [facility]. Still waiting for [facility].

## 2023-04-19 NOTE — TELEPHONE ENCOUNTER
----- Message from Misa Andrews NP sent at 4/18/2023  9:23 PM CDT -----  Zafar Quach  Would you mind helping me obtain Mri brain and neck done at [facility], MRI brain at [facility] of neurology and optho notes from [facility] in [place]?      Thanks a MILLION!

## 2023-04-19 NOTE — TELEPHONE ENCOUNTER
Fax request sent to [facility] for all spine and head images. I called [phone] Fax: [phone] [facility] to request ophthalmology records. [facility] radiology also called for tammy Brain and spine Images at Tel [phone] but they only had cervical Images, these were pushed and loaded into PACS

## 2023-04-24 ENCOUNTER — HOSPITAL ENCOUNTER (OUTPATIENT)
Dept: MRI IMAGING | Facility: CLINIC | Age: 33
Discharge: HOME | End: 2023-04-24
Attending: NURSE PRACTITIONER
Payer: COMMERCIAL

## 2023-04-24 DIAGNOSIS — H47.10 PAPILLEDEMA: ICD-10-CM

## 2023-04-24 DIAGNOSIS — Q04.8 CEREBELLAR TONSILLAR ECTOPIA (H): ICD-10-CM

## 2023-04-24 PROCEDURE — 72141 MRI NECK SPINE W/O DYE: CPT

## 2023-04-24 PROCEDURE — 70551 MRI BRAIN STEM W/O DYE: CPT

## 2023-04-24 PROCEDURE — 72148 MRI LUMBAR SPINE W/O DYE: CPT

## 2023-04-24 PROCEDURE — 72146 MRI CHEST SPINE W/O DYE: CPT

## 2023-05-03 ENCOUNTER — OFFICE VISIT (OUTPATIENT)
Dept: NEUROSURGERY | Facility: CLINIC | Age: 33
End: 2023-05-03
Attending: NURSE PRACTITIONER
Payer: COMMERCIAL

## 2023-05-03 VITALS
SYSTOLIC BLOOD PRESSURE: 114 MMHG | HEIGHT: 62 IN | OXYGEN SATURATION: 99 % | HEART RATE: 99 BPM | BODY MASS INDEX: 43.21 KG/M2 | WEIGHT: 234.79 LBS | DIASTOLIC BLOOD PRESSURE: 67 MMHG

## 2023-05-03 DIAGNOSIS — H47.10 PAPILLEDEMA: Primary | ICD-10-CM

## 2023-05-03 DIAGNOSIS — G93.5 CHIARI MALFORMATION TYPE I (H): ICD-10-CM

## 2023-05-03 PROCEDURE — 99215 OFFICE O/P EST HI 40 MIN: CPT | Performed by: NURSE PRACTITIONER

## 2023-05-03 PROCEDURE — 99212 OFFICE O/P EST SF 10 MIN: CPT | Performed by: NURSE PRACTITIONER

## 2023-05-03 RX ORDER — TRAZODONE HYDROCHLORIDE 50 MG/1
50 TABLET, FILM COATED ORAL AT BEDTIME
COMMUNITY
Start: 2023-03-30

## 2023-05-03 RX ORDER — DEXTROAMPHETAMINE SACCHARATE, AMPHETAMINE ASPARTATE MONOHYDRATE, DEXTROAMPHETAMINE SULFATE AND AMPHETAMINE SULFATE 7.5; 7.5; 7.5; 7.5 MG/1; MG/1; MG/1; MG/1
30 CAPSULE, EXTENDED RELEASE ORAL EVERY MORNING
COMMUNITY
Start: 2023-02-20

## 2023-05-03 RX ORDER — DEXTROAMPHETAMINE SACCHARATE, AMPHETAMINE ASPARTATE, DEXTROAMPHETAMINE SULFATE AND AMPHETAMINE SULFATE 2.5; 2.5; 2.5; 2.5 MG/1; MG/1; MG/1; MG/1
10 TABLET ORAL DAILY
COMMUNITY
Start: 2022-07-08 | End: 2023-05-14

## 2023-05-03 RX ORDER — PROPRANOLOL HYDROCHLORIDE 10 MG/1
10 TABLET ORAL PRN
COMMUNITY
Start: 2022-03-02 | End: 2024-04-01

## 2023-05-03 NOTE — NURSING NOTE
"Chief Complaint   Patient presents with     RECHECK       Vitals:    05/03/23 1524   BP: 114/67   BP Location: Right arm   Patient Position: Sitting   Cuff Size: Adult Large   Pulse: 99   SpO2: 99%   Weight: 234 lb 12.6 oz (106.5 kg)   Height: 5' 2.01\" (157.5 cm)       PHQ done at last appointment    Patient MyChart Active? Yes  If no, would they like to sign up? N/A    Has patient signed a Consent to Communicate form to discuss health information with guardians if applicable? Does not apply     Does patient need PHQ-2 completed today? Yes    Depression Response    Patient completed the PHQ-9 assessment for depression and scored >9? Does not apply   Question 9 on the PHQ-9 was positive for suicidality? Does not apply   Does patient have current mental health provider? Does not apply     I personally notified the following:      Aleyda Rouse  May 3, 2023  "

## 2023-05-03 NOTE — LETTER
5/3/2023      RE: Paula TIERNEY Running  [address] [facility] [address]     Dear Colleague,    Thank you for the opportunity to participate in the care of your patient, Paula Back, at the [facility] at [facility]. Please see a copy of my visit note below.           Neurosurgery Clinic    Thank you for referring Paula Back to the neurosurgery clinic at the [facility].   I had the opportunity to meet with Paula TIERNEY Running on May 3, 2023.    As you know, Paula is a 32 year old female who was initially seen on April 11, 2023. Please see that note for extensive symptom discussion. Of note, she presented to discuss papilledema and chiari malformation seen on imaging studies. She notes that she continues with pressure headaches over her right eye and behind and a burning sensation over her left eye radiating to the top of her head. She notes that they occur daily and last approximately 10 minutes, she continues on Midol complete daily. She notes shifting positions helps improve them. She notes that position makes them worse as well as coughing. She notes some right midline neck discomfort and the posterior neck worse with coughing. Continues with numbness and feeling like she is being pulled down over her shoulders and down right side which has been ongoing. She notes achey pain around her left side that has remained in the same location but has increased in severity. She notes a worsening of mental fogginess. She also notes night sweats and temperature dysregulation, she notes she will be drenched in sweat but with chills, no fevers. Difficulty with swallowing and notes some swelling posterior neck on the right side. She reports chest pain that she notes travels from her throat down and has been having heart palpitations.  She notes that she was told by ophthalmology she  had a Level 1-2 papilledema on both eyes. She notes that she lacks urinary urgency, no incontinence; increase in constipation, needs a laxative    No past medical history on file.    No past surgical history on file.    ALLERGIES:  Patient has no known allergies.    Current Outpatient Medications   Medication Sig Dispense Refill     acetaminophen (TYLENOL) 325 MG tablet Take 650 mg by mouth       albuterol (PROAIR HFA, PROVENTIL HFA, VENTOLIN HFA) 108 (90 BASE) MCG/ACT inhaler Inhale 2 puffs into the lungs every 6 hours as needed for shortness of breath / dyspnea or wheezing 1 Inhaler 0     amphetamine-dextroamphetamine (ADDERALL XR) 15 MG 24 hr capsule Take 15 mg by mouth       amphetamine-dextroamphetamine (ADDERALL XR) 5 MG 24 hr capsule as directed Orally       citalopram (CELEXA) 20 MG tablet Take 1.5 tablets (30 mg) by mouth daily DUE for office appt with Reji before any further refills (Patient not taking: Reported on 3/23/2023) 45 tablet 0     clindamycin (CLINDAMAX) 1 % external gel        cyclobenzaprine (FLEXERIL) 5 MG tablet Take 5-10 mg by mouth       diphenhydrAMINE (BENADRYL) 25 MG tablet diphenhydramine 25 mg tablet   1/2-2 tabs by mouth every 6-8 hours if needed for anxiety or insomnia       DULoxetine (CYMBALTA) 30 MG capsule Take 1 capsule by mouth daily       ibuprofen (ADVIL/MOTRIN) 200 MG tablet every 6 hours       Ibuprofen 200 MG capsule Take 400 mg by mouth every 4 hours as needed for fever. 100 capsule 0     MAGNESIUM PO Take by mouth At Bedtime (Patient not taking: Reported on 3/23/2023)       melatonin 5 MG tablet Take 5 mg by mouth At Bedtime (Patient not taking: Reported on 3/23/2023)       Multiple Vitamin (MULTIVITAMINS PO) Take by mouth daily 1/2 tab (Patient not taking: Reported on 3/23/2023)       ondansetron (ZOFRAN) 4 MG tablet Take 4 mg by mouth       predniSONE (DELTASONE) 20 MG tablet Take 3 tablets (60 mg) by mouth daily (Patient not taking: Reported on 3/23/2023) 15 tablet  "0     prochlorperazine (COMPAZINE) 5 MG tablet Take 5 mg by mouth       Pseudoephedrine-DM-GG-APAP (DAYQUIL LIQUICAPS OR)  (Patient not taking: Reported on 3/23/2023)       RA KRILL  MG CAPS  (Patient not taking: Reported on 3/23/2023)       Semaglutide-Weight Management (WEGOVY) 2.4 MG/0.75ML pen Inject 2.4 mg Subcutaneous       traMADol (ULTRAM) 50 MG tablet Take 1 tablet (50 mg) by mouth every 6 hours as needed for severe pain (Patient not taking: Reported on 3/23/2023) 15 tablet 0     vilazodone (VIIBRYD) 20 MG TABS tablet TAKE 1 TABLET BY MOUTH EVERY MORNING WITH BREAKFAST         Family History   Problem Relation Age of Onset     Diabetes Maternal Grandfather      Cancer Maternal Grandmother         lung      Cancer Maternal Grandfather         father     Alcohol/Drug Father      Alcohol/Drug Mother        Social History     Tobacco Use     Smoking status: Former     Packs/day: 0.25     Types: Cigarettes     Smokeless tobacco: Never     Tobacco comments:     she is not exposed to tobacco at home   Vaping Use     Vaping status: Not on file   Substance Use Topics     Alcohol use: Yes     Comment: occas.  10 drinks through the week         PHYSICAL EXAM:   /67 (BP Location: Right arm, Patient Position: Sitting, Cuff Size: Adult Large)   Pulse 99   Ht 1.575 m (5' 2.01\")   Wt 106.5 kg (234 lb 12.6 oz)   SpO2 99%   BMI 42.93 kg/m      Alert and oriented to person, place, and time. No acute distres.   PERRL, EOMI. Face symmetric. Tongue midline.   Strong eye closure and cheek puff.  No pronator drift.   BUE and BLE 5/5 throughout.   Reflexes 2+ throughout.   Sensation intact and symmetric to light touch throughout.   Normal FNF, normal HTS test. Gait is normal.     IMAGES: MRIs reviewed with patient and her step mother  MRI brain:   Impression: Redemonstrated cerebellar tonsillar ectopia measuring up to 1 cm below the foramen magnum. This is again in a manner compatible with a Chiari I malformation. " CSF flow study demonstrates abundant  flow ventral to the cervicomedullary junction, with additionally preserved flow across the foramen of Magendie. There is only a very small amount of flow dorsal to the cerebellar tonsils.    MRI spine:  Cervical: Cervical spine degenerative change as above without high-grade spinal canal stenosis at any level. There is an unchanged moderate to severe left C4-C5 and mild to moderate left C5-C6 neural foraminal stenosis with moderate narrowing of the left C4-C5 lateral recess.    Thoracic: Overall unremarkable thoracic spine MRI without significant degenerative change. No high-grade spinal canal or neural foraminal stenosis    Lumbar: 1.  Multilevel mild facet arthropathy in the lumbar spine as detailed above. Accompanying degenerative disc disease is overall minimal, with small posterior disc bulges that contribute to mild bilateral neural foraminal stenosis at the L2-L3 to the L4-L5 levels. There is only mild spinal canal stenosis at the L4-L5 level, with no high-grade spinal canal stenosis at any level.    ASSESSMENT:  Paula Back, 32 year old female with chiari malformation and papiledema, ongoing symptoms    PLAN:  - Advised Paula to follow up with primary care regarding chest pain and palpitation concerns, she state it is not currently happening at this time. I advised her to present to the emergency department as well if she has any worsening chest pain. Advised her to also discuss renal results on MRI with PCP to discuss additional imaging or referral  -we discussed an appointment with Dr. Olivares or Dr. Rodriguez to discuss papilledema as well as chiari malformation and patients concern with spinal stenosis  - Paula Back and family were counseled to please contact us with any acute worsening of symptoms, or with any questions or concerns.       Misa Andrews NP  Department of Neurosurgery  [phone]    This patient was discussed with neurosurgery faculty,  who agrees with the above.  Mias Andrews NP on 5/3/2023 at 2:22 PM

## 2023-05-03 NOTE — PATIENT INSTRUCTIONS
You met with Pediatric Neurosurgery at the [facility]    [address] Dr., NP      Pediatric Appointment Scheduling and Call Center:   [phone]    Nurse Practitioner  [phone]    Mailing Address  [address] [address]    Street Address   [address] [place] [id number]    Fax Number  [phone]    For urgent matters that cannot wait until the next business day, occur over a holiday and/or weekend, report directly to your nearest ER or you may call [phone] and ask to page the Pediatric Neurosurgery Resident on call.

## 2023-05-03 NOTE — PROGRESS NOTES
Neurosurgery Clinic    Thank you for referring Paula Back to the neurosurgery clinic at the [facility].   I had the opportunity to meet with Paula TIERNEY Running on May 3, 2023.    As you know, Paula is a 32 year old female who was initially seen on April 11, 2023. Please see that note for extensive symptom discussion. Of note, she presented to discuss papilledema and chiari malformation seen on imaging studies. She notes that she continues with pressure headaches over her right eye and behind and a burning sensation over her left eye radiating to the top of her head. She notes that they occur daily and last approximately 10 minutes, she continues on Midol complete daily. She notes shifting positions helps improve them. She notes that position makes them worse as well as coughing. She notes some right midline neck discomfort and the posterior neck worse with coughing. Continues with numbness and feeling like she is being pulled down over her shoulders and down right side which has been ongoing. She notes achey pain around her left side that has remained in the same location but has increased in severity. She notes a worsening of mental fogginess. She also notes night sweats and temperature dysregulation, she notes she will be drenched in sweat but with chills, no fevers. Difficulty with swallowing and notes some swelling posterior neck on the right side. She reports chest pain that she notes travels from her throat down and has been having heart palpitations.  She notes that she was told by ophthalmology she had a Level 1-2 papilledema on both eyes. She notes that she lacks urinary urgency, no incontinence; increase in constipation, needs a laxative    No past medical history on file.    No past surgical history on file.    ALLERGIES:  Patient has no known allergies.    Current Outpatient Medications   Medication Sig Dispense Refill     acetaminophen (TYLENOL) 325  MG tablet Take 650 mg by mouth       albuterol (PROAIR HFA, PROVENTIL HFA, VENTOLIN HFA) 108 (90 BASE) MCG/ACT inhaler Inhale 2 puffs into the lungs every 6 hours as needed for shortness of breath / dyspnea or wheezing 1 Inhaler 0     amphetamine-dextroamphetamine (ADDERALL XR) 15 MG 24 hr capsule Take 15 mg by mouth       amphetamine-dextroamphetamine (ADDERALL XR) 5 MG 24 hr capsule as directed Orally       citalopram (CELEXA) 20 MG tablet Take 1.5 tablets (30 mg) by mouth daily DUE for office appt with Reji before any further refills (Patient not taking: Reported on 3/23/2023) 45 tablet 0     clindamycin (CLINDAMAX) 1 % external gel        cyclobenzaprine (FLEXERIL) 5 MG tablet Take 5-10 mg by mouth       diphenhydrAMINE (BENADRYL) 25 MG tablet diphenhydramine 25 mg tablet   1/2-2 tabs by mouth every 6-8 hours if needed for anxiety or insomnia       DULoxetine (CYMBALTA) 30 MG capsule Take 1 capsule by mouth daily       ibuprofen (ADVIL/MOTRIN) 200 MG tablet every 6 hours       Ibuprofen 200 MG capsule Take 400 mg by mouth every 4 hours as needed for fever. 100 capsule 0     MAGNESIUM PO Take by mouth At Bedtime (Patient not taking: Reported on 3/23/2023)       melatonin 5 MG tablet Take 5 mg by mouth At Bedtime (Patient not taking: Reported on 3/23/2023)       Multiple Vitamin (MULTIVITAMINS PO) Take by mouth daily 1/2 tab (Patient not taking: Reported on 3/23/2023)       ondansetron (ZOFRAN) 4 MG tablet Take 4 mg by mouth       predniSONE (DELTASONE) 20 MG tablet Take 3 tablets (60 mg) by mouth daily (Patient not taking: Reported on 3/23/2023) 15 tablet 0     prochlorperazine (COMPAZINE) 5 MG tablet Take 5 mg by mouth       Pseudoephedrine-DM-GG-APAP (DAYQUIL LIQUICAPS OR)  (Patient not taking: Reported on 3/23/2023)       RA KRILL  MG CAPS  (Patient not taking: Reported on 3/23/2023)       Semaglutide-Weight Management (WEGOVY) 2.4 MG/0.75ML pen Inject 2.4 mg Subcutaneous       traMADol (ULTRAM) 50 MG  "tablet Take 1 tablet (50 mg) by mouth every 6 hours as needed for severe pain (Patient not taking: Reported on 3/23/2023) 15 tablet 0     vilazodone (VIIBRYD) 20 MG TABS tablet TAKE 1 TABLET BY MOUTH EVERY MORNING WITH BREAKFAST         Family History   Problem Relation Age of Onset     Diabetes Maternal Grandfather      Cancer Maternal Grandmother         lung      Cancer Maternal Grandfather         father     Alcohol/Drug Father      Alcohol/Drug Mother        Social History     Tobacco Use     Smoking status: Former     Packs/day: 0.25     Types: Cigarettes     Smokeless tobacco: Never     Tobacco comments:     she is not exposed to tobacco at home   Vaping Use     Vaping status: Not on file   Substance Use Topics     Alcohol use: Yes     Comment: occas.  10 drinks through the week         PHYSICAL EXAM:   /67 (BP Location: Right arm, Patient Position: Sitting, Cuff Size: Adult Large)   Pulse 99   Ht 1.575 m (5' 2.01\")   Wt 106.5 kg (234 lb 12.6 oz)   SpO2 99%   BMI 42.93 kg/m      Alert and oriented to person, place, and time. No acute distres.   PERRL, EOMI. Face symmetric. Tongue midline.   Strong eye closure and cheek puff.  No pronator drift.   BUE and BLE 5/5 throughout.   Reflexes 2+ throughout.   Sensation intact and symmetric to light touch throughout.   Normal FNF, normal HTS test. Gait is normal.     IMAGES: MRIs reviewed with patient and her step mother  MRI brain:   Impression: Redemonstrated cerebellar tonsillar ectopia measuring up to 1 cm below the foramen magnum. This is again in a manner compatible with a Chiari I malformation. CSF flow study demonstrates abundant  flow ventral to the cervicomedullary junction, with additionally preserved flow across the foramen of Magendie. There is only a very small amount of flow dorsal to the cerebellar tonsils.    MRI spine:  Cervical: Cervical spine degenerative change as above without high-grade spinal canal stenosis at any level. There is an " unchanged moderate to severe left C4-C5 and mild to moderate left C5-C6 neural foraminal stenosis with moderate narrowing of the left C4-C5 lateral recess.    Thoracic: Overall unremarkable thoracic spine MRI without significant degenerative change. No high-grade spinal canal or neural foraminal stenosis    Lumbar: 1.  Multilevel mild facet arthropathy in the lumbar spine as detailed above. Accompanying degenerative disc disease is overall minimal, with small posterior disc bulges that contribute to mild bilateral neural foraminal stenosis at the L2-L3 to the L4-L5 levels. There is only mild spinal canal stenosis at the L4-L5 level, with no high-grade spinal canal stenosis at any level.    ASSESSMENT:  Paula Back, 32 year old female with chiari malformation and papiledema, ongoing symptoms    PLAN:  - Advised Paula to follow up with primary care regarding chest pain and palpitation concerns, she state it is not currently happening at this time. I advised her to present to the emergency department as well if she has any worsening chest pain. Advised her to also discuss renal results on MRI with PCP to discuss additional imaging or referral  -we discussed an appointment with Dr. Olivares or Dr. Rodriguez to discuss papilledema as well as chiari malformation and patients concern with spinal stenosis  - Paula Back and family were counseled to please contact us with any acute worsening of symptoms, or with any questions or concerns.       Misa Andrews NP  Department of Neurosurgery  [phone]    This patient was discussed with neurosurgery faculty, who agrees with the above.  Misa Andrews NP on 5/3/2023 at 2:22 PM

## 2023-05-04 ENCOUNTER — TELEPHONE (OUTPATIENT)
Dept: NEUROSURGERY | Facility: CLINIC | Age: 33
End: 2023-05-04
Payer: COMMERCIAL

## 2023-05-04 ENCOUNTER — MYC MEDICAL ADVICE (OUTPATIENT)
Dept: OTOLARYNGOLOGY | Facility: CLINIC | Age: 33
End: 2023-05-04
Payer: COMMERCIAL

## 2023-05-04 NOTE — TELEPHONE ENCOUNTER
[facility] [place] [facility], they are faxing what they have to [phone].

## 2023-05-04 NOTE — TELEPHONE ENCOUNTER
----- Message from Misa Andrews NP sent at 5/3/2023  3:59 PM CDT -----  Zafar Quach  Could you please help us get Paula's ophthalmology reports from [place] in 2017 as well as [facility] in [place] or [place]. Dr angelo. Thanks a million

## 2023-05-05 ENCOUNTER — TELEPHONE (OUTPATIENT)
Dept: NEUROSURGERY | Facility: CLINIC | Age: 33
End: 2023-05-05
Payer: COMMERCIAL

## 2023-05-05 NOTE — TELEPHONE ENCOUNTER
[facility] Call Center    Phone Message    May a detailed message be left on voicemail: yes     Reason for Call: Pt said that Misa Andrews was supposed to put in a referral for her to see Dr. Olivares.  She said that she hasn't heard from anyone yet.  She would like to know how to get this set up.  Thanks.

## 2023-05-05 NOTE — TELEPHONE ENCOUNTER
Writer routed to Misa Andrews CNP  *Patient requesting referral     Aimee Meyer LPN  Neurosurgery

## 2023-05-16 DIAGNOSIS — G93.5 CHIARI MALFORMATION TYPE I (H): Primary | ICD-10-CM

## 2023-05-23 ENCOUNTER — OFFICE VISIT (OUTPATIENT)
Dept: NEUROSURGERY | Facility: CLINIC | Age: 33
End: 2023-05-23
Attending: NEUROLOGICAL SURGERY
Payer: COMMERCIAL

## 2023-05-23 ENCOUNTER — HOSPITAL ENCOUNTER (OUTPATIENT)
Dept: CT IMAGING | Facility: CLINIC | Age: 33
Discharge: HOME | End: 2023-05-23
Attending: NURSE PRACTITIONER
Payer: COMMERCIAL

## 2023-05-23 VITALS
DIASTOLIC BLOOD PRESSURE: 72 MMHG | HEART RATE: 86 BPM | WEIGHT: 232.14 LBS | SYSTOLIC BLOOD PRESSURE: 104 MMHG | BODY MASS INDEX: 42.72 KG/M2 | HEIGHT: 62 IN

## 2023-05-23 DIAGNOSIS — H47.10 PAPILLEDEMA: Primary | ICD-10-CM

## 2023-05-23 DIAGNOSIS — G93.5 CHIARI MALFORMATION TYPE I (H): ICD-10-CM

## 2023-05-23 PROCEDURE — 250N000011 HC RX IP 250 OP 636: Performed by: NURSE PRACTITIONER

## 2023-05-23 PROCEDURE — 70498 CT ANGIOGRAPHY NECK: CPT | Mod: 26 | Performed by: RADIOLOGY

## 2023-05-23 PROCEDURE — 99212 OFFICE O/P EST SF 10 MIN: CPT | Performed by: NEUROLOGICAL SURGERY

## 2023-05-23 PROCEDURE — 250N000009 HC RX 250: Performed by: NURSE PRACTITIONER

## 2023-05-23 PROCEDURE — 70496 CT ANGIOGRAPHY HEAD: CPT

## 2023-05-23 PROCEDURE — 70498 CT ANGIOGRAPHY NECK: CPT

## 2023-05-23 PROCEDURE — 70496 CT ANGIOGRAPHY HEAD: CPT | Mod: 26 | Performed by: RADIOLOGY

## 2023-05-23 PROCEDURE — 99213 OFFICE O/P EST LOW 20 MIN: CPT | Performed by: NEUROLOGICAL SURGERY

## 2023-05-23 RX ORDER — AMOXICILLIN 500 MG/1
500 CAPSULE ORAL 2 TIMES DAILY
COMMUNITY
End: 2023-06-02

## 2023-05-23 RX ORDER — IOPAMIDOL 755 MG/ML
100 INJECTION, SOLUTION INTRAVASCULAR ONCE
Status: COMPLETED | OUTPATIENT
Start: 2023-05-23 | End: 2023-05-23

## 2023-05-23 RX ADMIN — IOPAMIDOL 75 ML: 755 INJECTION, SOLUTION INTRAVENOUS at 13:39

## 2023-05-23 RX ADMIN — SODIUM CHLORIDE 80 ML: 9 INJECTION, SOLUTION INTRAVENOUS at 13:39

## 2023-05-23 NOTE — LETTER
5/23/2023      RE: Paula Back  [id number] [facility] [address]     Dear Colleague,    Thank you for the opportunity to participate in the care of your patient, Paula Back, at the [facility]. Please see a copy of my visit note below.           Pediatric Neurosurgery Clinic    Dear Dr. French,   Thank you for referring Paula Back to the pediatric neurosurgery clinic at the [facility].   I had the opportunity to meet with Paula Back and parents on May 23, 2023.    As you know, Paula is a 32 year old female referred for findings of papilledema and Chiari I malformation noted on MRI. We have been following her in neurosurgery clinic for the past month. She notes her symptoms originally started in 2017 with papilledema, she was evaluated by neurology who had placed her on Diamox briefly, she was then sent to [facility] who stated she had no papilledema and was discharged. She notes that ovoer the past 1 year, she has had worsening neurological concerns and symptoms. She notes today she has had a worsening of black out spells that occur daily when she stands up or if she goes from lying down to sitting upright, usually happens more to the end of the day. She notes she felt like she almost loss consciousness yesterday, she was kneeling down, stood up and lost vision and felt extreme pressure in her head.     She notes that her headaches have been increasing, posteriorly, left temporal area and behind right eye. She notes behind her right eye she has a pressure headache, the left temporal area feels like a brain freeze and posteriorly is an ache. She notes these are also worse towards the end of the day and worse when she is upright for a while. She notes she always feels better when lying down. She notes that eye and left temporal headaches are worse  with coughing/laughing, no worsening posterior pain with valsalva. She notes that she recently had strep, she has an ENT appointment at the end of the month. She notes at that time she had some difficulty swallowing, but otherwise swallows okay.     She notes ongoing mental fogginess, which is significantly worse by the end of the day.     She notes that she has had to walk with a walker when out of the house, notes she has ongoing fatigue and needs to sit down.   She notes increasing dizziness    [facility] ophthalmology, Dr. Ramos noted at initial visit right eye was grade 2 and left eye was grade 1; she notes that at her last visit, he had noted both eyes were between grade 1-2. She denies any vision changes, she does note that sometimes she gets blurry vision by the end of the day.     No past medical history on file.    No past surgical history on file.    ALLERGIES:  Patient has no known allergies.    Current Outpatient Medications   Medication Sig Dispense Refill    acetaminophen (TYLENOL) 325 MG tablet Take 650 mg by mouth      albuterol (PROAIR HFA, PROVENTIL HFA, VENTOLIN HFA) 108 (90 BASE) MCG/ACT inhaler Inhale 2 puffs into the lungs every 6 hours as needed for shortness of breath / dyspnea or wheezing 1 Inhaler 0    amphetamine-dextroamphetamine (ADDERALL XR) 15 MG 24 hr capsule Take 15 mg by mouth (Patient not taking: Reported on 5/3/2023)      amphetamine-dextroamphetamine (ADDERALL XR) 20 MG 24 hr capsule Take 20 mg by mouth daily      amphetamine-dextroamphetamine (ADDERALL XR) 5 MG 24 hr capsule as directed Orally (Patient not taking: Reported on 5/3/2023)      citalopram (CELEXA) 20 MG tablet Take 1.5 tablets (30 mg) by mouth daily DUE for office appt with Reji before any further refills (Patient not taking: Reported on 3/23/2023) 45 tablet 0    clindamycin (CLINDAMAX) 1 % external gel       cyclobenzaprine (FLEXERIL) 5 MG tablet Take 5-10 mg by mouth (Patient not taking: Reported on  5/3/2023)      diphenhydrAMINE (BENADRYL) 25 MG tablet diphenhydramine 25 mg tablet   1/2-2 tabs by mouth every 6-8 hours if needed for anxiety or insomnia      DULoxetine (CYMBALTA) 30 MG capsule Take 1 capsule by mouth daily      ibuprofen (ADVIL/MOTRIN) 200 MG tablet every 6 hours      Ibuprofen 200 MG capsule Take 400 mg by mouth every 4 hours as needed for fever. 100 capsule 0    MAGNESIUM PO Take by mouth At Bedtime (Patient not taking: Reported on 3/23/2023)      melatonin 5 MG tablet Take 5 mg by mouth At Bedtime (Patient not taking: Reported on 3/23/2023)      Multiple Vitamin (MULTIVITAMINS PO) Take by mouth daily 1/2 tab (Patient not taking: Reported on 3/23/2023)      ondansetron (ZOFRAN) 4 MG tablet Take 4 mg by mouth      predniSONE (DELTASONE) 20 MG tablet Take 3 tablets (60 mg) by mouth daily (Patient not taking: Reported on 3/23/2023) 15 tablet 0    prochlorperazine (COMPAZINE) 5 MG tablet Take 5 mg by mouth      propranolol (INDERAL) 10 MG tablet Take 10 mg by mouth as needed      Pseudoephedrine-DM-GG-APAP (DAYQUIL LIQUICAPS OR)  (Patient not taking: Reported on 3/23/2023)      RA KRILL  MG CAPS  (Patient not taking: Reported on 3/23/2023)      Semaglutide-Weight Management (WEGOVY) 2.4 MG/0.75ML pen Inject 2.4 mg Subcutaneous      traMADol (ULTRAM) 50 MG tablet Take 1 tablet (50 mg) by mouth every 6 hours as needed for severe pain (Patient not taking: Reported on 3/23/2023) 15 tablet 0    traZODone (DESYREL) 50 MG tablet Take 50 mg by mouth daily      vilazodone (VIIBRYD) 20 MG TABS tablet TAKE 1 TABLET BY MOUTH EVERY MORNING WITH BREAKFAST         Family History   Problem Relation Age of Onset    Diabetes Maternal Grandfather     Cancer Maternal Grandmother         lung     Cancer Maternal Grandfather         father    Alcohol/Drug Father     Alcohol/Drug Mother        Social History     Tobacco Use    Smoking status: Former     Packs/day: 0.25     Types: Cigarettes    Smokeless tobacco:  "Never    Tobacco comments:     she is not exposed to tobacco at home   Vaping Use    Vaping status: Not on file   Substance Use Topics    Alcohol use: Yes     Comment: occas.  10 drinks through the week         PHYSICAL EXAM:   /72 (BP Location: Right arm, Patient Position: Sitting, Cuff Size: Adult Large)   Pulse 86   Ht 1.587 m (5' 2.48\")   Wt 105.3 kg (232 lb 2.3 oz)   BMI 41.81 kg/m    Orthostatic: lyin/80 80bpm  Sittin/68 110bpm  Standing 126/76 96bpm    Alert and oriented to person, place, and time. No acute distres.   PERRL, EOMI. Face symmetric. Tongue midline.   Strong eye closure and cheek puff.  No pronator drift.   BUE and BLE 5/5 throughout.   Reflexes 2+ throughout.   Sensation intact and symmetric to light touch throughout.   Normal FNF, normal HTS test. Gait is normal.     IMAGES:   c spine imaging reviewed with patient, tonsillar descent ~8mm     ASSESSMENT:  Paula Back, 32 year old female with history of papilledema and acquired chiari malformation, ongoing neurological concerns    PLAN:  - neuro ophthalmology referral, neurology and PM&R referrals placed  -we would like to have her assessed by neuro ophthalmology for ongoing concerns for papilledema, after assessment, we will determine ongoing follow up and need for surgical intervention  - Paula Back and family were counseled to please contact us with any acute worsening of symptoms, or with any questions or concerns.     This patient was discussed with neurosurgery faculty, who agrees with the above.  Frank Mendoza MD on 2023 at 2:32 PM      Attestation signed by Micheal Olivares MD at 2023  9:06 PM:      Physician Attestation  I saw this patient with the resident and agree with the resident/fellow's findings and plan of care as documented in the note.      Key findings:     Was a pleasure seeing Paula in clinic today.  As noted, she is here with reported finding of papilledema and " headaches as described.  It sounds like she has been having the symptoms since 2017, with papilledema reported that time, and even being placed on Diamox.  She also complains of blackout spells.  Her headaches are unusual, and are not classic for Chiari type headaches.  Her MRI scan reveals descent of cerebellar tonsils about 8 mm below the basion of his skin line, with a rounded appearance and no significant crowding.  If she in fact does have intracranial hypertension, this could be a an acquired Chiari malformation.  We would first like for her to be further evaluated by neuro-ophthalmology to address the papilledema and the potential need for treating this.       Micheal Olivares MD  Date of Service (when I saw the patient): 5/23/2023

## 2023-05-23 NOTE — PATIENT INSTRUCTIONS
You met with Pediatric Neurosurgery at the [facility]    [address] Dr., NP      Pediatric Appointment Scheduling and Call Center:   [phone]    Nurse Practitioner  [phone]    Mailing Address  [address] [address]    Street Address   [address] [place] [id number]    Fax Number  [phone]    For urgent matters that cannot wait until the next business day, occur over a holiday and/or weekend, report directly to your nearest ER or you may call [phone] and ask to page the Pediatric Neurosurgery Resident on call.

## 2023-05-23 NOTE — PROGRESS NOTES
Pediatric Neurosurgery Clinic    Dear Dr. French,   Thank you for referring Paula Back to the pediatric neurosurgery clinic at the [facility].   I had the opportunity to meet with Paula Back and parents on May 23, 2023.    As you know, Paula is a 32 year old female referred for findings of papilledema and Chiari I malformation noted on MRI. We have been following her in neurosurgery clinic for the past month. She notes her symptoms originally started in 2017 with papilledema, she was evaluated by neurology who had placed her on Diamox briefly, she was then sent to [facility] who stated she had no papilledema and was discharged. She notes that ovoer the past 1 year, she has had worsening neurological concerns and symptoms. She notes today she has had a worsening of black out spells that occur daily when she stands up or if she goes from lying down to sitting upright, usually happens more to the end of the day. She notes she felt like she almost loss consciousness yesterday, she was kneeling down, stood up and lost vision and felt extreme pressure in her head.     She notes that her headaches have been increasing, posteriorly, left temporal area and behind right eye. She notes behind her right eye she has a pressure headache, the left temporal area feels like a brain freeze and posteriorly is an ache. She notes these are also worse towards the end of the day and worse when she is upright for a while. She notes she always feels better when lying down. She notes that eye and left temporal headaches are worse with coughing/laughing, no worsening posterior pain with valsalva. She notes that she recently had strep, she has an ENT appointment at the end of the month. She notes at that time she had some difficulty swallowing, but otherwise swallows okay.     She notes ongoing mental fogginess, which is significantly worse by the end of the day.     She notes that she  has had to walk with a walker when out of the house, notes she has ongoing fatigue and needs to sit down.   She notes increasing dizziness    [facility] ophthalmology, Dr. Ramos noted at initial visit right eye was grade 2 and left eye was grade 1; she notes that at her last visit, he had noted both eyes were between grade 1-2. She denies any vision changes, she does note that sometimes she gets blurry vision by the end of the day.     No past medical history on file.    No past surgical history on file.    ALLERGIES:  Patient has no known allergies.    Current Outpatient Medications   Medication Sig Dispense Refill     acetaminophen (TYLENOL) 325 MG tablet Take 650 mg by mouth       albuterol (PROAIR HFA, PROVENTIL HFA, VENTOLIN HFA) 108 (90 BASE) MCG/ACT inhaler Inhale 2 puffs into the lungs every 6 hours as needed for shortness of breath / dyspnea or wheezing 1 Inhaler 0     amphetamine-dextroamphetamine (ADDERALL XR) 15 MG 24 hr capsule Take 15 mg by mouth (Patient not taking: Reported on 5/3/2023)       amphetamine-dextroamphetamine (ADDERALL XR) 20 MG 24 hr capsule Take 20 mg by mouth daily       amphetamine-dextroamphetamine (ADDERALL XR) 5 MG 24 hr capsule as directed Orally (Patient not taking: Reported on 5/3/2023)       citalopram (CELEXA) 20 MG tablet Take 1.5 tablets (30 mg) by mouth daily DUE for office appt with Reji before any further refills (Patient not taking: Reported on 3/23/2023) 45 tablet 0     clindamycin (CLINDAMAX) 1 % external gel        cyclobenzaprine (FLEXERIL) 5 MG tablet Take 5-10 mg by mouth (Patient not taking: Reported on 5/3/2023)       diphenhydrAMINE (BENADRYL) 25 MG tablet diphenhydramine 25 mg tablet   1/2-2 tabs by mouth every 6-8 hours if needed for anxiety or insomnia       DULoxetine (CYMBALTA) 30 MG capsule Take 1 capsule by mouth daily       ibuprofen (ADVIL/MOTRIN) 200 MG tablet every 6 hours       Ibuprofen 200 MG capsule Take 400 mg by mouth every 4 hours as  needed for fever. 100 capsule 0     MAGNESIUM PO Take by mouth At Bedtime (Patient not taking: Reported on 3/23/2023)       melatonin 5 MG tablet Take 5 mg by mouth At Bedtime (Patient not taking: Reported on 3/23/2023)       Multiple Vitamin (MULTIVITAMINS PO) Take by mouth daily 1/2 tab (Patient not taking: Reported on 3/23/2023)       ondansetron (ZOFRAN) 4 MG tablet Take 4 mg by mouth       predniSONE (DELTASONE) 20 MG tablet Take 3 tablets (60 mg) by mouth daily (Patient not taking: Reported on 3/23/2023) 15 tablet 0     prochlorperazine (COMPAZINE) 5 MG tablet Take 5 mg by mouth       propranolol (INDERAL) 10 MG tablet Take 10 mg by mouth as needed       Pseudoephedrine-DM-GG-APAP (DAYQUIL LIQUICAPS OR)  (Patient not taking: Reported on 3/23/2023)       RA KRILL  MG CAPS  (Patient not taking: Reported on 3/23/2023)       Semaglutide-Weight Management (WEGOVY) 2.4 MG/0.75ML pen Inject 2.4 mg Subcutaneous       traMADol (ULTRAM) 50 MG tablet Take 1 tablet (50 mg) by mouth every 6 hours as needed for severe pain (Patient not taking: Reported on 3/23/2023) 15 tablet 0     traZODone (DESYREL) 50 MG tablet Take 50 mg by mouth daily       vilazodone (VIIBRYD) 20 MG TABS tablet TAKE 1 TABLET BY MOUTH EVERY MORNING WITH BREAKFAST         Family History   Problem Relation Age of Onset     Diabetes Maternal Grandfather      Cancer Maternal Grandmother         lung      Cancer Maternal Grandfather         father     Alcohol/Drug Father      Alcohol/Drug Mother        Social History     Tobacco Use     Smoking status: Former     Packs/day: 0.25     Types: Cigarettes     Smokeless tobacco: Never     Tobacco comments:     she is not exposed to tobacco at home   Vaping Use     Vaping status: Not on file   Substance Use Topics     Alcohol use: Yes     Comment: occas.  10 drinks through the week         PHYSICAL EXAM:   /72 (BP Location: Right arm, Patient Position: Sitting, Cuff Size: Adult Large)   Pulse 86    "Ht 1.587 m (5' 2.48\")   Wt 105.3 kg (232 lb 2.3 oz)   BMI 41.81 kg/m    Orthostatic: lyin/80 80bpm  Sittin/68 110bpm  Standing 126/76 96bpm    Alert and oriented to person, place, and time. No acute distres.   PERRL, EOMI. Face symmetric. Tongue midline.   Strong eye closure and cheek puff.  No pronator drift.   BUE and BLE 5/5 throughout.   Reflexes 2+ throughout.   Sensation intact and symmetric to light touch throughout.   Normal FNF, normal HTS test. Gait is normal.     IMAGES:   c spine imaging reviewed with patient, tonsillar descent ~8mm     ASSESSMENT:  Paula Back, 32 year old female with history of papilledema and acquired chiari malformation, ongoing neurological concerns    PLAN:  - neuro ophthalmology referral, neurology and PM&R referrals placed  -we would like to have her assessed by neuro ophthalmology for ongoing concerns for papilledema, after assessment, we will determine ongoing follow up and need for surgical intervention  - Paula Back and family were counseled to please contact us with any acute worsening of symptoms, or with any questions or concerns.     This patient was discussed with neurosurgery faculty, who agrees with the above.  Frank Mendoza MD on 2023 at 2:32 PM    "

## 2023-05-23 NOTE — NURSING NOTE
"Chief Complaint   Patient presents with     RECHECK     Chiari. Left side weakness. neck pain. Brain fog. Headache/head pressure. Difficulty swallowing. 'looking for next option to provide relief'        Vitals:    05/23/23 1446   BP: 104/72   BP Location: Right arm   Patient Position: Sitting   Cuff Size: Adult Large   Pulse: 86   Weight: 232 lb 2.3 oz (105.3 kg)   Height: 5' 2.48\" (158.7 cm)       Patient MyChart Active? Yes  If no, would they like to sign up? N/A    Has patient signed a Consent to Communicate form to discuss health information with guardians if applicable? Does not apply     Does patient need PHQ-2 completed today? No      Dale Frazier  May 23, 2023  "

## 2023-05-24 ENCOUNTER — TELEPHONE (OUTPATIENT)
Dept: OPHTHALMOLOGY | Facility: CLINIC | Age: 33
End: 2023-05-24
Payer: COMMERCIAL

## 2023-05-24 NOTE — TELEPHONE ENCOUNTER
[facility] Call Center    Phone Message    May a detailed message be left on voicemail: yes     Reason for Call: Appointment Intake    Referring Provider Name: Misa Andrews NP in [facility] P[facility]S NEUROSURGERY  Diagnosis and/or Symptoms: Papilledema [H47.10]  Chiari malformation type I (H) [G93.5]    Sending TE per GL's.  Pt would like to be seen in [place] but will go to another clinic if the availability is sooner.  Please review.    Action Taken: Message routed to:  [facility] eye    Travel Screening: Not Applicable

## 2023-05-24 NOTE — TELEPHONE ENCOUNTER
Ok for 5- at 0815 with Dr. Espinal per neuro-ophthalmology team at [facility] location    Scheduled pt to hold time slot    Note to  to reach out to review date/time/location/duration/parking/main clinic number.  [employer] insurance    Jose Ramírez RN 3:42 PM 05/24/23

## 2023-05-25 ENCOUNTER — TELEPHONE (OUTPATIENT)
Dept: OPHTHALMOLOGY | Facility: CLINIC | Age: 33
End: 2023-05-25
Payer: COMMERCIAL

## 2023-05-25 NOTE — PROGRESS NOTES
"ENT Consultation    Paula Back who is a 32 year old female seen in consultation at the request of ***.      History of Present Illness - Paula Back is a 32 year old female ***      There is no height or weight on file to calculate BMI.    {Weight Management Plan -- Delete if patient has a normal BMI:160741}    BP Readings from Last 1 Encounters:   23 104/72       {htnspecialty:805697}    Paula {IS:606960} a smoker/uses chewing tobacco.  Paula {QUITTIN::\"is not ready to quit\"}      Past Medical History - No past medical history on file.    Current Medications -   Current Outpatient Medications:      acetaminophen (TYLENOL) 325 MG tablet, Take 650 mg by mouth, Disp: , Rfl:      albuterol (PROAIR HFA, PROVENTIL HFA, VENTOLIN HFA) 108 (90 BASE) MCG/ACT inhaler, Inhale 2 puffs into the lungs every 6 hours as needed for shortness of breath / dyspnea or wheezing, Disp: 1 Inhaler, Rfl: 0     amoxicillin (AMOXIL) 500 MG capsule, Take 500 mg by mouth 2 times daily, Disp: , Rfl:      amphetamine-dextroamphetamine (ADDERALL XR) 15 MG 24 hr capsule, Take 15 mg by mouth (Patient not taking: Reported on 5/3/2023), Disp: , Rfl:      amphetamine-dextroamphetamine (ADDERALL XR) 20 MG 24 hr capsule, Take 20 mg by mouth daily, Disp: , Rfl:      amphetamine-dextroamphetamine (ADDERALL XR) 5 MG 24 hr capsule, as directed Orally (Patient not taking: Reported on 5/3/2023), Disp: , Rfl:      citalopram (CELEXA) 20 MG tablet, Take 1.5 tablets (30 mg) by mouth daily DUE for office appt with Reji before any further refills (Patient not taking: Reported on 3/23/2023), Disp: 45 tablet, Rfl: 0     clindamycin (CLINDAMAX) 1 % external gel, , Disp: , Rfl:      cyclobenzaprine (FLEXERIL) 5 MG tablet, Take 5-10 mg by mouth (Patient not taking: Reported on 5/3/2023), Disp: , Rfl:      diphenhydrAMINE (BENADRYL) 25 MG tablet, diphenhydramine 25 mg tablet  1/2-2 tabs by mouth every 6-8 hours if needed for anxiety or " insomnia, Disp: , Rfl:      DULoxetine (CYMBALTA) 30 MG capsule, Take 1 capsule by mouth daily, Disp: , Rfl:      ibuprofen (ADVIL/MOTRIN) 200 MG tablet, every 6 hours, Disp: , Rfl:      Ibuprofen 200 MG capsule, Take 400 mg by mouth every 4 hours as needed for fever., Disp: 100 capsule, Rfl: 0     Levomefolate Glucosamine (METHYL-FOLATE PO), Take 7.5 mg by mouth daily, Disp: , Rfl:      MAGNESIUM PO, Take by mouth At Bedtime (Patient not taking: Reported on 3/23/2023), Disp: , Rfl:      melatonin 5 MG tablet, Take 5 mg by mouth At Bedtime (Patient not taking: Reported on 3/23/2023), Disp: , Rfl:      Multiple Vitamin (MULTIVITAMINS PO), Take by mouth daily 1/2 tab (Patient not taking: Reported on 3/23/2023), Disp: , Rfl:      ondansetron (ZOFRAN) 4 MG tablet, Take 4 mg by mouth (Patient not taking: Reported on 5/23/2023), Disp: , Rfl:      predniSONE (DELTASONE) 20 MG tablet, Take 3 tablets (60 mg) by mouth daily (Patient not taking: Reported on 3/23/2023), Disp: 15 tablet, Rfl: 0     prochlorperazine (COMPAZINE) 5 MG tablet, Take 5 mg by mouth, Disp: , Rfl:      propranolol (INDERAL) 10 MG tablet, Take 10 mg by mouth as needed, Disp: , Rfl:      Pseudoephedrine-DM-GG-APAP (DAYQUIL LIQUICAPS OR), , Disp: , Rfl:      RA KRILL  MG CAPS, , Disp: , Rfl:      Semaglutide-Weight Management (WEGOVY) 2.4 MG/0.75ML pen, Inject 2.4 mg Subcutaneous, Disp: , Rfl:      traMADol (ULTRAM) 50 MG tablet, Take 1 tablet (50 mg) by mouth every 6 hours as needed for severe pain (Patient not taking: Reported on 3/23/2023), Disp: 15 tablet, Rfl: 0     traZODone (DESYREL) 50 MG tablet, Take 50 mg by mouth daily, Disp: , Rfl:      vilazodone (VIIBRYD) 20 MG TABS tablet, TAKE 1 TABLET BY MOUTH EVERY MORNING WITH BREAKFAST, Disp: , Rfl:     Allergies - No Known Allergies    Social History -   Social History     Socioeconomic History     Marital status:    Tobacco Use     Smoking status: Former     Packs/day: 0.25     Types:  Cigarettes     Smokeless tobacco: Never     Tobacco comments:     she is not exposed to tobacco at home   Substance and Sexual Activity     Alcohol use: Yes     Comment: occas.  10 drinks through the week     Drug use: No     Sexual activity: Yes     Partners: Male     Birth control/protection: Condom, Injection   Other Topics Concern     Parent/sibling w/ CABG, MI or angioplasty before 65F 55M? No   Social History Narrative    Works at [employer] on 4:30-1 AM shift       Family History -   Family History   Problem Relation Age of Onset     Diabetes Maternal Grandfather      Cancer Maternal Grandmother         lung      Cancer Maternal Grandfather         father     Alcohol/Drug Father      Alcohol/Drug Mother        Review of Systems - As per HPI and PMHx, otherwise review of system review of the head and neck negative. Otherwise 10+ review of system is negative    Physical Exam  There were no vitals taken for this visit.  BMI: There is no height or weight on file to calculate BMI.    General - The patient is well nourished and well developed, and appears to have good nutritional status.  Alert and oriented to person and place, answers questions and cooperates with examination appropriately.    SKIN - No suspicious lesions or rashes.  Respiration - No respiratory distress.  Head and Face - Normocephalic and atraumatic, with no gross asymmetry noted of the contour of the facial features.  The facial nerve is intact, with strong symmetric movements.    Voice and Breathing - The patient was breathing comfortably without the use of accessory muscles. The patients voice was clear and strong, and had appropriate pitch and quality.    Ears - Bilateral pinna and EACs with normal appearing overlying skin. Tympanic membrane intact with good mobility on pneumatic otoscopy bilaterally. Bony landmarks of the ossicular chain are normal. The tympanic membranes are normal in appearance. No retraction, perforation, or  masses.  No fluid or purulence was seen in the external canal or the middle ear.     Eyes - Extraocular movements intact.  Sclera were not icteric or injected, conjunctiva were pink and moist.    Mouth - Examination of the oral cavity showed pink, healthy oral mucosa. No lesions or ulcerations noted.  The tongue was mobile and midline, and the dentition were in good condition.      Throat - The walls of the oropharynx were smooth, pink, moist, symmetric, and had no lesions or ulcerations.  The tonsillar pillars and soft palate were symmetric.  The uvula was midline on elevation.    Neck - Normal midline excursion of the laryngotracheal complex during swallowing.  Full range of motion on passive movement.  Palpation of the occipital, submental, submandibular, internal jugular chain, and supraclavicular nodes did not demonstrate any abnormal lymph nodes or masses.  The carotid pulse was palpable bilaterally.  Palpation of the thyroid was soft and smooth, with no nodules or goiter appreciated.  The trachea was mobile and midline.    Nose - External contour is symmetric, no gross deflection or scars.  Nasal mucosa is pink and moist with no abnormal mucus.  The septum was midline and non-obstructive, turbinates of normal size and position.  No polyps, masses, or purulence noted on examination.    Neuro - Nonfocal neuro exam is normal, CN 2 through 12 intact, normal gait and muscle tone.      Performed in clinic today:  [facility]      A/P - Paula Back is a 32 year old female ***      Jason Cavazos MD     [employer] Preamble Text (Leave Blank If You Do Not Want): Undermining was performed with blunt dissection.

## 2023-05-29 NOTE — PROGRESS NOTES
Paula Back is a 32 year old female with the following diagnoses:   1. Chiari malformation type I (H)    2. Visual field defect         Patient was sent for consultation by Dr. Andrews for papilledema evaluation.    HPI:    Paula Back is a 32 year old female with a PMH of morbid obesity, anxiety, and depression presenting for evaluation of papilledema.  She had been having transient visual obscurations since she was a child.  It was initially intermittent but has become more constant.  In the last month she has been having headaches daily, prior to that was a couple times a week. She occasonally will have headaches induced with coughing.  MRI was done and noted to have Chiari malformation extending 1 centimeters below the foramen magnum.  Neurosurgery is hesitant about performing foramen magnum decompression as they think it is IIH causing the cerebellar ectopia.  Patient denies pulse synchronous tinnitus .  Denies diplopia.  Patient has not had lumbar puncture.        Independent historians:  Patient    Review of outside testing:    CTV head neck 5/23/23      MRI brain wwo 3/20/23      My interpretation performed today of outside testing:  I have independently reviewed MRI brain wwo performed 3/20/23. No abnormal FLAIR hyperintensity or enhancement in the orbits or elsewhere along the visual pathways.  She does have widening of the perioptic spaces but no flattening of the posterior globes and no partially empty sella.         Review of outside clinical notes:    -- Visit with Dr. Conner Mendoza 5/23/23         Visit at [facility] 3/15/23        Past medical history:    Patient Active Problem List   Diagnosis     Contraceptive management     Acne     Family hx of alcoholism     Papanicolaou smear of cervix with low grade squamous intraepithelial lesion (LGSIL)     Advanced directives, counseling/discussion     Insomnia     CARDIOVASCULAR SCREENING; LDL GOAL LESS THAN 160     SOB (shortness  of breath)     24 hour contact handout given     Obese     Moderate major depression (H)     Generalized anxiety disorder     Morbid obesity (H)         Medications:   acetaminophen  albuterol  amoxicillin  amphetamine-dextroamphetamine  citalopram  clindamycin  cyclobenzaprine  DAYQUIL LIQUICAPS OR  diphenhydrAMINE  DULoxetine  ibuprofen  MAGNESIUM PO  melatonin  METHYL-FOLATE PO  MULTIVITAMINS PO  ondansetron  predniSONE  prochlorperazine  propranolol  RA Krill Oil Caps  traMADol  traZODone  vilazodone Tabs  Mariano Pompa    has a current medication list which includes the following prescription(s): acetaminophen, albuterol, amoxicillin, amphetamine-dextroamphetamine, amphetamine-dextroamphetamine, amphetamine-dextroamphetamine, citalopram, clindamycin, cyclobenzaprine, diphenhydramine, duloxetine, ibuprofen, ibuprofen, levomefolate glucosamine, magnesium, melatonin, multiple vitamin, ondansetron, prednisone, prochlorperazine, propranolol, pseudoephedrine-dm-gg-apap, ra krill oil, wegovy, tramadol, trazodone, and vilazodone.    Family history / social history:  Patient's family history includes Alcohol/Drug in her father and mother; Cancer in her maternal grandfather and maternal grandmother; Diabetes in her maternal grandfather.     Patient  reports that she has quit smoking. Her smoking use included cigarettes. She smoked an average of .25 packs per day. She has never used smokeless tobacco. She reports current alcohol use. She reports that she does not use drugs.       Exam:  Visual acuity 20/20 right eye 20/20 left eye.  Color vision 11/11 right eye and 11/11 left eye.  Pupils normal with no afferent pupillary defect.  Intraocular pressure 14 right eye and 14 left eye.  Anterior segment exam unremarkable.  Fundus exam with no optic disc edema and positive spontaneous venous pulsations.  Strabismus exam     Tests ordered and interpreted today:  Visual field   OCT         Discussion of management / interpretation  with another provider:   None    Assessment/Plan:   It is my impression that patient has no evidence of disc edema in either eye.  Her outside optometrist felt she had disc edema, but this seems unlikely as review of their OCT does not show significant thickening in either eye and stable compared to OCT today. She also has the presence of spontaneous venous pulsations today arguing against elevated intracranial pressure.  I think it is very unlikely that she has IIH.  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  I am always happy to see Paula back for new concerns but I did not make a follow up appointment for her today.            Attending Physician Attestation:  Complete documentation of historical and exam elements from today's encounter can be found in the full encounter summary report (not reduplicated in this progress note).  I personally obtained the chief complaint(s) and history of present illness.  I confirmed and edited as necessary the review of systems, past medical/surgical history, family history, social history, and examination findings as documented by others; and I examined the patient myself.  I personally reviewed the relevant tests, images, and reports as documented above.  I formulated and edited as necessary the assessment and plan and discussed the findings and management plan with the patient and family. I personally reviewed the ophthalmic test(s) associated with this encounter, agree with the interpretation(s) as documented by the resident/fellow, and have edited the corresponding report(s) as necessary.  - Tarun Limon MD  Ophthalmology Resident, PGY-3  [facility]

## 2023-05-30 ENCOUNTER — OFFICE VISIT (OUTPATIENT)
Dept: OPHTHALMOLOGY | Facility: CLINIC | Age: 33
End: 2023-05-30
Attending: OPHTHALMOLOGY
Payer: COMMERCIAL

## 2023-05-30 DIAGNOSIS — H53.40 VISUAL FIELD DEFECT: ICD-10-CM

## 2023-05-30 DIAGNOSIS — G93.5 CHIARI MALFORMATION TYPE I (H): Primary | ICD-10-CM

## 2023-05-30 DIAGNOSIS — H53.40 VISUAL FIELD DEFECT: Primary | ICD-10-CM

## 2023-05-30 PROCEDURE — 99204 OFFICE O/P NEW MOD 45 MIN: CPT | Mod: GC | Performed by: OPHTHALMOLOGY

## 2023-05-30 PROCEDURE — 99214 OFFICE O/P EST MOD 30 MIN: CPT | Performed by: OPHTHALMOLOGY

## 2023-05-30 PROCEDURE — 92083 EXTENDED VISUAL FIELD XM: CPT | Performed by: OPHTHALMOLOGY

## 2023-05-30 PROCEDURE — 92133 CPTRZD OPH DX IMG PST SGM ON: CPT | Performed by: OPHTHALMOLOGY

## 2023-05-30 ASSESSMENT — CONF VISUAL FIELD
OD_SUPERIOR_NASAL_RESTRICTION: 0
OD_INFERIOR_NASAL_RESTRICTION: 0
OD_SUPERIOR_TEMPORAL_RESTRICTION: 0
OS_SUPERIOR_TEMPORAL_RESTRICTION: 0
OS_INFERIOR_NASAL_RESTRICTION: 0
OS_SUPERIOR_NASAL_RESTRICTION: 0
OD_NORMAL: 1
OS_INFERIOR_TEMPORAL_RESTRICTION: 0
OD_INFERIOR_TEMPORAL_RESTRICTION: 0
OS_NORMAL: 1

## 2023-05-30 ASSESSMENT — CUP TO DISC RATIO
OD_RATIO: 0.4
OS_RATIO: 0.4

## 2023-05-30 ASSESSMENT — TONOMETRY
IOP_METHOD: ICARE
OS_IOP_MMHG: 14
OD_IOP_MMHG: 14

## 2023-05-30 ASSESSMENT — SLIT LAMP EXAM - LIDS
COMMENTS: NORMAL
COMMENTS: NORMAL

## 2023-05-30 ASSESSMENT — VISUAL ACUITY
METHOD: SNELLEN - LINEAR
OS_SC: 20/20
OD_SC: 20/20

## 2023-05-30 ASSESSMENT — EXTERNAL EXAM - LEFT EYE: OS_EXAM: NORMAL

## 2023-05-30 ASSESSMENT — EXTERNAL EXAM - RIGHT EYE: OD_EXAM: NORMAL

## 2023-05-30 NOTE — LETTER
2023         RE:  :  MRN: Paula Back  1990  [id number]     Dear Dr. Misa Andrews,    Thank you for asking me to see your very pleasant patient, Paula Back, in neuro-ophthalmic consultation.  I would like to thank you for sending your records and I have summarized them in the history of present illness.  My assessment and plan are below.  For further details, please see my attached clinic note.      Paula Back is a 32 year old female with the following diagnoses:   1. Chiari malformation type I (H)    2. Visual field defect         Patient was sent for consultation by Dr. Andrews for papilledema evaluation.    HPI:    Paula Back is a 32 year old female with a PMH of morbid obesity, anxiety, and depression presenting for evaluation of papilledema.  She had been having transient visual obscurations since she was a child.  It was initially intermittent but has become more constant.  In the last month she has been having headaches daily, prior to that was a couple times a week. She occasonally will have headaches induced with coughing.  MRI was done and noted to have Chiari malformation extending 1 centimeters below the foramen magnum.  Neurosurgery is hesitant about performing foramen magnum decompression as they think it is IIH causing the cerebellar ectopia.  Patient denies pulse synchronous tinnitus .  Denies diplopia.  Patient has not had lumbar puncture.        Independent historians:  Patient    Review of outside testing:    CTV head neck 23      MRI brain wwo 3/20/23      My interpretation performed today of outside testing:  I have independently reviewed MRI brain wwo performed 3/20/23. No abnormal FLAIR hyperintensity or enhancement in the orbits or elsewhere along the visual pathways.  She does have widening of the perioptic spaces but no flattening of the posterior globes and no partially empty sella.         Review of outside clinical notes:    -- Visit with  Dr. Conner Mendoza 5/23/23         Visit at [facility] 3/15/23        Past medical history:    Patient Active Problem List   Diagnosis     Contraceptive management     Acne     Family hx of alcoholism     Papanicolaou smear of cervix with low grade squamous intraepithelial lesion (LGSIL)     Advanced directives, counseling/discussion     Insomnia     CARDIOVASCULAR SCREENING; LDL GOAL LESS THAN 160     SOB (shortness of breath)     24 hour contact handout given     Obese     Moderate major depression (H)     Generalized anxiety disorder     Morbid obesity (H)         Medications:   acetaminophen  albuterol  amoxicillin  amphetamine-dextroamphetamine  citalopram  clindamycin  cyclobenzaprine  DAYQUIL LIQUICAPS OR  diphenhydrAMINE  DULoxetine  ibuprofen  MAGNESIUM PO  melatonin  METHYL-FOLATE PO  MULTIVITAMINS PO  ondansetron  predniSONE  prochlorperazine  propranolol  RA Krill Oil Caps  traMADol  traZODone  vilazodone Tabs  Weamybarbara Pompa    has a current medication list which includes the following prescription(s): acetaminophen, albuterol, amoxicillin, amphetamine-dextroamphetamine, amphetamine-dextroamphetamine, amphetamine-dextroamphetamine, citalopram, clindamycin, cyclobenzaprine, diphenhydramine, duloxetine, ibuprofen, ibuprofen, levomefolate glucosamine, magnesium, melatonin, multiple vitamin, ondansetron, prednisone, prochlorperazine, propranolol, pseudoephedrine-dm-gg-apap, ra krill oil, wegovy, tramadol, trazodone, and vilazodone.    Family history / social history:  Patient's family history includes Alcohol/Drug in her father and mother; Cancer in her maternal grandfather and maternal grandmother; Diabetes in her maternal grandfather.     Patient  reports that she has quit smoking. Her smoking use included cigarettes. She smoked an average of .25 packs per day. She has never used smokeless tobacco. She reports current alcohol use. She reports that she does not use drugs.       Exam:  Visual acuity  20/20 right eye 20/20 left eye.  Color vision 11/11 right eye and 11/11 left eye.  Pupils normal with no afferent pupillary defect.  Intraocular pressure 14 right eye and 14 left eye.  Anterior segment exam unremarkable.  Fundus exam with no optic disc edema and positive spontaneous venous pulsations.  Strabismus exam     Tests ordered and interpreted today:  Visual field   OCT         Discussion of management / interpretation with another provider:   None    Assessment/Plan:   It is my impression that patient has no evidence of disc edema in either eye.  Her outside optometrist felt she had disc edema, but this seems unlikely as review of their OCT does not show significant thickening in either eye and stable compared to OCT today. She also has the presence of spontaneous venous pulsations today arguing against elevated intracranial pressure.  I think it is very unlikely that she has IIH.  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  I am always happy to see Paula back for new concerns but I did not make a follow up appointment for her today.          Again, thank you for allowing me to participate in the care of your patient.      Sincerely,    Tarun Espinal MD  Professor  Ophthalmology Residency   Director of Neuro-Ophthalmology  [employer] - [employer] Endowed Chair  Departments of Ophthalmology, Neurology, and Neurosurgery  [address]  [address]  T - [phone]  F - [phone]  KELSY [email or web address]      CC: Misa Andrews NP  [facility]  [address] [facility] [address]  Via In Basket

## 2023-05-30 NOTE — LETTER
2023         RE:  :  MRN: Paula Back  1990  [id number]     Dear Dr. Andrews,    Thank you for asking me to see your very pleasant patient, Paula Back, in neuro-ophthalmic consultation.  I would like to thank you for sending your records and I have summarized them in the history of present illness. My assessment and plan are below.  For further details, please see my attached clinic note.      Paula Back is a 32 year old female with the following diagnoses:   1. Chiari malformation type I (H)    2. Visual field defect       Patient was sent for consultation by Dr. Andrews for papilledema evaluation.    HPI: Paula Back is a 32 year old female with a PMH of morbid obesity, anxiety, and depression presenting for evaluation of papilledema.  She had been having transient visual obscurations since she was a child.  It was initially intermittent but has become more constant.  In the last month she has been having headaches daily, prior to that was a couple times a week. She occasonally will have headaches induced with coughing.  MRI was done and noted to have Chiari malformation extending 1 centimeters below the foramen magnum.  Neurosurgery is hesitant about performing foramen magnum decompression as they think it is IIH causing the cerebellar ectopia.  Patient denies pulse synchronous tinnitus .  Denies diplopia.  Patient has not had lumbar puncture.      Independent historians: Patient    Review of outside testing:  CTV head neck 23      MRI brain wwo 3/20/23      My interpretation performed today of outside testing: I have independently reviewed MRI brain wwo performed 3/20/23. No abnormal FLAIR hyperintensity or enhancement in the orbits or elsewhere along the visual pathways.  She does have widening of the perioptic spaces but no flattening of the posterior globes and no partially empty sella.     Review of outside clinical notes:  -- Visit with Dr. Conner Mendoza  5/23/23         Visit at [facility] 3/15/23        Past medical history:   Contraceptive management    Acne    Family hx of alcoholism    Papanicolaou smear of cervix with low grade squamous intraepithelial lesion (LGSIL)    Advanced directives, counseling/discussion    Insomnia    CARDIOVASCULAR SCREENING; LDL GOAL LESS THAN 160    SOB (shortness of breath)    24 hour contact handout given    Obese    Moderate major depression (H)    Generalized anxiety disorder    Morbid obesity (H)     Medications:   acetaminophen  albuterol  amoxicillin  amphetamine-dextroamphetamine  citalopram  clindamycin  cyclobenzaprine  DAYQUIL LIQUICAPS OR  diphenhydrAMINE  DULoxetine  ibuprofen  MAGNESIUM PO  melatonin  METHYL-FOLATE PO  MULTIVITAMINS PO  ondansetron  predniSONE  prochlorperazine  propranolol  RA Krill Oil Caps  traMADol  traZODone  vilazodone Tabs  Wegovy Soaj    has a current medication list which includes the following prescription(s): acetaminophen, albuterol, amoxicillin, amphetamine-dextroamphetamine, amphetamine-dextroamphetamine, amphetamine-dextroamphetamine, citalopram, clindamycin, cyclobenzaprine, diphenhydramine, duloxetine, ibuprofen, ibuprofen, levomefolate glucosamine, magnesium, melatonin, multiple vitamin, ondansetron, prednisone, prochlorperazine, propranolol, pseudoephedrine-dm-gg-apap, ra krill oil, wegovy, tramadol, trazodone, and vilazodone.    Family history / social history: Patient's family history includes Alcohol/Drug in her father and mother; Cancer in her maternal grandfather and maternal grandmother; Diabetes in her maternal grandfather.     Patient  reports that she has quit smoking. Her smoking use included cigarettes. She smoked an average of .25 packs per day. She has never used smokeless tobacco. She reports current alcohol use. She reports that she does not use drugs.     Exam: Visual acuity 20/20 right eye 20/20 left eye.  Color vision 11/11 right eye and 11/11 left eye.   Pupils normal with no afferent pupillary defect.  Intraocular pressure 14 right eye and 14 left eye.  Anterior segment exam unremarkable.  Fundus exam with no optic disc edema and positive spontaneous venous pulsations.  Strabismus exam     Tests ordered and interpreted today: Visual field, OCT     Discussion of management / interpretation with another provider:  None    Assessment/Plan: It is my impression that patient has no evidence of disc edema in either eye.  Her outside optometrist felt she had disc edema, but this seems unlikely as review of their OCT does not show significant thickening in either eye and stable compared to OCT today. She also has the presence of spontaneous venous pulsations today arguing against elevated intracranial pressure.  I think it is very unlikely that she has IIH.  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  I am always happy to see Paula back for new concerns but I did not make a follow up appointment for her today.      Again, thank you for allowing me to participate in the care of your patient.      Sincerely,    Tarun Espinal MD  Professor  Ophthalmology Residency   Director of Neuro-Ophthalmology  [employer] - [employer] Endowed Chair  Departments of Ophthalmology, Neurology, and Neurosurgery  [facility] [address] [facility] [address], [place]  [address]  T - [phone]  F - [phone]  KELSY [email or web address]      CC: Misa Andrews NP  Pediatric [facility]ty Care  [address] [facility] [address]  Via In Basket     Micheal Olivares MD  [address]  [facility] [address]  Via In Basket     Tamara Barry MD  [address]  [facility] [address]  Via Fax: [phone]

## 2023-05-30 NOTE — NURSING NOTE
Chief Complaints and History of Present Illnesses   Patient presents with     Papilledema Evaluation     Chief Complaint(s) and History of Present Illness(es)     Papilledema Evaluation            Laterality: both eyes          Comments    Pt. States that she has history of papilledema in 2107. Started having episodes of blacking out last year. Still happening every day. Has been feeling a lot of pressure in head-especially behind RE. Has been having episodes of sparkles in vision. VA is good most of the time but does have episodes of vision blurring-like looking through a fishbowl. This lasts about 1 minute.  Marie Saleem COT 8:36 AM May 30, 2023

## 2023-05-31 ENCOUNTER — OFFICE VISIT (OUTPATIENT)
Dept: OTOLARYNGOLOGY | Facility: OTHER | Age: 33
End: 2023-05-31
Attending: EMERGENCY MEDICINE
Payer: COMMERCIAL

## 2023-05-31 VITALS
HEIGHT: 62 IN | SYSTOLIC BLOOD PRESSURE: 102 MMHG | DIASTOLIC BLOOD PRESSURE: 60 MMHG | TEMPERATURE: 98.5 F | BODY MASS INDEX: 43.02 KG/M2 | WEIGHT: 233.8 LBS

## 2023-05-31 DIAGNOSIS — M26.609 TMJ (TEMPOROMANDIBULAR JOINT SYNDROME): ICD-10-CM

## 2023-05-31 DIAGNOSIS — M54.2 NECK PAIN: ICD-10-CM

## 2023-05-31 DIAGNOSIS — K21.9 LPRD (LARYNGOPHARYNGEAL REFLUX DISEASE): Primary | ICD-10-CM

## 2023-05-31 PROCEDURE — 99213 OFFICE O/P EST LOW 20 MIN: CPT | Mod: 25 | Performed by: OTOLARYNGOLOGY

## 2023-05-31 PROCEDURE — 31575 DIAGNOSTIC LARYNGOSCOPY: CPT | Performed by: OTOLARYNGOLOGY

## 2023-05-31 RX ORDER — OMEPRAZOLE 40 MG/1
40 CAPSULE, DELAYED RELEASE ORAL DAILY
Qty: 30 CAPSULE | Refills: 3 | Status: SHIPPED | OUTPATIENT
Start: 2023-05-31 | End: 2024-04-01

## 2023-05-31 NOTE — PROGRESS NOTES
"In person evaluation      HPI  6/2/2023, in person consultation        32-year-old evaluated neurologically for:  Headaches  Pseudotumor cerebri question borderline  Arnold-Chiari malformation type I  Paresthesias  Chronic headache      Patient tells me that for at least a year she has been having difficulty  Palpitations neck pain headaches  She had chiropractic manipulation in 2023 and things got a lot worse    Pressure in her head was a lot worse it was every day it was more on the right side behind her eye could go down to the jaw sometimes to be on the left side more of a burning sensation sometimes it would be at the back of the head at the base of the neck    Worse with standing or sitting without her head supported  Worse bending over    She says she did feel like she is going to blackout and her vision would go blurry sometimes that was worse at the end of the day  This sounds almost like an obscuration    Back in 2016 she was seen at the [facility] they never did an LP her papilledema did not look bad to them they did not think she had pseudotumor                Onset of symptoms mid February 2023 after chiropractic manipulation the neck.  Next day started having pain in her esophagus  Some difficulty with swallowing felt like she was being choked  Seen in the ER for dysphagia on 2/24/2023  Has been evaluated by ENT (swallow eval with speech pathology)  Barium swallow with significant gastroesophageal reflux, ENT felt there may have been some soft tissue injury from chiropractic manipulation that aggravated some of her swallowing difficulty  Developed headaches      Patient also had difficulty with \"dizziness\" and off-balance sensation for 6-9 months worse after chiropractic manipulation February 2023  He had head pressure/flushing/graying out of vision for a minute and makes her feel like she is deaf due to a head rush.    Patient with a pressure-like pain over the right eye and behind the eye  Also has " "a burning sensation over the left eye radiating to the top of the head    Headaches are daily  Last about 10 minutes    Currently on Midol complete daily    Shifting positions helps improve them  Coughing and some positions will make it worse  There is some discomfort in the midline neck  Has numbness feeling the sensation being pulled down over her shoulders    She also notes a mental fogginess    She does have some night sweats and temperature dysregulation  Some difficulty with swallowing    She initially was told by ophthalmology that she had level 1-2 papilledema both eyes  ([facility] [place] [facility] specialist evaluated 3/15/2023 felt that patient had optic nerve edema OD greater than OS      A.  Pseudotumor cerebri 2016       Had optic nerve swelling       \"Symptoms resolved after 70 pound weight loss\" 2017       \"Had incidental papilledema on eye exam\"        Saw [facility] they never did a spinal tap as her eyegrounds looked okay        Had some headaches but they were not so bad      B.  Arnold-Chiari malformation        MRI March 2023 8 mm cerebellar ectopia        Evaluated by neurosurgery several times       Initial 3/15/2023 question optic nerve edema OD greater than OS       Neuro-ophthalmology eval, RNFL 5/30/2023 normal bilaterally    C.  Paresthesias        More left-sided        B12 321, (2021)    D.  Under a lot of stress/anxiety        Maternal aunt who had pseudotumor cerebri recently killed herself May 2023        Increase stressors    E.   Significant GERD               Past medical history  Pseudotumor cerebri in 2016 (resolved with 70 pound weight loss)  Arnold-Chiari malformation  Pelvic pain  Low-grade squamous intraepithelial lesion of the cervix  PTSD  Binge eating disorder  Depression/anxiety disorder  ADHD      Habits  Past smoker quarter pack of cigarettes per day  Alcohol 10/week  History of smoking some marijuana    Family history  Mother alcohol use/bipolar  Father alcohol use  Maternal " grandfather liver cancer/diabetes  Maternal grandmother lung cancer  Paternal grandfather testicular cancer  Paternal grandmother heart disease/stroke  Brother anxiety/depression  Sister anxiety/depression        Work-up  MRI cervical spine 1/11/2023  C4-C5 moderate-severe left foraminal stenosis  C5-C6 moderate left foraminal stenosis, mild canal stenosis  1.  Unremarkable cervical cord.   2.  No high-grade spinal canal stenosis.   3.  At C4-C5, a left lateral/foraminal protrusion contributes to moderate-to-severe left neural foraminal stenosis.   4.  At C5-C6, there is moderate left neural foraminal stenosis.   5.  Additional degenerative changes see official report  6.  Cerebellar tonsillar ectopia 5-6 mm.  HEAD CTA: 2/24/2023  1.  Patent intracranial arterial vasculature without flow-limiting stenosis.   2.  No aneurysm.   NECK CTA: 2/24/2023  1.  Patent cervical arterial vasculature without flow-limiting stenosis.   2.  No evidence of dissection.  Video swallow and barium swallow March 2023                                               IMPRESSION:  1. Normal swallow study.  2. Minimal dysmotility of the esophagus with mild escape from the  primary stripping wave which is cleared by secondary stripping waves  on one of the swallows. The other swallows demonstrated no escape from  the primary stripping wave.  3. Gastroesophageal reflux to the level of the clavicles was elicited  with provocative maneuvers.  4. No other abnormalities are identified. I discussed these findings  with the patient at time of exam.  5. Please see also speech pathology report for additional information  on the swallow portion of the study.    MRA head 3/19/2023  1.  Normal exam  MRA cervical vessels without and with contrast 3/19/2023  1.  Normal exam  MRI head with and without contrast 3/19/2023  1. No acute intracranial abnormality evident.   2. No intracranial mass or mass effect.       No abnormal intracranial enhancement. No focal  parenchymal signal abnormality.   3. Cerebellar tonsillar ectopia with rounded tonsils protruding 8 mm beyond the foramen magnum unchanged compared to prior examination.       No hydrocephalus.       No additional findings to suggest intracranial hypotension.    MRI head 4/24/2023  1.  Redemonstrated cerebellar tonsillar ectopia measuring up to 1 cm below the foramen magnum.        This is again in a manner compatible with a Chiari I malformation.        CSF flow study demonstrates abundant flow ventral to the cervicomedullary junction,        with additionally preserved flow across the foramen of Magendie.        There is only a very small amount of flow dorsal to the cerebellar tonsils.  CTV head neck with contrast 5/23/2023  Patent dural venous sinuses and major deep intracranial veins.  RNFL 5/30/2023 normal bilaterally      Exam    Review of systems  Neck and back pain some arm pain bilaterally  Numbness worse on the left side  Weakness worse on the left arm and leg with some difficulty walking  Poor balance  Has some bladder difficulties    Speech and swallow difficulties    Has occasional changes in her hearing and chronic ringing in the ears  Some obscurations when she bends over her decreased vision feels like she is going to blackout    Sleepy all the time    Headaches as above    Trouble with concentration and memory    Anxiety and depression  Stressors from a maternal and committing suicide    Chest pain and palpitations  Has some bloating and GERD    No actual double vision  No dysarthria    General exam  Blood pressure 120/76, pulse 105  HEENT normal  Alert and oriented  Lungs clear  Heart rate regular  Abdomen soft  Symmetrical pulses    Neurologic exam  Alert oriented x3  Normal prosody of speech  Normal naming  Normal comprehension  Normal repetition  No aphasia  No neglect  Memory recall at bedside testing okay    Cranials 2 through 12  No ophthalmoplegia  No nystagmus  Blind spot symmetrical and  "normal bilaterally  No papilledema on funduscopic exam  Pupils equal round reactive to light  Face symmetrical  Tongue twisters good  Palate in the midline    Upper extremities  No drift  No tremor  Rapid alternating movements symmetrical  Finger-nose okay    Lower extremities  Distal proximal strength good  No spasticity    Reflexes  Symmetrical in the upper and lower extremities  No Saavedra reflex  Toes downgoing    Sensory exam  Mild decreased temperature appreciation and vibration on the left arm and leg compared to the right    Gait  Romberg negative  Tandem gait okay  Gait normal                Assessment/plan    1.  Arnold-Chiari malformation       MRI 3/20/2023       Cerebellar tonsillar ectopia with rounded tonsils protruding 8 mm beyond the foramen magnum unchanged compared to prior examination.        Varying amounts of cerebellar tonsil herniation but always mild      2.  Dysphagia/swallowing difficulty        Evaluated by ENT had formal swallow study        Minimal dysmotility of the esophagus with mild escape from the primary stripping wave which is cleared by secondary stripping waves on one of the swallows.         The other swallows demonstrated no escape from the primary stripping wave.           Gastroesophageal reflux to the level of the clavicles was elicited with provocative maneuvers.    3.   History of \"pseudotumor cerebri 2016\" lost weight        Chronic daily headache with fluctuating headaches and obscurations        Recent eyegrounds looked okay    4.   Significant stressors with the loss of her aunt        PTSD        Binge eating disorder        Depression/anxiety disorder        ADHD    Recommend  I am not sure that a lumbar puncture will help pressure might end up being normal at the time of the tap  I suspect that she may have fluctuating borderline increased ICP and is subtle and varies  Possibly this aggravates the Arnold-Chiari slightly    Diamox 250 mg tablet 1 p.o. twice daily " "go slow (previously had some paresthesias with this medicine    Already has paresthesias check B12 level    Depression  Should see primary MD with close follow-up specially with the recent suicide death of her maternal aunt    Continue following with primary for weight loss  If they think gastric bypass is appropriate if current methods do not help then that may be something to explore in regards to general health    She will follow-up with neurosurgery after she has tried the Diamox  I do not want to speak for the neurosurgeons but I would suspect that they may want to hold off on any intervention at this point    Exhaustive review of studies notes and work-up above    Discussed at length with patient empiric treatment as above for \"borderline pseudotumor exacerbating her Arnold-Chiari malformation type I\"      Call in 6 to 8 weeks and we could slowly go up on the Diamox  She will stay well-hydrated to avoid kidney stones  Knows about paresthesias    Total care time today 92 minutes      As part of the visit today  Reviewed multiple EMR notes  Reviewed neurology note 3/9/2023  Reviewed multiple studies see above  Reviewed ENT notes 3/23/2023  Reviewed neurosurgery visit 4/11/2023,  /5/3/2023, 5/23/2023         Addendum 7/14/2023  Patient felt better on the Diamox  Patient wanted to increase the dose  Recommended staying with the 250 mg tablet so we can make adjustments  Prescription for Diamox 250 mg tablet 1 p.o. 4 times daily sent in  Should keep follow-up visit in September    "

## 2023-05-31 NOTE — LETTER
5/31/2023         RE: Paula Back  [id number] [facility] [address]        Dear Colleague,    Thank you for referring your patient, Paula Back, to the [facility]. Please see a copy of my visit note below.    History of Present Illness - Paula Back is a 32 year old female presenting in clinic today for a recheck on Patient presents with:  Follow Up: Swallowing issues, neck symptoms    Patient presented with nonspecific discomfort on the right side of the neck foreign body sensation.  She had a video swallow study as well as a CT of the neck completed which were essentially normal.  With minimal dysmotility at the level of the esophagus and some reflux related activity.  Patient at that time chose to have more conservative treatment of her reflux.  Mostly dietary restrictions  She continues to to experience some the same symptomatology  Patient hassome discomfort in her ear on the right side.  Present Symptoms include: Denies any dyspnea dysphagia just nonspecific odynophagia.  Denies any other neurologic symptoms.  Patient was found to have Chiari type I malformation and will see neuro surgery to discuss further care.        BP Readings from Last 1 Encounters:   05/31/23 102/60       BP noted to be well controlled today in office.     Paula IS a smoker/uses chewing tobacco.  Paula already quit      Past Medical History - History reviewed. No pertinent past medical history.    Current Medications -   Current Outpatient Medications:      acetaminophen (TYLENOL) 325 MG tablet, Take 650 mg by mouth, Disp: , Rfl:      amphetamine-dextroamphetamine (ADDERALL XR) 20 MG 24 hr capsule, Take 20 mg by mouth daily, Disp: , Rfl:      amphetamine-dextroamphetamine (ADDERALL XR) 5 MG 24 hr capsule, , Disp: , Rfl:      clindamycin (CLINDAMAX) 1 % external gel, , Disp: , Rfl:      DULoxetine (CYMBALTA) 30 MG capsule, Take 1 capsule by mouth daily, Disp: , Rfl:      ibuprofen  (ADVIL/MOTRIN) 200 MG tablet, every 6 hours, Disp: , Rfl:      Levomefolate Glucosamine (METHYL-FOLATE PO), Take 7.5 mg by mouth daily, Disp: , Rfl:      ondansetron (ZOFRAN) 4 MG tablet, Take 4 mg by mouth, Disp: , Rfl:      prochlorperazine (COMPAZINE) 5 MG tablet, Take 5 mg by mouth, Disp: , Rfl:      propranolol (INDERAL) 10 MG tablet, Take 10 mg by mouth as needed, Disp: , Rfl:      Semaglutide-Weight Management (WEGOVY) 2.4 MG/0.75ML pen, Inject 2.4 mg Subcutaneous, Disp: , Rfl:      traZODone (DESYREL) 50 MG tablet, Take 50 mg by mouth daily, Disp: , Rfl:      vilazodone (VIIBRYD) 20 MG TABS tablet, TAKE 1 TABLET BY MOUTH EVERY MORNING WITH BREAKFAST, Disp: , Rfl:      albuterol (PROAIR HFA, PROVENTIL HFA, VENTOLIN HFA) 108 (90 BASE) MCG/ACT inhaler, Inhale 2 puffs into the lungs every 6 hours as needed for shortness of breath / dyspnea or wheezing (Patient not taking: Reported on 5/31/2023), Disp: 1 Inhaler, Rfl: 0     amoxicillin (AMOXIL) 500 MG capsule, Take 500 mg by mouth 2 times daily (Patient not taking: Reported on 5/31/2023), Disp: , Rfl:      amphetamine-dextroamphetamine (ADDERALL XR) 15 MG 24 hr capsule, Take 15 mg by mouth (Patient not taking: Reported on 5/3/2023), Disp: , Rfl:      cyclobenzaprine (FLEXERIL) 5 MG tablet, Take 5-10 mg by mouth (Patient not taking: Reported on 5/3/2023), Disp: , Rfl:      diphenhydrAMINE (BENADRYL) 25 MG tablet, diphenhydramine 25 mg tablet  1/2-2 tabs by mouth every 6-8 hours if needed for anxiety or insomnia (Patient not taking: Reported on 5/31/2023), Disp: , Rfl:      Pseudoephedrine-DM-GG-APAP (DAYQUIL LIQUICAPS OR), , Disp: , Rfl:      RA KRILL  MG CAPS, , Disp: , Rfl:      traMADol (ULTRAM) 50 MG tablet, Take 1 tablet (50 mg) by mouth every 6 hours as needed for severe pain (Patient not taking: Reported on 3/23/2023), Disp: 15 tablet, Rfl: 0    Allergies - No Known Allergies    Social History -   Social History     Socioeconomic History     Marital  "status:      Spouse name: None     Number of children: None     Years of education: None     Highest education level: None   Tobacco Use     Smoking status: Former     Packs/day: 0.25     Types: Cigarettes     Smokeless tobacco: Never     Tobacco comments:     she is not exposed to tobacco at home   Substance and Sexual Activity     Alcohol use: Yes     Comment: occas.  10 drinks through the week     Drug use: No     Sexual activity: Yes     Partners: Male     Birth control/protection: Condom, Injection   Other Topics Concern     Parent/sibling w/ CABG, MI or angioplasty before 65F 55M? No   Social History Narrative    Works at [employer] on 4:30-1 AM shift       Family History -   Family History   Problem Relation Age of Onset     Diabetes Maternal Grandfather      Cancer Maternal Grandmother         lung      Cancer Maternal Grandfather         father     Alcohol/Drug Father      Alcohol/Drug Mother        Review of Systems - As per HPI and PMHx, otherwise review of system review of the head and neck negative. Otherwise 10+ review of system is negative    Physical Exam  /60   Temp 98.5  F (36.9  C) (Temporal)   Ht 1.587 m (5' 2.48\")   Wt 106.1 kg (233 lb 12.8 oz)   BMI 42.11 kg/m    BMI: Body mass index is 42.11 kg/m .    General - The patient is well nourished and well developed, and appears to have good nutritional status.  Alert and oriented to person and place, answers questions and cooperates with examination appropriately.    SKIN - No suspicious lesions or rashes.  Respiration - No respiratory distress.  Head and Face - Normocephalic and atraumatic, with no gross asymmetry noted of the contour of the facial features.  The facial nerve is intact, with strong symmetric movements.    Voice and Breathing - The patient was breathing comfortably without the use of accessory muscles. The patients voice was clear and strong, and had appropriate pitch and quality.    Ears - Bilateral pinna " and EACs with normal appearing overlying skin. Tympanic membrane intact with good mobility on pneumatic otoscopy bilaterally. Bony landmarks of the ossicular chain are normal. The tympanic membranes are normal in appearance. No retraction, perforation, or masses.  No fluid or purulence was seen in the external canal or the middle ear.     Eyes - Extraocular movements intact.  Sclera were not icteric or injected, conjunctiva were pink and moist.    Mouth - Examination of the oral cavity showed pink, healthy oral mucosa. No lesions or ulcerations noted.  The tongue was mobile and midline, and the dentition were in good condition.      Throat - The walls of the oropharynx were smooth, pink, moist, symmetric, and had no lesions or ulcerations.  The tonsillar pillars and soft palate were symmetric. . The uvula was midline on elevation.  Palpation of the temporomandibular space intraorally also elicits lateral tenderness.    Neck - Normal midline excursion of the laryngotracheal complex during swallowing.  Full range of motion on passive movement.  Palpation of the occipital, submental, submandibular, internal jugular chain, and supraclavicular nodes did not demonstrate any abnormal lymph nodes or masses.  The carotid pulse was palpable bilaterally.  Palpation of the thyroid was soft and smooth, with no nodules or goiter appreciated.  The trachea was mobile and midline.  There is definitely tenderness on palpation around the temporomandibular joint on the right.    Nose - External contour is symmetric, no gross deflection or scars.  Nasal mucosa is pink and moist with no abnormal mucus.  The septum was midline and non-obstructive, turbinates of normal size and position.  No polyps, masses, or purulence noted on examination.    Neuro - Nonfocal neuro exam is normal, CN 2 through 12 intact, normal gait and muscle tone.      Performed in clinic today:  Attempts at mirror laryngoscopy were not possible due to gag reflex.   Therefore I proceeded with a fiberoptic examination.  First I sprayed both sides of the nose with a mixture of lidocaine and neosynephrine.  I then passed the scope through the nasal cavity.  The nasal cavity was unremarkable.  The nasopharynx was mucosally covered and symmetric.  The Eustachian tube openings were unobstructed.  Going further down I had a clear view of the base of tongue which had normal appearing lingual tonsillar tissue.  The base of tongue was free of lesions, and the vallecula was open.  The epiglottis was smooth and mucosally covered.  The supraglottic larynx was then clearly visualized.  The vocal cords moved smoothly and symmetrically, they were pearly white and no lesions were seen.  The pyriform sinuses were open, and the limited view of the postcricoid region did not show any lesions.  Of note was thickening of the posterior commissure and erythema extending to the arytenoid cartilages bilaterally without any other pathology Ambu disposable scope      A/P - Paula Back is a 32 year old female Patient presents with:  Follow Up: Swallowing issues, neck symptoms    Patient with nonspecific symptomatology in the left side of the neck.  Certainly could be reflux related.  The treatment with omeprazole 40 mg before supper in addition to other restrictions discussed and changes in lifestyle.  She will be seen neurosurgeon regarding her Chiari malformation and perhaps some of the symptoms may be attributed to that.  Also would like to refer patient to the [place] head neck pain clinic to further evaluate the TMJ area and its contribution to patient's symptoms.  If she fails to improve further may decide at that point to also get an opinion at the [facility] head neck oncology department.    Paula should follow up in 2 months.            Jason Cavazos MD          Again, thank you for allowing me to participate in the care of your patient.        Sincerely,        Jason  MD Macy, MD

## 2023-05-31 NOTE — PROGRESS NOTES
History of Present Illness - Paula Back is a 32 year old female presenting in clinic today for a recheck on Patient presents with:  Follow Up: Swallowing issues, neck symptoms    Patient presented with nonspecific discomfort on the right side of the neck foreign body sensation.  She had a video swallow study as well as a CT of the neck completed which were essentially normal.  With minimal dysmotility at the level of the esophagus and some reflux related activity.  Patient at that time chose to have more conservative treatment of her reflux.  Mostly dietary restrictions  She continues to to experience some the same symptomatology  Patient hassome discomfort in her ear on the right side.  Present Symptoms include: Denies any dyspnea dysphagia just nonspecific odynophagia.  Denies any other neurologic symptoms.  Patient was found to have Chiari type I malformation and will see neuro surgery to discuss further care.        BP Readings from Last 1 Encounters:   05/31/23 102/60       BP noted to be well controlled today in office.     Paula IS a smoker/uses chewing tobacco.  Paula already quit      Past Medical History - History reviewed. No pertinent past medical history.    Current Medications -   Current Outpatient Medications:      acetaminophen (TYLENOL) 325 MG tablet, Take 650 mg by mouth, Disp: , Rfl:      amphetamine-dextroamphetamine (ADDERALL XR) 20 MG 24 hr capsule, Take 20 mg by mouth daily, Disp: , Rfl:      amphetamine-dextroamphetamine (ADDERALL XR) 5 MG 24 hr capsule, , Disp: , Rfl:      clindamycin (CLINDAMAX) 1 % external gel, , Disp: , Rfl:      DULoxetine (CYMBALTA) 30 MG capsule, Take 1 capsule by mouth daily, Disp: , Rfl:      ibuprofen (ADVIL/MOTRIN) 200 MG tablet, every 6 hours, Disp: , Rfl:      Levomefolate Glucosamine (METHYL-FOLATE PO), Take 7.5 mg by mouth daily, Disp: , Rfl:      ondansetron (ZOFRAN) 4 MG tablet, Take 4 mg by mouth, Disp: , Rfl:      prochlorperazine (COMPAZINE) 5  MG tablet, Take 5 mg by mouth, Disp: , Rfl:      propranolol (INDERAL) 10 MG tablet, Take 10 mg by mouth as needed, Disp: , Rfl:      Semaglutide-Weight Management (WEGOVY) 2.4 MG/0.75ML pen, Inject 2.4 mg Subcutaneous, Disp: , Rfl:      traZODone (DESYREL) 50 MG tablet, Take 50 mg by mouth daily, Disp: , Rfl:      vilazodone (VIIBRYD) 20 MG TABS tablet, TAKE 1 TABLET BY MOUTH EVERY MORNING WITH BREAKFAST, Disp: , Rfl:      albuterol (PROAIR HFA, PROVENTIL HFA, VENTOLIN HFA) 108 (90 BASE) MCG/ACT inhaler, Inhale 2 puffs into the lungs every 6 hours as needed for shortness of breath / dyspnea or wheezing (Patient not taking: Reported on 5/31/2023), Disp: 1 Inhaler, Rfl: 0     amoxicillin (AMOXIL) 500 MG capsule, Take 500 mg by mouth 2 times daily (Patient not taking: Reported on 5/31/2023), Disp: , Rfl:      amphetamine-dextroamphetamine (ADDERALL XR) 15 MG 24 hr capsule, Take 15 mg by mouth (Patient not taking: Reported on 5/3/2023), Disp: , Rfl:      cyclobenzaprine (FLEXERIL) 5 MG tablet, Take 5-10 mg by mouth (Patient not taking: Reported on 5/3/2023), Disp: , Rfl:      diphenhydrAMINE (BENADRYL) 25 MG tablet, diphenhydramine 25 mg tablet  1/2-2 tabs by mouth every 6-8 hours if needed for anxiety or insomnia (Patient not taking: Reported on 5/31/2023), Disp: , Rfl:      Pseudoephedrine-DM-GG-APAP (DAYQUIL LIQUICAPS OR), , Disp: , Rfl:      RA KRILL  MG CAPS, , Disp: , Rfl:      traMADol (ULTRAM) 50 MG tablet, Take 1 tablet (50 mg) by mouth every 6 hours as needed for severe pain (Patient not taking: Reported on 3/23/2023), Disp: 15 tablet, Rfl: 0    Allergies - No Known Allergies    Social History -   Social History     Socioeconomic History     Marital status:      Spouse name: None     Number of children: None     Years of education: None     Highest education level: None   Tobacco Use     Smoking status: Former     Packs/day: 0.25     Types: Cigarettes     Smokeless tobacco: Never     Tobacco  "comments:     she is not exposed to tobacco at home   Substance and Sexual Activity     Alcohol use: Yes     Comment: occas.  10 drinks through the week     Drug use: No     Sexual activity: Yes     Partners: Male     Birth control/protection: Condom, Injection   Other Topics Concern     Parent/sibling w/ CABG, MI or angioplasty before 65F 55M? No   Social History Narrative    Works at [employer] on 4:30-1 AM shift       Family History -   Family History   Problem Relation Age of Onset     Diabetes Maternal Grandfather      Cancer Maternal Grandmother         lung      Cancer Maternal Grandfather         father     Alcohol/Drug Father      Alcohol/Drug Mother        Review of Systems - As per HPI and PMHx, otherwise review of system review of the head and neck negative. Otherwise 10+ review of system is negative    Physical Exam  /60   Temp 98.5  F (36.9  C) (Temporal)   Ht 1.587 m (5' 2.48\")   Wt 106.1 kg (233 lb 12.8 oz)   BMI 42.11 kg/m    BMI: Body mass index is 42.11 kg/m .    General - The patient is well nourished and well developed, and appears to have good nutritional status.  Alert and oriented to person and place, answers questions and cooperates with examination appropriately.    SKIN - No suspicious lesions or rashes.  Respiration - No respiratory distress.  Head and Face - Normocephalic and atraumatic, with no gross asymmetry noted of the contour of the facial features.  The facial nerve is intact, with strong symmetric movements.    Voice and Breathing - The patient was breathing comfortably without the use of accessory muscles. The patients voice was clear and strong, and had appropriate pitch and quality.    Ears - Bilateral pinna and EACs with normal appearing overlying skin. Tympanic membrane intact with good mobility on pneumatic otoscopy bilaterally. Bony landmarks of the ossicular chain are normal. The tympanic membranes are normal in appearance. No retraction, perforation, or " masses.  No fluid or purulence was seen in the external canal or the middle ear.     Eyes - Extraocular movements intact.  Sclera were not icteric or injected, conjunctiva were pink and moist.    Mouth - Examination of the oral cavity showed pink, healthy oral mucosa. No lesions or ulcerations noted.  The tongue was mobile and midline, and the dentition were in good condition.      Throat - The walls of the oropharynx were smooth, pink, moist, symmetric, and had no lesions or ulcerations.  The tonsillar pillars and soft palate were symmetric. . The uvula was midline on elevation.  Palpation of the temporomandibular space intraorally also elicits lateral tenderness.    Neck - Normal midline excursion of the laryngotracheal complex during swallowing.  Full range of motion on passive movement.  Palpation of the occipital, submental, submandibular, internal jugular chain, and supraclavicular nodes did not demonstrate any abnormal lymph nodes or masses.  The carotid pulse was palpable bilaterally.  Palpation of the thyroid was soft and smooth, with no nodules or goiter appreciated.  The trachea was mobile and midline.  There is definitely tenderness on palpation around the temporomandibular joint on the right.    Nose - External contour is symmetric, no gross deflection or scars.  Nasal mucosa is pink and moist with no abnormal mucus.  The septum was midline and non-obstructive, turbinates of normal size and position.  No polyps, masses, or purulence noted on examination.    Neuro - Nonfocal neuro exam is normal, CN 2 through 12 intact, normal gait and muscle tone.      Performed in clinic today:  Attempts at mirror laryngoscopy were not possible due to gag reflex.  Therefore I proceeded with a fiberoptic examination.  First I sprayed both sides of the nose with a mixture of lidocaine and neosynephrine.  I then passed the scope through the nasal cavity.  The nasal cavity was unremarkable.  The nasopharynx was mucosally  covered and symmetric.  The Eustachian tube openings were unobstructed.  Going further down I had a clear view of the base of tongue which had normal appearing lingual tonsillar tissue.  The base of tongue was free of lesions, and the vallecula was open.  The epiglottis was smooth and mucosally covered.  The supraglottic larynx was then clearly visualized.  The vocal cords moved smoothly and symmetrically, they were pearly white and no lesions were seen.  The pyriform sinuses were open, and the limited view of the postcricoid region did not show any lesions.  Of note was thickening of the posterior commissure and erythema extending to the arytenoid cartilages bilaterally without any other pathology Ambu disposable scope      A/P - Paula Back is a 32 year old female Patient presents with:  Follow Up: Swallowing issues, neck symptoms    Patient with nonspecific symptomatology in the left side of the neck.  Certainly could be reflux related.  The treatment with omeprazole 40 mg before supper in addition to other restrictions discussed and changes in lifestyle.  She will be seen neurosurgeon regarding her Chiari malformation and perhaps some of the symptoms may be attributed to that.  Also would like to refer patient to the [place] head neck pain clinic to further evaluate the TMJ area and its contribution to patient's symptoms.  If she fails to improve further may decide at that point to also get an opinion at the [facility] head neck oncology department.    Paula should follow up in 2 months.            Jason Cavazos MD

## 2023-06-02 ENCOUNTER — OFFICE VISIT (OUTPATIENT)
Dept: NEUROLOGY | Facility: CLINIC | Age: 33
End: 2023-06-02
Attending: NURSE PRACTITIONER
Payer: COMMERCIAL

## 2023-06-02 VITALS
SYSTOLIC BLOOD PRESSURE: 120 MMHG | HEART RATE: 105 BPM | HEIGHT: 62 IN | DIASTOLIC BLOOD PRESSURE: 76 MMHG | BODY MASS INDEX: 43.43 KG/M2 | WEIGHT: 236 LBS

## 2023-06-02 DIAGNOSIS — R20.2 PARESTHESIAS: ICD-10-CM

## 2023-06-02 DIAGNOSIS — G93.2 PSEUDOTUMOR CEREBRI SYNDROME: Primary | ICD-10-CM

## 2023-06-02 DIAGNOSIS — G93.5 CHIARI MALFORMATION TYPE I (H): ICD-10-CM

## 2023-06-02 DIAGNOSIS — H47.10 PAPILLEDEMA: ICD-10-CM

## 2023-06-02 PROCEDURE — 99205 OFFICE O/P NEW HI 60 MIN: CPT | Performed by: PSYCHIATRY & NEUROLOGY

## 2023-06-02 PROCEDURE — 99417 PROLNG OP E/M EACH 15 MIN: CPT | Performed by: PSYCHIATRY & NEUROLOGY

## 2023-06-02 RX ORDER — ACETAZOLAMIDE 250 MG/1
250 TABLET ORAL 2 TIMES DAILY
Qty: 60 TABLET | Refills: 11 | Status: SHIPPED | OUTPATIENT
Start: 2023-06-02 | End: 2023-07-14

## 2023-06-02 NOTE — LETTER
"    6/2/2023         RE: Paula Back  [address] [facility] [address]        Dear Colleague,    Thank you for referring your patient, Paula Back, to the [facility] [place]. Please see a copy of my visit note below.    In person evaluation      HPI  6/2/2023, in person consultation        32-year-old evaluated neurologically for:  Headaches  Pseudotumor cerebri question borderline  Arnold-Chiari malformation type I  Paresthesias  Chronic headache      Patient tells me that for at least a year she has been having difficulty  Palpitations neck pain headaches  She had chiropractic manipulation in 2023 and things got a lot worse    Pressure in her head was a lot worse it was every day it was more on the right side behind her eye could go down to the jaw sometimes to be on the left side more of a burning sensation sometimes it would be at the back of the head at the base of the neck    Worse with standing or sitting without her head supported  Worse bending over    She says she did feel like she is going to blackout and her vision would go blurry sometimes that was worse at the end of the day  This sounds almost like an obscuration    Back in 2016 she was seen at the [facility] they never did an LP her papilledema did not look bad to them they did not think she had pseudotumor                Onset of symptoms mid February 2023 after chiropractic manipulation the neck.  Next day started having pain in her esophagus  Some difficulty with swallowing felt like she was being choked  Seen in the ER for dysphagia on 2/24/2023  Has been evaluated by ENT (swallow eval with speech pathology)  Barium swallow with significant gastroesophageal reflux, ENT felt there may have been some soft tissue injury from chiropractic manipulation that aggravated some of her swallowing difficulty  Developed headaches      Patient also had difficulty with \"dizziness\" and off-balance sensation for 6-9 " "months worse after chiropractic manipulation February 2023  He had head pressure/flushing/graying out of vision for a minute and makes her feel like she is deaf due to a head rush.    Patient with a pressure-like pain over the right eye and behind the eye  Also has a burning sensation over the left eye radiating to the top of the head    Headaches are daily  Last about 10 minutes    Currently on Midol complete daily    Shifting positions helps improve them  Coughing and some positions will make it worse  There is some discomfort in the midline neck  Has numbness feeling the sensation being pulled down over her shoulders    She also notes a mental fogginess    She does have some night sweats and temperature dysregulation  Some difficulty with swallowing    She initially was told by ophthalmology that she had level 1-2 papilledema both eyes  ([facility] ([place], [place]) eye specialist evaluated 3/15/2023 felt that patient had optic nerve edema OD greater than OS      A.  Pseudotumor cerebri 2016       Had optic nerve swelling       \"Symptoms resolved after 70 pound weight loss\" 2017       \"Had incidental papilledema on eye exam\"        Saw [facility] they never did a spinal tap as her eyegrounds looked okay        Had some headaches but they were not so bad      B.  Arnold-Chiari malformation        MRI March 2023 8 mm cerebellar ectopia        Evaluated by neurosurgery several times       Initial 3/15/2023 question optic nerve edema OD greater than OS       Neuro-ophthalmology eval, RNFL 5/30/2023 normal bilaterally    C.  Paresthesias        More left-sided        B12 321, (2021)    D.  Under a lot of stress/anxiety        Maternal aunt who had pseudotumor cerebri recently killed herself May 2023        Increase stressors    E.   Significant GERD               Past medical history  Pseudotumor cerebri in 2016 (resolved with 70 pound weight loss)  Arnold-Chiari malformation  Pelvic pain  Low-grade squamous intraepithelial lesion " of the cervix  PTSD  Binge eating disorder  Depression/anxiety disorder  ADHD      Habits  Past smoker quarter pack of cigarettes per day  Alcohol 10/week  History of smoking some marijuana    Family history  Mother alcohol use/bipolar  Father alcohol use  Maternal grandfather liver cancer/diabetes  Maternal grandmother lung cancer  Paternal grandfather testicular cancer  Paternal grandmother heart disease/stroke  Brother anxiety/depression  Sister anxiety/depression        Work-up  MRI cervical spine 1/11/2023  C4-C5 moderate-severe left foraminal stenosis  C5-C6 moderate left foraminal stenosis, mild canal stenosis  1.  Unremarkable cervical cord.   2.  No high-grade spinal canal stenosis.   3.  At C4-C5, a left lateral/foraminal protrusion contributes to moderate-to-severe left neural foraminal stenosis.   4.  At C5-C6, there is moderate left neural foraminal stenosis.   5.  Additional degenerative changes see official report  6.  Cerebellar tonsillar ectopia 5-6 mm.  HEAD CTA: 2/24/2023  1.  Patent intracranial arterial vasculature without flow-limiting stenosis.   2.  No aneurysm.   NECK CTA: 2/24/2023  1.  Patent cervical arterial vasculature without flow-limiting stenosis.   2.  No evidence of dissection.  Video swallow and barium swallow March 2023                                               IMPRESSION:  1. Normal swallow study.  2. Minimal dysmotility of the esophagus with mild escape from the  primary stripping wave which is cleared by secondary stripping waves  on one of the swallows. The other swallows demonstrated no escape from  the primary stripping wave.  3. Gastroesophageal reflux to the level of the clavicles was elicited  with provocative maneuvers.  4. No other abnormalities are identified. I discussed these findings  with the patient at time of exam.  5. Please see also speech pathology report for additional information  on the swallow portion of the study.    MRA head 3/19/2023  1.  Normal  exam  MRA cervical vessels without and with contrast 3/19/2023  1.  Normal exam  MRI head with and without contrast 3/19/2023  1. No acute intracranial abnormality evident.   2. No intracranial mass or mass effect.       No abnormal intracranial enhancement. No focal parenchymal signal abnormality.   3. Cerebellar tonsillar ectopia with rounded tonsils protruding 8 mm beyond the foramen magnum unchanged compared to prior examination.       No hydrocephalus.       No additional findings to suggest intracranial hypotension.    MRI head 4/24/2023  1.  Redemonstrated cerebellar tonsillar ectopia measuring up to 1 cm below the foramen magnum.        This is again in a manner compatible with a Chiari I malformation.        CSF flow study demonstrates abundant flow ventral to the cervicomedullary junction,        with additionally preserved flow across the foramen of Magendie.        There is only a very small amount of flow dorsal to the cerebellar tonsils.  CTV head neck with contrast 5/23/2023  Patent dural venous sinuses and major deep intracranial veins.  RNFL 5/30/2023 normal bilaterally      Exam    Review of systems  Neck and back pain some arm pain bilaterally  Numbness worse on the left side  Weakness worse on the left arm and leg with some difficulty walking  Poor balance  Has some bladder difficulties    Speech and swallow difficulties    Has occasional changes in her hearing and chronic ringing in the ears  Some obscurations when she bends over her decreased vision feels like she is going to blackout    Sleepy all the time    Headaches as above    Trouble with concentration and memory    Anxiety and depression  Stressors from a maternal and committing suicide    Chest pain and palpitations  Has some bloating and GERD    No actual double vision  No dysarthria    General exam  Blood pressure 120/76, pulse 105  HEENT normal  Alert and oriented  Lungs clear  Heart rate regular  Abdomen soft  Symmetrical  "pulses    Neurologic exam  Alert oriented x3  Normal prosody of speech  Normal naming  Normal comprehension  Normal repetition  No aphasia  No neglect  Memory recall at bedside testing okay    Cranials 2 through 12  No ophthalmoplegia  No nystagmus  Blind spot symmetrical and normal bilaterally  No papilledema on funduscopic exam  Pupils equal round reactive to light  Face symmetrical  Tongue twisters good  Palate in the midline    Upper extremities  No drift  No tremor  Rapid alternating movements symmetrical  Finger-nose okay    Lower extremities  Distal proximal strength good  No spasticity    Reflexes  Symmetrical in the upper and lower extremities  No Saavedra reflex  Toes downgoing    Sensory exam  Mild decreased temperature appreciation and vibration on the left arm and leg compared to the right    Gait  Romberg negative  Tandem gait okay  Gait normal                Assessment/plan    1.  Arnold-Chiari malformation       MRI 3/20/2023       Cerebellar tonsillar ectopia with rounded tonsils protruding 8 mm beyond the foramen magnum unchanged compared to prior examination.        Varying amounts of cerebellar tonsil herniation but always mild      2.  Dysphagia/swallowing difficulty        Evaluated by ENT had formal swallow study        Minimal dysmotility of the esophagus with mild escape from the primary stripping wave which is cleared by secondary stripping waves on one of the swallows.         The other swallows demonstrated no escape from the primary stripping wave.           Gastroesophageal reflux to the level of the clavicles was elicited with provocative maneuvers.    3.   History of \"pseudotumor cerebri 2016\" lost weight        Chronic daily headache with fluctuating headaches and obscurations        Recent eyegrounds looked okay    4.   Significant stressors with the loss of her aunt        PTSD        Binge eating disorder        Depression/anxiety disorder        ADHD    Recommend  I am not sure " "that a lumbar puncture will help pressure might end up being normal at the time of the tap  I suspect that she may have fluctuating borderline increased ICP and is subtle and varies  Possibly this aggravates the Arnold-Chiari slightly    Diamox 250 mg tablet 1 p.o. twice daily go slow (previously had some paresthesias with this medicine    Already has paresthesias check B12 level    Depression  Should see primary MD with close follow-up specially with the recent suicide death of her maternal aunt    Continue following with primary for weight loss  If they think gastric bypass is appropriate if current methods do not help then that may be something to explore in regards to general health    She will follow-up with neurosurgery after she has tried the Diamox  I do not want to speak for the neurosurgeons but I would suspect that they may want to hold off on any intervention at this point    Exhaustive review of studies notes and work-up above    Discussed at length with patient empiric treatment as above for \"borderline pseudotumor exacerbating her Arnold-Chiari malformation type I\"      Call in 6 to 8 weeks and we could slowly go up on the Diamox  She will stay well-hydrated to avoid kidney stones  Knows about paresthesias    Total care time today 92 minutes      As part of the visit today  Reviewed multiple EMR notes  Reviewed neurology note 3/9/2023  Reviewed multiple studies see above  Reviewed ENT notes 3/23/2023  Reviewed neurosurgery visit 4/11/2023,  /5/3/2023, 5/23/2023         Again, thank you for allowing me to participate in the care of your patient.        Sincerely,        Brandon Bautista MD    "

## 2023-06-02 NOTE — NURSING NOTE
Chief Complaint   Patient presents with     New Patient     Chiari malformation and headaches. She states she has head pressure/headaches, pressure behind right eye daily. She will blackout and have blurry vision towards the end of the day. Left sided weakness from her chin to her toes, this is present all the time. She also has swallowing difficulty and bloating     Dottie Benson LPN on 6/2/2023 at 10:33 AM

## 2023-06-27 ENCOUNTER — TELEPHONE (OUTPATIENT)
Dept: NEUROSURGERY | Facility: CLINIC | Age: 33
End: 2023-06-27
Payer: COMMERCIAL

## 2023-06-28 NOTE — TELEPHONE ENCOUNTER
RECORDS RECEIVED FROM: Care Everywhere   REASON FOR VISIT: Cervical degenerative changes and lumbar spine disc bulges   Date of Appt: 7/11/23 7:30am    NOTES (FOR ALL VISITS) STATUS DETAILS   OFFICE NOTE from referring provider Internal 5/23/23 Micheal Olivares MD @[facility]     OFFICE NOTE from other specialist Care Everywhere 6/2/23 Brandon Bautista MD @[facility]    5/30/23 Tarun Espinal MD @[facility]    5/3/23, 4/11/23 Misa Andrews NP @[facility]     4/21/23, 3/17/23, 3/1/23 Rigoberto French MD @[facility]    3/27/23, 3/9/23 Merritt Rowell MD @[facility]    3/23/23 [facility]    2/16/23 [place] MD @[facility]    1/24/23 Sebastián Núñez and Rigoberto French @[facility]    See Additional Encounters   DISCHARGE REPORT from the ER Care Everywhere 3/2/23 Gato Vasquez MD @[facility] ED    2/24/23 Muriel Jesus DO  @[facility] ED    2/24/23 Sachin West MD @[facility] ED    1/1/23 Pauline Batres PA  @[facility] ED     MEDICATION LIST Internal    IMAGING  (FOR ALL VISITS)     MRI (HEAD, NECK, SPINE) Internal [facility]  4/24/23 MR Lumbar Spine  4/24/23 MR Thoracic Spine  4/24/23 MR Cervical Spine  4/24/23 MR Brain    N  3/19/23 MR Brain  3/19/23 MRA Neck (Carotid)  3/19/23 MRA Head    [facility]  1/9/23 MR Cervical Spine     CT (HEAD, NECK, SPINE) Internal [facility]  5/23/23 CTV Head Neck  2/24/23 CT Soft Tissue Neck    [facility]  2/24/23 CTA Head & Neck Carotid

## 2023-07-11 ENCOUNTER — PRE VISIT (OUTPATIENT)
Dept: NEUROSURGERY | Facility: CLINIC | Age: 33
End: 2023-07-11

## 2023-07-11 ENCOUNTER — OFFICE VISIT (OUTPATIENT)
Dept: NEUROSURGERY | Facility: CLINIC | Age: 33
End: 2023-07-11
Payer: COMMERCIAL

## 2023-07-11 VITALS
BODY MASS INDEX: 42.68 KG/M2 | SYSTOLIC BLOOD PRESSURE: 110 MMHG | HEART RATE: 98 BPM | HEIGHT: 62 IN | WEIGHT: 231.9 LBS | DIASTOLIC BLOOD PRESSURE: 74 MMHG | OXYGEN SATURATION: 99 %

## 2023-07-11 DIAGNOSIS — R29.898 LEFT ARM WEAKNESS: ICD-10-CM

## 2023-07-11 DIAGNOSIS — M50.121 CERVICAL DISC DISORDER AT C4-C5 LEVEL WITH RADICULOPATHY: ICD-10-CM

## 2023-07-11 DIAGNOSIS — R20.0 LEFT SIDED NUMBNESS: Primary | ICD-10-CM

## 2023-07-11 PROCEDURE — 99215 OFFICE O/P EST HI 40 MIN: CPT | Performed by: PHYSICIAN ASSISTANT

## 2023-07-11 PROCEDURE — 99417 PROLNG OP E/M EACH 15 MIN: CPT | Performed by: PHYSICIAN ASSISTANT

## 2023-07-11 ASSESSMENT — PAIN SCALES - GENERAL: PAINLEVEL: MODERATE PAIN (4)

## 2023-07-11 NOTE — PROGRESS NOTES
Neurosurgery Clinic Note    Chief Complaint: back pain    History of Present Illness:  It was a pleasure to evaluate Paula Back in clinic today at the kind referral of   Micheal Olivares MD  [address] [address].    Paula Back is a 32 year old female with a PMH of Chiari Malformation Type I, papilledema, BMI 43 ([place]), pseudotumor cerebri, chronic headache, left paresthesias, who is presenting for evaluation of back pain.    MRI cervical is with left disc/osteophyte complex affecting left lateral recess and mod/sev NFS at C4/5 and to a lesser degree at C5/6.  There are no syrnx.  Thoracic and lumbar MRI are without any significant pathology.     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  Onset around August 2022.  She describes pain in her left shoulder and down the back of her arm into her 4th and 5th digits.  It was more of an aching, gnawing sensation.  She had issues with holding on to things with her left hand (she is right-handed).  She denies similar issues on the right side, but noted at that time she had stiffness on the right.  Her pain would be an 8-9/10 and after an the injection she was a 2/10.  She underwent C7/T1 PEG on 1/30/23 at [facility] which helped with pain for about 2.5 months and now starting to come back.  She has full body weakness on her left side.  She has a pain in her middle spine lower down.  She tends to fold in at this spot in her spine.  She feels the herniation in her neck is causing weakness and she relies on her right side to hold her neck up.  She has done PT at [facility] in [place] for 6 months and had minimal results.  They had her doing nerve glides and posture related exercises.      She notes pain in her hip and travels down her leg on the left.  She has painless redness in her lateral toes occasionally.  She notes numbness on the side of her foot and lateral toes.  She notices this more when she is getting out of the shower.  She wears  socks, so does not notice how long this lasts.  She notes reduced sensation on the outside of her left hip (down to about the GT bursa).      She notes issues with feeling the urge to void until she gets to the point of having pressure/fullness in her kidneys.  She has had workup by urology w/o etiology.  She also notes problems having a BM without using laxatives, which has been an issue for the last couple of months.  She denies incontinent episodes.  She notes numbness of her buttock and posterior keerthi area on the left.  She denies her torso is numb, but describes this area as painful because of slouching.      She has head pressure which makes her dizzy, so she does not walk fast.  She denies any gross problems with her balance.      She has not had any prior surgeries on her neck or back.      About 2 weeks after injection she felt that something snapped in her neck.  This felt as if something was blocking her swallowing, but she did not have any issues swallowing.  Her voice was hoarse overnight, but the next day was back to normal.  ENT checked her out and she did not have any blockage.  She was given steroids and this cleared up.      Patient notes some weakness, which is accompanied by shaking of her hands when she is trying to do a task.        Conservative Treatment:  PT at [facility] in [place] for 6 months and had minimal results.  Chiropractic manipulation - 2/2023 [facility]  Medical weight loss program [facility] ([place])  Duloxetine per PCP  Ice/heat  Voltaren gel      Review of Systems   As noted in HPI    No past medical history on file.    Past Surgical History:   Procedure Laterality Date     LASIK         Social History     Socioeconomic History     Marital status:    Tobacco Use     Smoking status: Former     Packs/day: 0.25     Types: Cigarettes     Smokeless tobacco: Never     Tobacco comments:     she is not exposed to tobacco at home   Substance and Sexual Activity     Alcohol use: Yes      Comment: rare/once a month or less     Drug use: No     Sexual activity: Yes     Partners: Male     Birth control/protection: Condom, Injection   Other Topics Concern     Parent/sibling w/ CABG, MI or angioplasty before 65F 55M? No   Social History Narrative    Works at [employer] on 4:30-1 AM shift       family history includes Alcohol/Drug in her father and mother; Cancer in her maternal grandfather and maternal grandmother; Diabetes in her maternal grandfather.       IMAGING   Imaging independently reviewed.    Cervical MRI 4/24/23 -     IMPRESSION:  1.  Redemonstrated cerebellar tonsillar ectopia compatible in  appearance with a Chiari I malformation. CSF flow study is similar to  what was described on separate brain MRI.     2.  Cervical spine degenerative change as above without high-grade  spinal canal stenosis at any level. There is an unchanged moderate to  severe left C4-C5 and mild to moderate left C5-C6 neural foraminal  stenosis with moderate narrowing of the left C4-C5 lateral recess.            Brain MRI 4/24/23 -   IMPRESSION: Redemonstrated cerebellar tonsillar ectopia measuring up  to 1 cm below the foramen magnum. This is again in a manner compatible  with a Chiari I malformation. CSF flow study demonstrates abundant  flow ventral to the cervicomedullary junction, with additionally  preserved flow across the foramen of Magendie. There is only a very  small amount of flow dorsal to the cerebellar tonsils.    MRI Lumbar 4/24/23 -   IMPRESSION:  1.  Multilevel mild facet arthropathy in the lumbar spine as detailed  above. Accompanying degenerative disc disease is overall minimal, with  small posterior disc bulges that contribute to mild bilateral neural  foraminal stenosis at the L2-L3 to the L4-L5 levels. There is only  mild spinal canal stenosis at the L4-L5 level, with no high-grade  spinal canal stenosis at any level.     2.  Mild prominence of the right renal collecting system and  right  ureter. Ultrasound could be considered in further evaluation if  warranted.      MRI thoracic 4/24/23 -   IMPRESSION: Overall unremarkable thoracic spine MRI without  significant degenerative change. No high-grade spinal canal or neural  foraminal stenosis.       Physical Exam     There were no vitals taken for this visit.    Constitutional: Appears well-developed and well-nourished. Cooperative. No acute distress.   HENT:   Head: Normocephalic and atraumatic.   Eyes: Conjunctivae are normal.  Neck: Neck supple. No tracheal deviation present.  Cardiovascular: Normal rate and regular rhythm.    Pulmonary/Chest: Effort normal and breath sounds normal.  Abdominal: Exhibits no obvious distension.   Musculoskeletal: Able to move all extremities.  No involuntary motor movements. Mild mid/lower lumbar right paraspinal tension.     Cervical flexion-extension range of motion: guarding w/ F/E, no significant pain with ROM  Skin: Skin is warm, dry and intact.   Psychiatric: Normal mood and affect. Speech is normal and behavior is normal.    Neurological:  Alert, NAD, and oriented to person, place, and time.   No cranial nerve deficit   Gait: steady, symmetrical, able to tandem walk w/o significant imbalance    Strength (L/R)  5/5 Deltoid  5/5 Bicep  5/5 Wrist Extensor  4+/5 Tricep (trace weakness on left)  5/5 Finger Flexion  5/5 Finger Abduction  5/5 Handgrip    5/5 Hip Flexion  5/5 Knee Extension  5/5 Ankle Dorsiflexion  5/5 Extensor Hallucis Longus  5/5 Plantar Flexion    Reflexes (L/R)  2+/2+ Bicep  2+/2+ Brachioradialis  2+/2+ Patellar  2+/2+ Ankle    No Babinski  No Lhermitte's  No Spurling's  No Juli's   No ankle clonus    Sensation: left sided numbness from neck through shoulder, LUE in tricep distribution, 5th digit of hand, torso below breast line down, buttock, LLE, foot (sparing the dorsal and plantar aspect of foot)    +bilateral GT bursal TTP, negative SI joint TTP     ASSESSMENT:  Paula Back is  a 32 year old female with a PMH of Chiari Malformation Type I, papilledema, BMI 43 ([place]), pseudotumor cerebri, chronic headache, left body paresthesias, who is presenting for evaluation of neck and back pain.  Her lumbar and thoracic MRIs are grossly benign.  She does have C4/5 left HNP in the lateral recess and mod/sev NFS and to a lesser degree at C5/6.  This would only explain the pain she has been experiencing in her left shoulder and upper arm area. She indicates an area in her low back that is painful on the right side.  This is most consistent with muscular pain.      PLAN:    Referral for EMG study.    Follow up once that is completed.     Keep PM&R appointment as scheduled 8/2023.  Discuss further injections and PT with them.      I spent 95 minutes spent in patient care, independent review and interpretation of medical records/imaging, reviewing old records.      Marissa Wang PA-C  Department of Neurosurgery  Office: [phone]

## 2023-07-11 NOTE — PATIENT INSTRUCTIONS
Recommend PM&R to consider therapy and spinal injections as needed (keep appointment that you already have).      EMG order placed.  They will call you to schedule this.      Follow up with me after the EMG has been completed.

## 2023-07-11 NOTE — LETTER
7/11/2023       RE: Paula Back  [id number] [address] [id number]       Dear Colleague,    Thank you for referring your patient, Paula Back, to the [facility] NEUROSURGERY CLINIC [place] at [facility]. Please see a copy of my visit note below.        Neurosurgery Clinic Note    Chief Complaint: back pain    History of Present Illness:  It was a pleasure to evaluate Paula Back in clinic today at the kind referral of   Micheal Olivares MD  [address]  [place] [address].    Paula Back is a 32 year old female with a PMH of Chiari Malformation Type I, papilledema, BMI 43 (Wegovy), pseudotumor cerebri, chronic headache, left paresthesias, who is presenting for evaluation of back pain.    MRI cervical is with left disc/osteophyte complex affecting left lateral recess and mod/sev NFS at C4/5 and to a lesser degree at C5/6.  There are no syrnx.  Thoracic and lumbar MRI are without any significant pathology.     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  Onset around August 2022.  She describes pain in her left shoulder and down the back of her arm into her 4th and 5th digits.  It was more of an aching, gnawing sensation.  She had issues with holding on to things with her left hand (she is right-handed).  She denies similar issues on the right side, but noted at that time she had stiffness on the right.  Her pain would be an 8-9/10 and after an the injection she was a 2/10.  She underwent C7/T1 PEG on 1/30/23 at [facility] which helped with pain for about 2.5 months and now starting to come back.  She has full body weakness on her left side.  She has a pain in her middle spine lower down.  She tends to fold in at this spot in her spine.  She feels the herniation in her neck is causing weakness and she relies on her right side to hold her neck up.  She has done PT at [facility] in [place] for 6 months and had minimal results.   They had her doing nerve glides and posture related exercises.      She notes pain in her hip and travels down her leg on the left.  She has painless redness in her lateral toes occasionally.  She notes numbness on the side of her foot and lateral toes.  She notices this more when she is getting out of the shower.  She wears socks, so does not notice how long this lasts.  She notes reduced sensation on the outside of her left hip (down to about the GT bursa).      She notes issues with feeling the urge to void until she gets to the point of having pressure/fullness in her kidneys.  She has had workup by urology w/o etiology.  She also notes problems having a BM without using laxatives, which has been an issue for the last couple of months.  She denies incontinent episodes.  She notes numbness of her buttock and posterior keerthi area on the left.  She denies her torso is numb, but describes this area as painful because of slouching.      She has head pressure which makes her dizzy, so she does not walk fast.  She denies any gross problems with her balance.      She has not had any prior surgeries on her neck or back.      About 2 weeks after injection she felt that something snapped in her neck.  This felt as if something was blocking her swallowing, but she did not have any issues swallowing.  Her voice was hoarse overnight, but the next day was back to normal.  ENT checked her out and she did not have any blockage.  She was given steroids and this cleared up.      Patient notes some weakness, which is accompanied by shaking of her hands when she is trying to do a task.        Conservative Treatment:  PT at [facility] in [place] for 6 months and had minimal results.  Chiropractic manipulation - 2/2023 [facility]  Medical weight loss program [facility] (Wegovy)  Duloxetine per PCP  Ice/heat  Voltaren gel      Review of Systems   As noted in HPI    No past medical history on file.    Past Surgical History:   Procedure  Laterality Date    LASIK         Social History     Socioeconomic History    Marital status:    Tobacco Use    Smoking status: Former     Packs/day: 0.25     Types: Cigarettes    Smokeless tobacco: Never    Tobacco comments:     she is not exposed to tobacco at home   Substance and Sexual Activity    Alcohol use: Yes     Comment: rare/once a month or less    Drug use: No    Sexual activity: Yes     Partners: Male     Birth control/protection: Condom, Injection   Other Topics Concern    Parent/sibling w/ CABG, MI or angioplasty before 65F 55M? No   Social History Narrative    Works at [employer] on 4:30-1 AM shift       family history includes Alcohol/Drug in her father and mother; Cancer in her maternal grandfather and maternal grandmother; Diabetes in her maternal grandfather.       IMAGING   Imaging independently reviewed.    Cervical MRI 4/24/23 -     IMPRESSION:  1.  Redemonstrated cerebellar tonsillar ectopia compatible in  appearance with a Chiari I malformation. CSF flow study is similar to  what was described on separate brain MRI.     2.  Cervical spine degenerative change as above without high-grade  spinal canal stenosis at any level. There is an unchanged moderate to  severe left C4-C5 and mild to moderate left C5-C6 neural foraminal  stenosis with moderate narrowing of the left C4-C5 lateral recess.            Brain MRI 4/24/23 -   IMPRESSION: Redemonstrated cerebellar tonsillar ectopia measuring up  to 1 cm below the foramen magnum. This is again in a manner compatible  with a Chiari I malformation. CSF flow study demonstrates abundant  flow ventral to the cervicomedullary junction, with additionally  preserved flow across the foramen of Magendie. There is only a very  small amount of flow dorsal to the cerebellar tonsils.    MRI Lumbar 4/24/23 -   IMPRESSION:  1.  Multilevel mild facet arthropathy in the lumbar spine as detailed  above. Accompanying degenerative disc disease is overall  minimal, with  small posterior disc bulges that contribute to mild bilateral neural  foraminal stenosis at the L2-L3 to the L4-L5 levels. There is only  mild spinal canal stenosis at the L4-L5 level, with no high-grade  spinal canal stenosis at any level.     2.  Mild prominence of the right renal collecting system and right  ureter. Ultrasound could be considered in further evaluation if  warranted.      MRI thoracic 4/24/23 -   IMPRESSION: Overall unremarkable thoracic spine MRI without  significant degenerative change. No high-grade spinal canal or neural  foraminal stenosis.       Physical Exam     There were no vitals taken for this visit.    Constitutional: Appears well-developed and well-nourished. Cooperative. No acute distress.   HENT:   Head: Normocephalic and atraumatic.   Eyes: Conjunctivae are normal.  Neck: Neck supple. No tracheal deviation present.  Cardiovascular: Normal rate and regular rhythm.    Pulmonary/Chest: Effort normal and breath sounds normal.  Abdominal: Exhibits no obvious distension.   Musculoskeletal: Able to move all extremities.  No involuntary motor movements. Mild mid/lower lumbar right paraspinal tension.     Cervical flexion-extension range of motion: guarding w/ F/E, no significant pain with ROM  Skin: Skin is warm, dry and intact.   Psychiatric: Normal mood and affect. Speech is normal and behavior is normal.    Neurological:  Alert, NAD, and oriented to person, place, and time.   No cranial nerve deficit   Gait: steady, symmetrical, able to tandem walk w/o significant imbalance    Strength (L/R)  5/5 Deltoid  5/5 Bicep  5/5 Wrist Extensor  4+/5 Tricep (trace weakness on left)  5/5 Finger Flexion  5/5 Finger Abduction  5/5 Handgrip    5/5 Hip Flexion  5/5 Knee Extension  5/5 Ankle Dorsiflexion  5/5 Extensor Hallucis Longus  5/5 Plantar Flexion    Reflexes (L/R)  2+/2+ Bicep  2+/2+ Brachioradialis  2+/2+ Patellar  2+/2+ Ankle    No Babinski  No Lhermitte's  No Spurling's  No  Juli's   No ankle clonus    Sensation: left sided numbness from neck through shoulder, LUE in tricep distribution, 5th digit of hand, torso below breast line down, buttock, LLE, foot (sparing the dorsal and plantar aspect of foot)    +bilateral GT bursal TTP, negative SI joint TTP     ASSESSMENT:  Paula Back is a 32 year old female with a PMH of Chiari Malformation Type I, papilledema, BMI 43 (Wegovy), pseudotumor cerebri, chronic headache, left body paresthesias, who is presenting for evaluation of neck and back pain.  Her lumbar and thoracic MRIs are grossly benign.  She does have C4/5 left HNP in the lateral recess and mod/sev NFS and to a lesser degree at C5/6.  This would only explain the pain she has been experiencing in her left shoulder and upper arm area. She indicates an area in her low back that is painful on the right side.  This is most consistent with muscular pain.      PLAN:    Referral for EMG study.    Follow up once that is completed.     Keep PM&R appointment as scheduled 8/2023.  Discuss further injections and PT with them.      I spent 95 minutes spent in patient care, independent review and interpretation of medical records/imaging, reviewing old records.        Again, thank you for allowing me to participate in the care of your patient.      Sincerely,    Marissa Wang PA-C

## 2023-07-13 ENCOUNTER — MYC MEDICAL ADVICE (OUTPATIENT)
Dept: NEUROLOGY | Facility: CLINIC | Age: 33
End: 2023-07-13
Payer: COMMERCIAL

## 2023-07-13 NOTE — TELEPHONE ENCOUNTER
Dr. Bautista-  See pt's [employer] messages and reply via [employer].    Dottie Benson LPN on 7/13/2023 at 11:36 AM

## 2023-07-14 RX ORDER — ACETAZOLAMIDE 250 MG/1
250 TABLET ORAL 4 TIMES DAILY
Qty: 120 TABLET | Refills: 11 | Status: SHIPPED | OUTPATIENT
Start: 2023-07-14 | End: 2023-09-05

## 2023-08-01 ENCOUNTER — OFFICE VISIT (OUTPATIENT)
Dept: PHYSICAL MEDICINE AND REHAB | Facility: CLINIC | Age: 33
End: 2023-08-01
Payer: COMMERCIAL

## 2023-08-01 DIAGNOSIS — G93.5 CHIARI MALFORMATION TYPE I (H): ICD-10-CM

## 2023-08-01 PROCEDURE — 99205 OFFICE O/P NEW HI 60 MIN: CPT | Performed by: PHYSICAL MEDICINE & REHABILITATION

## 2023-08-01 ASSESSMENT — PATIENT HEALTH QUESTIONNAIRE - PHQ9: SUM OF ALL RESPONSES TO PHQ QUESTIONS 1-9: 10

## 2023-08-01 NOTE — PROGRESS NOTES
CC: I am seeing this patient for consideration of injections and additional therapies.    Patient is a 33-year-old woman with a known history of Chiari malformation type I.  She has been evaluated by neurology and is taking Diamox.  She has been evaluated by neurosurgery and has had MRI of the cervical thoracic and lumbar spine.  She has a left disc osteophyte complex affecting left lateral recess with moderate to severe neuroforaminal stenosis at C4-5.  No syrinx noted thoracic and lumbar MRI without significant pathology.  She is due for an EMG in the next couple of weeks.    She does acknowledge that chiropractic had increased her symptoms with a flare and she is no longer planning to get chiropractic.  She has been doing physical therapy for close to a year and currently does it once every 2 weeks.  She also had a flareup in her neck symptoms with one of the therapy sessions though she was not being manipulated.    Patient drove from [place] today to attend this visit.  She is participating in a weeklong family With 50 other members including her , son and other family members.    She does acknowledge considerable stressors and losses in her life including the death of her father due to overdose this year and death of her aunt from suicide.  She also notes that her mother has drug issues as well.    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  She notes she has ergonomic set up and has bought some furniture on her own.  At home, she takes care of her  and her 4-year-old son and 2 cats.  Her son is 4 years and approximately 40 pounds and she does occasionally carry him.  She denies tobacco or alcohol and other drugs.  She does take THC.    She reports doing some meditation and listens to relaxing music when she gets anxiety.  As mentioned previously she follows up with her psychiatrist on a regular basis who helps her with medications and she also sees a psychologist for talk  therapy.  She plans to see them in the next few weeks.    Per chart review:   Past medical history  Pseudotumor cerebri in 2016 (resolved with 70 pound weight loss)  Arnold-Chiari malformation  Pelvic pain  Low-grade squamous intraepithelial lesion of the cervix  PTSD  Binge eating disorder  Depression/anxiety disorder  ADHD        Habits  Past smoker quarter pack of cigarettes per day  Alcohol 10/week  History of smoking some marijuana     Family history  Mother alcohol use/bipolar  Father alcohol use  Maternal grandfather liver cancer/diabetes  Maternal grandmother lung cancer  Paternal grandfather testicular cancer  Paternal grandmother heart disease/stroke  Brother anxiety/depression  Sister anxiety/depression           Work-up  MRI cervical spine 1/11/2023  C4-C5 moderate-severe left foraminal stenosis  C5-C6 moderate left foraminal stenosis, mild canal stenosis  1.  Unremarkable cervical cord.   2.  No high-grade spinal canal stenosis.   3.  At C4-C5, a left lateral/foraminal protrusion contributes to moderate-to-severe left neural foraminal stenosis.   4.  At C5-C6, there is moderate left neural foraminal stenosis.   5.  Additional degenerative changes see official report  6.  Cerebellar tonsillar ectopia 5-6 mm.  HEAD CTA: 2/24/2023  1.  Patent intracranial arterial vasculature without flow-limiting stenosis.   2.  No aneurysm.   NECK CTA: 2/24/2023  1.  Patent cervical arterial vasculature without flow-limiting stenosis.   2.  No evidence of dissection.  Video swallow and barium swallow March 2023                                               IMPRESSION:  1. Normal swallow study.  2. Minimal dysmotility of the esophagus with mild escape from the  primary stripping wave which is cleared by secondary stripping waves  on one of the swallows. The other swallows demonstrated no escape from  the primary stripping wave.  3. Gastroesophageal reflux to the level of the clavicles was elicited  with provocative  maneuvers.  4. No other abnormalities are identified. I discussed these findings  with the patient at time of exam.  5. Please see also speech pathology report for additional information  on the swallow portion of the study.     MRA head 3/19/2023  1.  Normal exam  MRA cervical vessels without and with contrast 3/19/2023  1.  Normal exam  MRI head with and without contrast 3/19/2023  1. No acute intracranial abnormality evident.   2. No intracranial mass or mass effect.       No abnormal intracranial enhancement. No focal parenchymal signal abnormality.   3. Cerebellar tonsillar ectopia with rounded tonsils protruding 8 mm beyond the foramen magnum unchanged compared to prior examination.       No hydrocephalus.       No additional findings to suggest intracranial hypotension.    MRI head 4/24/2023  1.  Redemonstrated cerebellar tonsillar ectopia measuring up to 1 cm below the foramen magnum.        This is again in a manner compatible with a Chiari I malformation.        CSF flow study demonstrates abundant flow ventral to the cervicomedullary junction,        with additionally preserved flow across the foramen of Magendie.        There is only a very small amount of flow dorsal to the cerebellar tonsils.  CTV head neck with contrast 5/23/2023  Patent dural venous sinuses and major deep intracranial veins.  RNFL 5/30/2023 normal bilaterally     No papilledema per Dr. Tarun Espinal.    Social History     Social History Narrative    Works at [employer] on 4:30-1 AM shift       Current Outpatient Medications   Medication Sig Dispense Refill    acetaminophen (TYLENOL) 325 MG tablet Take 650 mg by mouth      acetaZOLAMIDE (DIAMOX) 250 MG tablet Take 1 tablet (250 mg) by mouth 4 times daily 120 tablet 11    amphetamine-dextroamphetamine (ADDERALL XR) 20 MG 24 hr capsule Take 20 mg by mouth daily      amphetamine-dextroamphetamine (ADDERALL XR) 5 MG 24 hr capsule       clindamycin (CLINDAMAX) 1 % external gel        DULoxetine (CYMBALTA) 30 MG capsule Take 1 capsule by mouth daily      ibuprofen (ADVIL/MOTRIN) 200 MG tablet every 6 hours      ondansetron (ZOFRAN) 4 MG tablet Take 4 mg by mouth      prochlorperazine (COMPAZINE) 5 MG tablet Take 5 mg by mouth      propranolol (INDERAL) 10 MG tablet Take 10 mg by mouth as needed      Semaglutide-Weight Management (WEGOVY) 2.4 MG/0.75ML pen Inject 2.4 mg Subcutaneous      traZODone (DESYREL) 50 MG tablet Take 50 mg by mouth daily      vilazodone (VIIBRYD) 20 MG TABS tablet TAKE 1 TABLET BY MOUTH EVERY MORNING WITH BREAKFAST      Levomefolate Glucosamine (METHYL-FOLATE PO) Take 7.5 mg by mouth daily (Patient not taking: Reported on 7/11/2023)      omeprazole (PRILOSEC) 40 MG DR capsule Take 1 capsule (40 mg) by mouth daily (Patient not taking: Reported on 7/11/2023) 30 capsule 3        On examination, patient is alert and cooperative.  She is able to get up and walk around without assistive device.    HEENT is unremarkable.  Extraocular movements are intact.  Face is symmetric.  Tongue is midline.  Neck is supple.    She is able to move her upper extremities functionally.  She is able to carry out straight leg raising test bilaterally.  She is able to stand.  She is able to walk without assistive device or loss of balance.  She is able to walk on her heels and toes.  Romberg was negative.  Coordination tests were intact.    Musculoskeletal examination did not reveal focal areas of tenderness in the muscles of the neck and shoulder.     Neurological examination revealed good strength, normal sensation to touch and vibration.  Reflexes were equal and symmetric and plantars were downgoing..    Impression: This is a 33-year-old woman with a complex medical as well as psychosocial history.  She reports that she was doing reasonably well about a year ago and would like to get back to that state.  She has been doing physical therapy for close to a year and no longer plans to do  chiropractic due to the flareup.  She has been seen by ENT for some of this and has been on treatment for acid reflux.  She has been seen by neurology, neurosurgery and ophthalmology.  She is due for an EMG in the next week or 2.  She gets help from psychology and psychiatry for her anxiety and other issues.    At this point her examination does not clearly support any injection therapy.  In regard to physical therapy, she definitely gives flareups with chiropractic as well as possible flareup with physical therapy.  I therefore reinforced the idea of a home exercise program with walking.  She does swim but does not feel she has the access or the comfort to do it. She can continue Tylenol as she is. Not sure if a muscle relaxant is indicated at this point.  She has done weights in the past and it may be reasonable to resume it on a very gradual basis.  Depending on the findings of the EMG I suggested additional evaluation and treatment may be recommended by her providers.  In the meanwhile, I encouraged her to follow-up with her primary as well as psychology/psychiatry to help with her ongoing issues with anxiety and coping.  She verbalized understanding and did not have other additional questions at this point.    Anthony Brush MD

## 2023-08-01 NOTE — LETTER
8/1/2023       RE: Paula Back  [id number] [facility] [id number]     Dear Colleague,    Thank you for referring your patient, Paula Back, to the [facility] PHYSICAL MEDICINE AND REHABILITATION CLINIC [place] [facility]. Please see a copy of my visit note below.    CC: I am seeing this patient for consideration of injections and additional therapies.    Patient is a 33-year-old woman with a known history of Chiari malformation type I.  She has been evaluated by neurology and is taking Diamox.  She has been evaluated by neurosurgery and has had MRI of the cervical thoracic and lumbar spine.  She has a left disc osteophyte complex affecting left lateral recess with moderate to severe neuroforaminal stenosis at C4-5.  No syrinx noted thoracic and lumbar MRI without significant pathology.  She is due for an EMG in the next couple of weeks.    She does acknowledge that chiropractic had increased her symptoms with a flare and she is no longer planning to get chiropractic.  She has been doing physical therapy for close to a year and currently does it once every 2 weeks.  She also had a flareup in her neck symptoms with one of the therapy sessions though she was not being manipulated.    Patient drove from [place] today to attend this visit.  She is participating in a weeklong family With 50 other members including her , son and other family members.    She does acknowledge considerable stressors and losses in her life including the death of her father due to overdose this year and death of her aunt from suicide.  She also notes that her mother has drug issues as well.    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  She notes she has ergonomic set up and has bought some furniture on her own.  At home, she takes care of her  and her 4-year-old son and 2 cats.  Her son is 4 years and approximately 40 pounds and  she does occasionally carry him.  She denies tobacco or alcohol and other drugs.  She does take THC.    She reports doing some meditation and listens to relaxing music when she gets anxiety.  As mentioned previously she follows up with her psychiatrist on a regular basis who helps her with medications and she also sees a psychologist for talk therapy.  She plans to see them in the next few weeks.    Per chart review:   Past medical history  Pseudotumor cerebri in 2016 (resolved with 70 pound weight loss)  Arnold-Chiari malformation  Pelvic pain  Low-grade squamous intraepithelial lesion of the cervix  PTSD  Binge eating disorder  Depression/anxiety disorder  ADHD        Habits  Past smoker quarter pack of cigarettes per day  Alcohol 10/week  History of smoking some marijuana     Family history  Mother alcohol use/bipolar  Father alcohol use  Maternal grandfather liver cancer/diabetes  Maternal grandmother lung cancer  Paternal grandfather testicular cancer  Paternal grandmother heart disease/stroke  Brother anxiety/depression  Sister anxiety/depression           Work-up  MRI cervical spine 1/11/2023  C4-C5 moderate-severe left foraminal stenosis  C5-C6 moderate left foraminal stenosis, mild canal stenosis  1.  Unremarkable cervical cord.   2.  No high-grade spinal canal stenosis.   3.  At C4-C5, a left lateral/foraminal protrusion contributes to moderate-to-severe left neural foraminal stenosis.   4.  At C5-C6, there is moderate left neural foraminal stenosis.   5.  Additional degenerative changes see official report  6.  Cerebellar tonsillar ectopia 5-6 mm.  HEAD CTA: 2/24/2023  1.  Patent intracranial arterial vasculature without flow-limiting stenosis.   2.  No aneurysm.   NECK CTA: 2/24/2023  1.  Patent cervical arterial vasculature without flow-limiting stenosis.   2.  No evidence of dissection.  Video swallow and barium swallow March 2023                                               IMPRESSION:  1. Normal  swallow study.  2. Minimal dysmotility of the esophagus with mild escape from the  primary stripping wave which is cleared by secondary stripping waves  on one of the swallows. The other swallows demonstrated no escape from  the primary stripping wave.  3. Gastroesophageal reflux to the level of the clavicles was elicited  with provocative maneuvers.  4. No other abnormalities are identified. I discussed these findings  with the patient at time of exam.  5. Please see also speech pathology report for additional information  on the swallow portion of the study.     MRA head 3/19/2023  1.  Normal exam  MRA cervical vessels without and with contrast 3/19/2023  1.  Normal exam  MRI head with and without contrast 3/19/2023  1. No acute intracranial abnormality evident.   2. No intracranial mass or mass effect.       No abnormal intracranial enhancement. No focal parenchymal signal abnormality.   3. Cerebellar tonsillar ectopia with rounded tonsils protruding 8 mm beyond the foramen magnum unchanged compared to prior examination.       No hydrocephalus.       No additional findings to suggest intracranial hypotension.    MRI head 4/24/2023  1.  Redemonstrated cerebellar tonsillar ectopia measuring up to 1 cm below the foramen magnum.        This is again in a manner compatible with a Chiari I malformation.        CSF flow study demonstrates abundant flow ventral to the cervicomedullary junction,        with additionally preserved flow across the foramen of Magendie.        There is only a very small amount of flow dorsal to the cerebellar tonsils.  CTV head neck with contrast 5/23/2023  Patent dural venous sinuses and major deep intracranial veins.  RNFL 5/30/2023 normal bilaterally     No papilledema per Dr. Tarun Espinal.    Social History     Social History Narrative    Works at [email or web address] on 4:30-1 AM shift       Current Outpatient Medications   Medication Sig Dispense Refill    acetaminophen (TYLENOL) 325 MG  tablet Take 650 mg by mouth      acetaZOLAMIDE (DIAMOX) 250 MG tablet Take 1 tablet (250 mg) by mouth 4 times daily 120 tablet 11    amphetamine-dextroamphetamine (ADDERALL XR) 20 MG 24 hr capsule Take 20 mg by mouth daily      amphetamine-dextroamphetamine (ADDERALL XR) 5 MG 24 hr capsule       clindamycin (CLINDAMAX) 1 % external gel       DULoxetine (CYMBALTA) 30 MG capsule Take 1 capsule by mouth daily      ibuprofen (ADVIL/MOTRIN) 200 MG tablet every 6 hours      ondansetron (ZOFRAN) 4 MG tablet Take 4 mg by mouth      prochlorperazine (COMPAZINE) 5 MG tablet Take 5 mg by mouth      propranolol (INDERAL) 10 MG tablet Take 10 mg by mouth as needed      Semaglutide-Weight Management (WEGOVY) 2.4 MG/0.75ML pen Inject 2.4 mg Subcutaneous      traZODone (DESYREL) 50 MG tablet Take 50 mg by mouth daily      vilazodone (VIIBRYD) 20 MG TABS tablet TAKE 1 TABLET BY MOUTH EVERY MORNING WITH BREAKFAST      Levomefolate Glucosamine (METHYL-FOLATE PO) Take 7.5 mg by mouth daily (Patient not taking: Reported on 7/11/2023)      omeprazole (PRILOSEC) 40 MG DR capsule Take 1 capsule (40 mg) by mouth daily (Patient not taking: Reported on 7/11/2023) 30 capsule 3        On examination, patient is alert and cooperative.  She is able to get up and walk around without assistive device.    HEENT is unremarkable.  Extraocular movements are intact.  Face is symmetric.  Tongue is midline.  Neck is supple.    She is able to move her upper extremities functionally.  She is able to carry out straight leg raising test bilaterally.  She is able to stand.  She is able to walk without assistive device or loss of balance.  She is able to walk on her heels and toes.  Romberg was negative.  Coordination tests were intact.    Musculoskeletal examination did not reveal focal areas of tenderness in the muscles of the neck and shoulder.     Neurological examination revealed good strength, normal sensation to touch and vibration.  Reflexes were equal  and symmetric and plantars were downgoing..    Impression: This is a 33-year-old woman with a complex medical as well as psychosocial history.  She reports that she was doing reasonably well about a year ago and would like to get back to that state.  She has been doing physical therapy for close to a year and no longer plans to do chiropractic due to the flareup.  She has been seen by ENT for some of this and has been on treatment for acid reflux.  She has been seen by neurology, neurosurgery and ophthalmology.  She is due for an EMG in the next week or 2.  She gets help from psychology and psychiatry for her anxiety and other issues.    At this point her examination does not clearly support any injection therapy.  In regard to physical therapy, she definitely gives flareups with chiropractic as well as possible flareup with physical therapy.  I therefore reinforced the idea of a home exercise program with walking.  She does swim but does not feel she has the access or the comfort to do it. She can continue Tylenol as she is. Not sure if a muscle relaxant is indicated at this point.  She has done weights in the past and it may be reasonable to resume it on a very gradual basis.  Depending on the findings of the EMG I suggested additional evaluation and treatment may be recommended by her providers.  In the meanwhile, I encouraged her to follow-up with her primary as well as psychology/psychiatry to help with her ongoing issues with anxiety and coping.  She verbalized understanding and did not have other additional questions at this point.        Again, thank you for allowing me to participate in the care of your patient.      Sincerely,    Anthony Brush MD

## 2023-09-01 NOTE — PROGRESS NOTES
In person evaluation      [facility]  6/2/2023, in person consultation  9/5/2023, in person visit      33-year-old evaluated neurologically for:  Headaches  Pseudotumor cerebri question borderline  Arnold-Chiari malformation type I  Paresthesias  Chronic headache    Patient seen for the first time back in June 2023  Had a combination of possible pseudotumor cerebri/Arnold-Chiari malformation type I  Had chronic daily headaches and visual blurriness  Previous work-ups were equivocal about whether there is true papilledema    Concerned that she may have to have stiff ventricles and fluctuation in pressure due to the above  Placed empirically on Diamox 250 mg tablet  Patient found improvement with the treatment but did feel that she needed to escalate the dose fairly quickly  She was first treated with twice daily dosing,  She then increased her dose to 4 tablets/day, as she was seeing improvement  She then further added a midday dose    I did discuss that there is side effects of the medication we reviewed these with the patient  She needs to stay low hydrated  I prefer not to continue to escalate the dose so quickly  We talked about weight loss and how this can help    She also has some type of GERD symptoms with reflex that causes difficulty that her and her other physicians do not feel are related to GI symptoms but I would have them address that problem    Vision is better headaches are better with current dose medications of Diamox        Symptom history presentation: June 2023  Patient tells me that for at least a year she has been having difficulty  Palpitations/neck pain headaches  She had chiropractic manipulation in 2023 and things got a lot worse    Pressure in her head was a lot worse it was every day it was more on the right side behind her eye could go down to the jaw sometimes to be on the left side more of a burning sensation sometimes it would be at the back of the head at the base of the neck    Worse with  "standing or sitting without her head supported  Worse bending over    She says she did feel like she is going to blackout and her vision would go blurry sometimes that was worse at the end of the day  This sounds almost like an obscuration    Back in 2016 she was seen at the [facility] they never did an LP her papilledema did not look bad to them they did not think she had pseudotumor    Onset of symptoms mid February 2023 after chiropractic manipulation the neck.  Next day started having pain in her esophagus  Some difficulty with swallowing felt like she was being choked  Seen in the ER for dysphagia on 2/24/2023  Has been evaluated by ENT (swallow eval with speech pathology)  Barium swallow with significant gastroesophageal reflux, ENT felt there may have been some soft tissue injury from chiropractic manipulation that aggravated some of her swallowing difficulty  Developed headaches      Patient also had difficulty with \"dizziness\" and off-balance sensation for 6-9 months worse after chiropractic manipulation February 2023  He had head pressure/flushing/graying out of vision for a minute and makes her feel like she is deaf due to a head rush.    Patient with a pressure-like pain over the right eye and behind the eye  Also has a burning sensation over the left eye radiating to the top of the head    Headaches are daily  Last about 10 minutes    Currently on Midol complete daily    Shifting positions helps improve them  Coughing and some positions will make it worse  There is some discomfort in the midline neck  Has numbness feeling the sensation being pulled down over her shoulders    She also notes a mental fogginess    She does have some night sweats and temperature dysregulation  Some difficulty with swallowing    She initially was told by ophthalmology that she had level 1-2 papilledema both eyes  ([facility] eye specialist evaluated 3/15/2023 felt that patient had optic nerve edema OD greater than " "OS      A.  Pseudotumor cerebri 2016       Had optic nerve swelling       Back in 2016 she was seen at the [facility] they never did an LP,       her papilledema did not look bad to them they did not think she had pseudotumor       She initially was told by ophthalmology that she had level 1-2 papilledema both eyes       ([facility] eye specialist evaluated 3/15/2023 felt that patient had optic nerve edema OD greater than OS       \"Symptoms resolved after 70 pound weight loss\" 2017       \"Had incidental papilledema on eye exam\"        Saw [facility] they never did a spinal tap as her eyegrounds looked okay        Had some headaches but they were not so bad      B.  Arnold-Chiari malformation        MRI March 2023 8 mm cerebellar ectopia        Evaluated by neurosurgery several times       Initial 3/15/2023 question optic nerve edema OD greater than OS       Neuro-ophthalmology eval, RNFL 5/30/2023 normal bilaterally    C.  Paresthesias        More left-sided        B12 321, (2021)    D.  Under a lot of stress/anxiety        Maternal aunt who had pseudotumor cerebri recently killed herself May 2023        Increase stressors    E.   Significant GERD               Past medical history  Pseudotumor cerebri in 2016 (resolved with 70 pound weight loss)  Arnold-Chiari malformation  Pelvic pain  Low-grade squamous intraepithelial lesion of the cervix  PTSD  Binge eating disorder  Depression/anxiety disorder  ADHD      Habits  Past smoker quarter pack of cigarettes per day  Alcohol 10/week  History of smoking some marijuana    Family history  Mother alcohol use/bipolar  Father alcohol use  Maternal grandfather liver cancer/diabetes  Maternal grandmother lung cancer  Paternal grandfather testicular cancer  Paternal grandmother heart disease/stroke  Brother anxiety/depression  Sister anxiety/depression        Work-up  MRI cervical spine 1/11/2023  C4-C5 moderate-severe left foraminal stenosis  C5-C6 moderate left " foraminal stenosis, mild canal stenosis  1.  Unremarkable cervical cord.   2.  No high-grade spinal canal stenosis.   3.  At C4-C5, a left lateral/foraminal protrusion contributes to moderate-to-severe left neural foraminal stenosis.   4.  At C5-C6, there is moderate left neural foraminal stenosis.   5.  Additional degenerative changes see official report  6.  Cerebellar tonsillar ectopia 5-6 mm.  HEAD CTA: 2/24/2023  1.  Patent intracranial arterial vasculature without flow-limiting stenosis.   2.  No aneurysm.   NECK CTA: 2/24/2023  1.  Patent cervical arterial vasculature without flow-limiting stenosis.   2.  No evidence of dissection.  Video swallow and barium swallow March 2023                                               IMPRESSION:  1. Normal swallow study.  2. Minimal dysmotility of the esophagus with mild escape from the  primary stripping wave which is cleared by secondary stripping waves  on one of the swallows. The other swallows demonstrated no escape from  the primary stripping wave.  3. Gastroesophageal reflux to the level of the clavicles was elicited  with provocative maneuvers.  4. No other abnormalities are identified. I discussed these findings  with the patient at time of exam.  5. Please see also speech pathology report for additional information  on the swallow portion of the study.    MRA head 3/19/2023  1.  Normal exam  MRA cervical vessels without and with contrast 3/19/2023  1.  Normal exam  MRI head with and without contrast 3/19/2023  1. No acute intracranial abnormality evident.   2. No intracranial mass or mass effect.       No abnormal intracranial enhancement. No focal parenchymal signal abnormality.   3. Cerebellar tonsillar ectopia with rounded tonsils protruding 8 mm beyond the foramen magnum unchanged compared to prior examination.       No hydrocephalus.       No additional findings to suggest intracranial hypotension.    MRI head 4/24/2023  1.  Redemonstrated cerebellar  tonsillar ectopia measuring up to 1 cm below the foramen magnum.        This is again in a manner compatible with a Chiari I malformation.        CSF flow study demonstrates abundant flow ventral to the cervicomedullary junction,        with additionally preserved flow across the foramen of Magendie.        There is only a very small amount of flow dorsal to the cerebellar tonsils.  CTV head neck with contrast 5/23/2023  Patent dural venous sinuses and major deep intracranial veins.  RNFL 5/30/2023 normal bilaterally    Laboratory data review                 9/19/2023  NA/K       141/4.0  BUN/Cr    9/0.85  GLU         85  CL/CO2    112/18  B12           678        Exam    Review of systems  Neck and back pain some arm pain bilaterally  Numbness worse on the left side  Weakness worse on the left arm and leg with some difficulty walking  Poor balance  Has some bladder difficulties    Speech and swallow difficulties    Has occasional changes in her hearing and chronic ringing in the ears  Some obscurations when she bends over her decreased vision feels like she is going to blackout    Sleepy all the time    Headaches as above    Trouble with concentration and memory    Anxiety and depression  Stressors from a maternal and committing suicide    Chest pain and palpitations  Has some bloating and GERD    No actual double vision  No dysarthria    General exam  Blood pressure 107/68, pulse 101  HEENT normal  Alert and oriented  Lungs clear  Heart rate regular  Abdomen soft  Symmetrical pulses    Neurologic exam  Alert oriented x3  Normal prosody of speech  Normal naming  Normal comprehension  Normal repetition  No aphasia  No neglect  Memory recall at bedside testing okay    Cranials 2 through 12  No ophthalmoplegia  No nystagmus  Blind spot symmetrical and normal bilaterally  No papilledema on funduscopic exam  Pupils equal round reactive to light  Face symmetrical  Tongue twisters good  Palate in the midline    Upper  "extremities  No drift  No tremor  Rapid alternating movements symmetrical  Finger-nose okay    Lower extremities  Distal proximal strength good  No spasticity    Reflexes  Symmetrical in the upper and lower extremities  No Saavedra reflex  Toes downgoing    Sensory exam  Mild decreased temperature appreciation and vibration on the left arm and leg compared to the right    Gait  Romberg negative  Tandem gait okay  Gait normal      Neuro exam stable          Assessment/plan    1.  Arnold-Chiari malformation       MRI 3/20/2023       Cerebellar tonsillar ectopia with rounded tonsils protruding 8 mm beyond the foramen magnum unchanged compared to prior examination.        Varying amounts of cerebellar tonsil herniation but always mild      2.  Dysphagia/swallowing difficulty        Evaluated by ENT had formal swallow study        Minimal dysmotility of the esophagus with mild escape from the primary stripping wave which is cleared by secondary stripping waves on one of the swallows.         The other swallows demonstrated no escape from the primary stripping wave.           Gastroesophageal reflux to the level of the clavicles was elicited with provocative maneuvers.    3.   History of \"pseudotumor cerebri 2016\" lost weight        Chronic daily headache with fluctuating headaches and obscurations        Recent eyegrounds looked okay    4.   Significant stressors with the loss of her aunt        PTSD        Binge eating disorder        Depression/anxiety disorder        ADHD    Recommend  I am not sure that a lumbar puncture will help pressure might end up being normal at the time of the tap  I suspect that she may have fluctuating borderline increased ICP and is subtle and varies  Possibly this aggravates the Arnold-Chiari slightly    Diamox 250 mg tablet 2 tabs in the morning 1 tab midday 2 tabs at night (previously had some paresthesias with this medicine)  Patient's preference to increase this quickly    We discussed " "side effects and the purpose for good hydration to avoid kidney stones also.  Prefer not to continue increasing medication so quickly as will get into side effects and then have to stop the medicine.    Already has paresthesias check B12 level    Depression  Should see primary MD with close follow-up specially with the recent suicide death of her maternal aunt    Continue following with primary for weight loss  If they think gastric bypass is appropriate if current methods do not help then that may be something to explore in regards to general health    She will follow-up with neurosurgery after she has tried the Diamox  I do not want to speak for the neurosurgeons but I would suspect that they may want to hold off on any intervention at this point    Exhaustive review of studies notes and work-up above    Discussed at length with patient empiric treatment as above for \"borderline pseudotumor exacerbating her Arnold-Chiari malformation type I\"    Diamox refilled  Check electrolytes  Check B12 level  Follow-up with primary MD in regards to reflux  Other medical issues per primary MD    32 minutes total care time today    Follow-up spring of 2024    Addendum 9/27/2023  Laboratory data 9/19/2023  B12 678  Sodium 141 potassium 4.0 BUN 9 creatinine 0.85  Glucose 85  Chloride 112, CO2 18      "

## 2023-09-05 ENCOUNTER — OFFICE VISIT (OUTPATIENT)
Dept: NEUROLOGY | Facility: CLINIC | Age: 33
End: 2023-09-05
Payer: COMMERCIAL

## 2023-09-05 VITALS
DIASTOLIC BLOOD PRESSURE: 68 MMHG | BODY MASS INDEX: 42.69 KG/M2 | SYSTOLIC BLOOD PRESSURE: 107 MMHG | HEIGHT: 62 IN | WEIGHT: 232 LBS | HEART RATE: 101 BPM

## 2023-09-05 DIAGNOSIS — R20.2 PARESTHESIAS: ICD-10-CM

## 2023-09-05 DIAGNOSIS — H47.10 PAPILLEDEMA: ICD-10-CM

## 2023-09-05 DIAGNOSIS — G93.5 CHIARI MALFORMATION TYPE I (H): ICD-10-CM

## 2023-09-05 DIAGNOSIS — G93.2 PSEUDOTUMOR CEREBRI SYNDROME: Primary | ICD-10-CM

## 2023-09-05 PROCEDURE — 99214 OFFICE O/P EST MOD 30 MIN: CPT | Performed by: PSYCHIATRY & NEUROLOGY

## 2023-09-05 RX ORDER — ACETAZOLAMIDE 250 MG/1
TABLET ORAL
Qty: 150 TABLET | Refills: 11 | Status: SHIPPED | OUTPATIENT
Start: 2023-09-05 | End: 2024-03-05

## 2023-09-05 RX ORDER — TIZANIDINE 2 MG/1
1 TABLET ORAL
COMMUNITY
Start: 2023-07-10 | End: 2024-08-01

## 2023-09-05 NOTE — LETTER
9/5/2023         RE: Paula Back  [address] [facility] [address]        Dear Colleague,    Thank you for referring your patient, Paula Back, to the [facility] NEUROLOG[facility] [place]. Please see a copy of my visit note below.    In person evaluation      HPI  6/2/2023, in person consultation  9/5/2023, in person visit      33-year-old evaluated neurologically for:  Headaches  Pseudotumor cerebri question borderline  Arnold-Chiari malformation type I  Paresthesias  Chronic headache    Patient seen for the first time back in June 2023  Had a combination of possible pseudotumor cerebri/Arnold-Chiari malformation type I  Had chronic daily headaches and visual blurriness  Previous work-ups were equivocal about whether there is true papilledema    Concerned that she may have to have stiff ventricles and fluctuation in pressure due to the above  Placed empirically on Diamox 250 mg tablet  Patient found improvement with the treatment but did feel that she needed to escalate the dose fairly quickly  She was first treated with twice daily dosing,  She then increased her dose to 4 tablets/day, as she was seeing improvement  She then further added a midday dose    I did discuss that there is side effects of the medication we reviewed these with the patient  She needs to stay low hydrated  I prefer not to continue to escalate the dose so quickly  We talked about weight loss and how this can help    She also has some type of GERD symptoms with reflex that causes difficulty that her and her other physicians do not feel are related to GI symptoms but I would have them address that problem    Vision is better headaches are better with current dose medications of Diamox        Symptom history presentation: June 2023  Patient tells me that for at least a year she has been having difficulty  Palpitations/neck pain headaches  She had chiropractic manipulation in 2023 and things got a lot  "worse    Pressure in her head was a lot worse it was every day it was more on the right side behind her eye could go down to the jaw sometimes to be on the left side more of a burning sensation sometimes it would be at the back of the head at the base of the neck    Worse with standing or sitting without her head supported  Worse bending over    She says she did feel like she is going to blackout and her vision would go blurry sometimes that was worse at the end of the day  This sounds almost like an obscuration    Back in 2016 she was seen at the [facility] they never did an LP her papilledema did not look bad to them they did not think she had pseudotumor    Onset of symptoms mid February 2023 after chiropractic manipulation the neck.  Next day started having pain in her esophagus  Some difficulty with swallowing felt like she was being choked  Seen in the ER for dysphagia on 2/24/2023  Has been evaluated by ENT (swallow eval with speech pathology)  Barium swallow with significant gastroesophageal reflux, ENT felt there may have been some soft tissue injury from chiropractic manipulation that aggravated some of her swallowing difficulty  Developed headaches      Patient also had difficulty with \"dizziness\" and off-balance sensation for 6-9 months worse after chiropractic manipulation February 2023  He had head pressure/flushing/graying out of vision for a minute and makes her feel like she is deaf due to a head rush.    Patient with a pressure-like pain over the right eye and behind the eye  Also has a burning sensation over the left eye radiating to the top of the head    Headaches are daily  Last about 10 minutes    Currently on Midol complete daily    Shifting positions helps improve them  Coughing and some positions will make it worse  There is some discomfort in the midline neck  Has numbness feeling the sensation being pulled down over her shoulders    She also notes a mental fogginess    She does have some " "night sweats and temperature dysregulation  Some difficulty with swallowing    She initially was told by ophthalmology that she had level 1-2 papilledema both eyes  ([facility] specialist evaluated 3/15/2023 felt that patient had optic nerve edema OD greater than OS      A.  Pseudotumor cerebri 2016       Had optic nerve swelling       Back in 2016 she was seen at the [facility] they never did an LP,       her papilledema did not look bad to them they did not think she had pseudotumor       She initially was told by ophthalmology that she had level 1-2 papilledema both eyes       ([facility] eye specialist evaluated 3/15/2023 felt that patient had optic nerve edema OD greater than OS       \"Symptoms resolved after 70 pound weight loss\" 2017       \"Had incidental papilledema on eye exam\"        Saw [facility] they never did a spinal tap as her eyegrounds looked okay        Had some headaches but they were not so bad      B.  Arnold-Chiari malformation        MRI March 2023 8 mm cerebellar ectopia        Evaluated by neurosurgery several times       Initial 3/15/2023 question optic nerve edema OD greater than OS       Neuro-ophthalmology eval, RNFL 5/30/2023 normal bilaterally    C.  Paresthesias        More left-sided        B12 321, (2021)    D.  Under a lot of stress/anxiety        Maternal aunt who had pseudotumor cerebri recently killed herself May 2023        Increase stressors    E.   Significant GERD               Past medical history  Pseudotumor cerebri in 2016 (resolved with 70 pound weight loss)  Arnold-Chiari malformation  Pelvic pain  Low-grade squamous intraepithelial lesion of the cervix  PTSD  Binge eating disorder  Depression/anxiety disorder  ADHD      Habits  Past smoker quarter pack of cigarettes per day  Alcohol 10/week  History of smoking some marijuana    Family history  Mother alcohol use/bipolar  Father alcohol use  Maternal grandfather liver cancer/diabetes  Maternal " grandmother lung cancer  Paternal grandfather testicular cancer  Paternal grandmother heart disease/stroke  Brother anxiety/depression  Sister anxiety/depression        Work-up  MRI cervical spine 1/11/2023  C4-C5 moderate-severe left foraminal stenosis  C5-C6 moderate left foraminal stenosis, mild canal stenosis  1.  Unremarkable cervical cord.   2.  No high-grade spinal canal stenosis.   3.  At C4-C5, a left lateral/foraminal protrusion contributes to moderate-to-severe left neural foraminal stenosis.   4.  At C5-C6, there is moderate left neural foraminal stenosis.   5.  Additional degenerative changes see official report  6.  Cerebellar tonsillar ectopia 5-6 mm.  HEAD CTA: 2/24/2023  1.  Patent intracranial arterial vasculature without flow-limiting stenosis.   2.  No aneurysm.   NECK CTA: 2/24/2023  1.  Patent cervical arterial vasculature without flow-limiting stenosis.   2.  No evidence of dissection.  Video swallow and barium swallow March 2023                                               IMPRESSION:  1. Normal swallow study.  2. Minimal dysmotility of the esophagus with mild escape from the  primary stripping wave which is cleared by secondary stripping waves  on one of the swallows. The other swallows demonstrated no escape from  the primary stripping wave.  3. Gastroesophageal reflux to the level of the clavicles was elicited  with provocative maneuvers.  4. No other abnormalities are identified. I discussed these findings  with the patient at time of exam.  5. Please see also speech pathology report for additional information  on the swallow portion of the study.    MRA head 3/19/2023  1.  Normal exam  MRA cervical vessels without and with contrast 3/19/2023  1.  Normal exam  MRI head with and without contrast 3/19/2023  1. No acute intracranial abnormality evident.   2. No intracranial mass or mass effect.       No abnormal intracranial enhancement. No focal parenchymal signal abnormality.   3.  Cerebellar tonsillar ectopia with rounded tonsils protruding 8 mm beyond the foramen magnum unchanged compared to prior examination.       No hydrocephalus.       No additional findings to suggest intracranial hypotension.    MRI head 4/24/2023  1.  Redemonstrated cerebellar tonsillar ectopia measuring up to 1 cm below the foramen magnum.        This is again in a manner compatible with a Chiari I malformation.        CSF flow study demonstrates abundant flow ventral to the cervicomedullary junction,        with additionally preserved flow across the foramen of Magendie.        There is only a very small amount of flow dorsal to the cerebellar tonsils.  CTV head neck with contrast 5/23/2023  Patent dural venous sinuses and major deep intracranial veins.  RNFL 5/30/2023 normal bilaterally      Exam    Review of systems  Neck and back pain some arm pain bilaterally  Numbness worse on the left side  Weakness worse on the left arm and leg with some difficulty walking  Poor balance  Has some bladder difficulties    Speech and swallow difficulties    Has occasional changes in her hearing and chronic ringing in the ears  Some obscurations when she bends over her decreased vision feels like she is going to blackout    Sleepy all the time    Headaches as above    Trouble with concentration and memory    Anxiety and depression  Stressors from a maternal and committing suicide    Chest pain and palpitations  Has some bloating and GERD    No actual double vision  No dysarthria    General exam  Blood pressure 107/68, pulse 101  HEENT normal  Alert and oriented  Lungs clear  Heart rate regular  Abdomen soft  Symmetrical pulses    Neurologic exam  Alert oriented x3  Normal prosody of speech  Normal naming  Normal comprehension  Normal repetition  No aphasia  No neglect  Memory recall at bedside testing okay    Cranials 2 through 12  No ophthalmoplegia  No nystagmus  Blind spot symmetrical and normal bilaterally  No papilledema on  "funduscopic exam  Pupils equal round reactive to light  Face symmetrical  Tongue twisters good  Palate in the midline    Upper extremities  No drift  No tremor  Rapid alternating movements symmetrical  Finger-nose okay    Lower extremities  Distal proximal strength good  No spasticity    Reflexes  Symmetrical in the upper and lower extremities  No Saavedra reflex  Toes downgoing    Sensory exam  Mild decreased temperature appreciation and vibration on the left arm and leg compared to the right    Gait  Romberg negative  Tandem gait okay  Gait normal      Neuro exam stable          Assessment/plan    1.  Arnold-Chiari malformation       MRI 3/20/2023       Cerebellar tonsillar ectopia with rounded tonsils protruding 8 mm beyond the foramen magnum unchanged compared to prior examination.        Varying amounts of cerebellar tonsil herniation but always mild      2.  Dysphagia/swallowing difficulty        Evaluated by ENT had formal swallow study        Minimal dysmotility of the esophagus with mild escape from the primary stripping wave which is cleared by secondary stripping waves on one of the swallows.         The other swallows demonstrated no escape from the primary stripping wave.           Gastroesophageal reflux to the level of the clavicles was elicited with provocative maneuvers.    3.   History of \"pseudotumor cerebri 2016\" lost weight        Chronic daily headache with fluctuating headaches and obscurations        Recent eyegrounds looked okay    4.   Significant stressors with the loss of her aunt        PTSD        Binge eating disorder        Depression/anxiety disorder        ADHD    Recommend  I am not sure that a lumbar puncture will help pressure might end up being normal at the time of the tap  I suspect that she may have fluctuating borderline increased ICP and is subtle and varies  Possibly this aggravates the Arnold-Chiari slightly    Diamox 250 mg tablet 2 tabs in the morning 1 tab midday 2 tabs " "at night (previously had some paresthesias with this medicine)  Patient's preference to increase this quickly    We discussed side effects and the purpose for good hydration to avoid kidney stones also.  Prefer not to continue increasing medication so quickly as will get into side effects and then have to stop the medicine.    Already has paresthesias check B12 level    Depression  Should see primary MD with close follow-up specially with the recent suicide death of her maternal aunt    Continue following with primary for weight loss  If they think gastric bypass is appropriate if current methods do not help then that may be something to explore in regards to general health    She will follow-up with neurosurgery after she has tried the Diamox  I do not want to speak for the neurosurgeons but I would suspect that they may want to hold off on any intervention at this point    Exhaustive review of studies notes and work-up above    Discussed at length with patient empiric treatment as above for \"borderline pseudotumor exacerbating her Arnold-Chiari malformation type I\"    Diamox refilled  Check electrolytes  Check B12 level  Follow-up with primary MD in regards to reflux  Other medical issues per primary MD    32 minutes total care time today    Follow-up spring of 2024          Again, thank you for allowing me to participate in the care of your patient.        Sincerely,        divya Bautista MD  "

## 2023-09-05 NOTE — NURSING NOTE
Chief Complaint   Patient presents with    Pseudotumor cerebri syndrome     3 month follow up. Head pressure is better. Vision improved.     Dottie Benson LPN on 9/5/2023 at 9:42 AM

## 2023-09-05 NOTE — LETTER
September 27, 2023      Paulacoral Back  [address] [facility] [address]        Dear ,    We are writing to inform you of your test results.    Laboratory data 9/19/2023  B12 678  Sodium 141 potassium 4.0 BUN 9 creatinine 0.85  Glucose 85  Chloride 112, CO2 18    B12 level and labs above are okay  Elevated chloride and low CO2 is from the Diamox    Could consider taking 1 less tablet of Diamox if headache symptoms are stable  Stay well-hydrated  Keep follow-up as planned 3/5/2024    If you have any questions or concerns, please call the clinic at the number listed above.       Sincerely,    Dr. Brandon Bautista

## 2023-10-09 DIAGNOSIS — F41.9 ANXIETY: Primary | ICD-10-CM

## 2023-10-09 NOTE — CONFIDENTIAL NOTE
Patient requesting treatment prior to EMG for anxiety.  She has taken a single does of Valium or Ativan in the past prior to procedures and this has been helpful for her.  I did look up if the EMG test would be affected by taking Valium and see that while a MR would interfere potentially with the results, Valium will not.  I will go ahead and prescribe Valium.      Marissa Wang PA-C  Department of Neurosurgery  Office: [phone]

## 2023-10-10 RX ORDER — DIAZEPAM 5 MG
TABLET ORAL
Qty: 1 TABLET | Refills: 0 | Status: SHIPPED | OUTPATIENT
Start: 2023-10-10 | End: 2024-08-01

## 2023-10-11 ENCOUNTER — OFFICE VISIT (OUTPATIENT)
Dept: NEUROLOGY | Facility: CLINIC | Age: 33
End: 2023-10-11
Attending: PHYSICIAN ASSISTANT
Payer: COMMERCIAL

## 2023-10-11 DIAGNOSIS — R29.898 LEFT ARM WEAKNESS: ICD-10-CM

## 2023-10-11 DIAGNOSIS — M50.121 CERVICAL DISC DISORDER AT C4-C5 LEVEL WITH RADICULOPATHY: Primary | ICD-10-CM

## 2023-10-11 DIAGNOSIS — R20.0 LEFT SIDED NUMBNESS: ICD-10-CM

## 2023-10-11 PROCEDURE — 95911 NRV CNDJ TEST 9-10 STUDIES: CPT | Performed by: PSYCHIATRY & NEUROLOGY

## 2023-10-11 PROCEDURE — 95886 MUSC TEST DONE W/N TEST COMP: CPT | Mod: RT | Performed by: PSYCHIATRY & NEUROLOGY

## 2023-10-11 NOTE — PROCEDURES
[facility]     [facility]ly [facility]  [address], [address]  [place] [address]  Tel: [phone]  Fax: [phone]          Full Name: Paula Back Gender: Female  Patient ID: [id number] YOB: 1990      Visit Date: 10/11/2023 13:11  Age: 33 Years 2 Months Old  Interpreted By: Brandon Bautista M.D.   Ref Dr.: Marissa Wang NP  Tech:    Height: 5 feet 2 inch  Reason for referral: Evaluate right upper/left lower. c/o pain in right side of neck > 8 months. Pain, weakness in left hip > 10 years.       Motor NCS      Nerve / Sites Lat Amp Dist Varghese    ms mV cm m/s   R Median - APB      Wrist 3.33 13.2 7       Elbow 6.93 13.3 23 64   R Ulnar - ADM      Wrist 2.66 12.9 7       B.Elbow 5.42 12.9 19 69      A.Elbow 7.29 12.8 14 75   L Peroneal - EDB      Ankle 4.06 6.2 8       Fib head 9.38 5.6 24 45      Pop fossa 11.51 5.1 11 52   L Tibial - AH      Ankle 4.01 11.4 8       Pop fossa 11.51 10.9 41 55       F  Wave      Nerve Fmin    ms   R Ulnar - ADM 25.21   L Tibial - AH 44.58       Sensory NCS      Nerve / Sites Pk Lat Amp.1-2 Dist Lat Diff    ms  V cm ms   R Median - II (Antidr)      Wrist 2.92 75.3 13    R Ulnar - V (Antidr)      Wrist 2.55 22.0 11    R Median, Ulnar - Transcarpal comparison      Median Palm 1.82 176.0 8       Ulnar Palm 1.72 67.8 8        0.10   L Sural - Ankle (Calf)      Calf 2.92 13.4 14    L Superficial peroneal - Ankle      Lat leg 3.13 19.2 12        EMG Summary Table     Spontaneous MUAP Rcmt Note   Muscle Fib PSW Fasc IA # Amp Dur PPP Rate Type   R. Brachioradialis None None None N N N N N N N   R. Pronator teres None None None N N N N N N N   R. Biceps brachii None None None N N N N N N N   R. Deltoid None None None N N N N N N N   R. Triceps brachii None None None N N N N N N N   R. Flexor carpi ulnaris None None None N N N N N N N   R. First dorsal interosseous None None None N N N N N N N   R. Abductor  pollicis brevis None None None N N N N N N N   L. Gluteus medius None None None N N N N N N N   L. Gluteus guillermo None None None N N N N N N N   L. Upper paraspinal None None None N N N N N N N   L. MIddle paraspinal None None None N N N N N N N   L. Lower paraspinal None None None N N N N N N N   L. Adductor mainor None None None N N N N N N N   L. Quadriceps None None None N N N N N N N   L. Tibialis anterior None None None N N N N N N N   L. Gastrocnemius (Medial head) None None None N N N N N N N       Right median motor conduction Study is normal  Right ulnar motor conduction study is normal  Right ulnar F wave latency is normal    Right median snap potential normal  Right ulnar snap potential normal  Right median palmar latency normal  Right ulnar palmar latency normal    Left peroneal motor conduction Study normal  Left tibial motor conduction study normal  Left tibial F wave latency normal    Left sural snap potential normal  Left superficial peroneal snap potential normal    Needle examination right upper extremity no active or chronic denervation, normal study  Needle examination left lower extremity no active or chronic denervation, normal study    Impression  1.  Normal EMG of the right upper extremity  2.  Normal EMG of the left lower extremity

## 2023-10-11 NOTE — LETTER
10/11/2023         RE: Paula Back  [address] [facility] [address]        Dear Colleague,    Thank you for referring your patient, Paula Back, to the [facility] NEUROLOGY [facility]IC [place]. Please see a copy of my visit note below.    See EMG report      Again, thank you for allowing me to participate in the care of your patient.        Sincerely,        divya Bautista MD

## 2023-11-02 ENCOUNTER — TRANSCRIBE ORDERS (OUTPATIENT)
Dept: OTHER | Age: 33
End: 2023-11-02

## 2023-11-02 ENCOUNTER — THERAPY VISIT (OUTPATIENT)
Dept: PHYSICAL THERAPY | Facility: CLINIC | Age: 33
End: 2023-11-02
Attending: NURSE PRACTITIONER
Payer: COMMERCIAL

## 2023-11-02 DIAGNOSIS — Q07.00 ARNOLD-CHIARI MALFORMATION (H): Primary | ICD-10-CM

## 2023-11-02 DIAGNOSIS — G93.5 CHIARI MALFORMATION TYPE I (H): Primary | ICD-10-CM

## 2023-11-02 PROCEDURE — 97163 PT EVAL HIGH COMPLEX 45 MIN: CPT | Mod: GP | Performed by: PHYSICAL THERAPIST

## 2023-11-02 NOTE — PROGRESS NOTES
"PHYSICAL THERAPY EVALUATION  Type of Visit: Evaluation    See electronic medical record for Abuse and Falls Screening details.    Subjective   Patient has had multiple symptoms since 2017. She has been diagnosed with Chiari Malformation, papilledema, and possible pseudotumor cerebri syndrome. Patient has been told that she is having possible issues with CSF flow. 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 Cerebellar tonsillar ectopia 5-8mm. Patient went to a chiropractor for her multiple sx's Feb 2023 at which time had a manipulation of her neck and shortly after this looked up and felt a snap on the right side of her throat. Patient is also struggling with LBP and LLE paraesthesias. Symptoms include: pressure behing the right eye #3, pressure in the head #3, HA #1, neck pain #4, LBP #4, L knee pain posterior #5-6, tingling in the left toes, upper abdominal pain with sitting 2 hours or more, weakness left LE, balance issues LLE, difficulty swallowing, and occasional \"black out\" sx upon standing in the PM (this is a little better since the med Diamox).    Presenting condition or subjective complaint: Chiari, Cervical disc issues, right side neck/shoulder/face/throat pain, left sided weakness/hip and leg pain, abdominal pain  Date of onset:  11/15/17    Relevant medical history: Chest pain; Dizziness; Foot drop; Hearing problems; Migraines or headaches; Neck injury; Numbness or tingling in perianal area; Overweight; Pain at night or rest; Progressive neurological deficits; Significant weakness; Vision problems   Dates & types of surgery: Dec 2017 - Lasik / Oct 2022 - Bilateral Salpingectomy    Prior diagnostic imaging/testing results: MRI; CT scan; EMG; Other Esophogram   Prior therapy history for the same diagnosis, illness or injury: Yes General physical therapy throughout past 10 yrs, most recent July - Dec 2022 & scattered 2023 visits      Living Environment  Social support: With a " significant other or spouse   Type of home: House; 2-story   Stairs to enter the home: Yes 1 Is there a railing: No   Ramp: No   Stairs inside the home: Yes 7 Is there a railing: Yes   Help at home: None  Equipment owned: Walker with wheels     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   Hobbies/Interests: Hiking, Volunteering, Spending time w/ friends, Activities with son, shopping    Patient goals for therapy: Bathe/Pick-up my son, household chores, be mobile for extended lengths of time, hike, sit in work chair    Pain assessment:  see subjective.      Objective   Cognitive Status Examination  Orientation: Oriented to person, place and time   Level of Consciousness: Alert  Follows Commands and Answers Questions: 100% of the time  Personal Safety and Judgement: Intact  Memory: Intact    OBSERVATION: Patient is very tearful as she is telling PT her story, much frustration that she has not gotten much help for all her sx's.     POSTURE:  Forward head and rounded shoulders  PALPATION: moderate tightness in the neck, upper back and OCB  RANGE OF MOTION:  Neck: FF WNL, ext 20 degrees (pulls in the throat), rotation right 70%, rotation left WNL, SB bilateral 70%. UE ROM WNL.   STRENGTH:  Bilateral shoulder strength 4-/5.  RLE WNL. LLE: DF 4-, knee ext 4-, left hip 4.     GAIT:   Patient complains of off and on foot drop and weakness left     BALANCE:  SLS right 15 sec, SLS left 4 sec      Assessment & Plan   CLINICAL IMPRESSIONS  Medical Diagnosis: Chiari malformation    Treatment Diagnosis: neck pain, pressure in the head, HA, LBP, LLE weakness, balance issues, throat tightness, upper abd pain with sititng   Impression/Assessment:  Patient has multiple complaints that are multi factorial. Please see subjective and the goals.   Patients neck and head  sx's are a combination of the neck degeneration and the Chiari. Seems LBP and referral pain is discal in nature. Along with balance issues, weakness of the LLE.     Clinical Decision Making (Complexity):  Clinical Presentation: Evolving/Changing  Clinical Presentation Rationale: based on medical and personal factors listed in PT evaluation  Clinical Decision Making (Complexity): High complexity    PLAN OF CARE  Treatment Interventions:  Modalities: E-stim  Interventions: Gait Training, Manual Therapy, Neuromuscular Re-education, Therapeutic Activity, Therapeutic Exercise, Self-Care/Home Management    Long Term Goals     PT Goal 1  Goal Identifier: 1  Goal Description: Patient no longer has neck pain and has full AROM of the neck, no pulling. The full painfree AROM will assist her in her ability to lift her son with no symptoms.  Rationale: to maximize safety and independence with performance of ADLs and functional tasks  Target Date: 01/30/24  PT Goal 2  Goal Identifier: 2  Goal Description: Patient no longer has pressure in her head so she can perform all ADL;s and her work without getting distracted by the head sx's  Rationale: to maximize safety and independence with performance of ADLs and functional tasks  Target Date: 01/30/24  PT Goal 3  Goal Identifier: 3  Goal Description: Patient no longer has LBP and LLE pain, along with normal strength so pt can play and move around her environment with her son.  Rationale: to maximize safety and independence with performance of ADLs and functional tasks  Target Date: 01/30/24  PT Goal 4  Goal Identifier: 4  Goal Description: Patient is able to tolerate sitting/standing 1-3 hours for her work so she can sit/stand vs laying to do her job.  Rationale: to maximize safety and independence with performance of ADLs and functional tasks  Target Date: 01/30/24      Frequency of Treatment: 1-2x per wk  Duration of Treatment: 90 days      Education Assessment:   Learner/Method:  Patient;Listening;Reading;Demonstration    Risks and benefits of evaluation/treatment have been explained.   Patient/Family/caregiver agrees with Plan of Care.     Evaluation Time:     PT Eval, High Complexity Minutes (97327): 60       Signing Clinician: Alicia Augustin PT

## 2023-11-09 ENCOUNTER — THERAPY VISIT (OUTPATIENT)
Dept: PHYSICAL THERAPY | Facility: CLINIC | Age: 33
End: 2023-11-09
Attending: NURSE PRACTITIONER
Payer: COMMERCIAL

## 2023-11-09 DIAGNOSIS — G93.5 CHIARI MALFORMATION TYPE I (H): Primary | ICD-10-CM

## 2023-11-09 PROCEDURE — 97140 MANUAL THERAPY 1/> REGIONS: CPT | Mod: GP | Performed by: PHYSICAL THERAPIST

## 2023-11-15 ENCOUNTER — THERAPY VISIT (OUTPATIENT)
Dept: PHYSICAL THERAPY | Facility: CLINIC | Age: 33
End: 2023-11-15
Attending: NURSE PRACTITIONER
Payer: COMMERCIAL

## 2023-11-15 DIAGNOSIS — G93.5 CHIARI MALFORMATION TYPE I (H): Primary | ICD-10-CM

## 2023-11-15 PROCEDURE — 97140 MANUAL THERAPY 1/> REGIONS: CPT | Mod: GP | Performed by: PHYSICAL THERAPIST

## 2023-12-12 ENCOUNTER — OFFICE VISIT (OUTPATIENT)
Dept: NEUROSURGERY | Facility: CLINIC | Age: 33
End: 2023-12-12
Attending: NEUROLOGICAL SURGERY
Payer: COMMERCIAL

## 2023-12-12 VITALS — BODY MASS INDEX: 41.79 KG/M2 | WEIGHT: 227.07 LBS | HEIGHT: 62 IN

## 2023-12-12 DIAGNOSIS — G93.5 CHIARI MALFORMATION TYPE I (H): Primary | ICD-10-CM

## 2023-12-12 PROCEDURE — 99213 OFFICE O/P EST LOW 20 MIN: CPT | Performed by: NEUROLOGICAL SURGERY

## 2023-12-12 RX ORDER — METHOCARBAMOL 500 MG/1
TABLET, FILM COATED ORAL
COMMUNITY
Start: 2023-11-30 | End: 2024-04-01

## 2023-12-12 NOTE — PATIENT INSTRUCTIONS
For Patient Education Materials:  z.[facility]/alonso       [facility] Pediatric Rheumatology    For Help:  The Pediatric Call Center at [phone] can help with scheduling of routine follow up visits.  Teri Fung and Mary Evans are the Nurse Coordinators for the Division of Pediatric Rheumatology and can be reached by phone at [phone] or through [employer] [email or web address]). They can help with questions about your child s rheumatic condition, medications, and test results.  For emergencies after hours or on the weekends, please call the page  at [phone] and ask to speak to the physician on-call for Pediatric Rheumatology. Please do not use [employer] for urgent requests.  Main  Services:  [phone]  Hmong/Estonian/Gambian: [phone]  South Sudanese: [phone]  Palauan: [phone]    Internal Referrals: If we refer your child to another physician/team within [facility], you should receive a call to set this up. If you do not hear anything within a week, please call the Call Center at [phone].    External Referrals: If we refer your child to a physician/team outside of [facility], our team will send the referral order and relevant records to them. We ask that you call the place where your child is being referred to ensure they received the needed information and notify our team coordinators if not.    Imaging: If your child needs an imaging study that is not being performed the day of your clinic appointment, please call to set this up. For xrays, ultrasounds, and echocardiogram call [phone]. For CT or MRI call [phone].     MyChart: We encourage you to sign up for [employer] at [email or web address]. For assistance or questions, call [phone]. If your child is 12 years or older, a consent for proxy/parent access needs to be signed so please discuss this with your physician at the next visit.

## 2023-12-12 NOTE — NURSING NOTE
"Chief Complaint   Patient presents with    RECHECK       Vitals:    12/12/23 1616   Weight: 227 lb 1.2 oz (103 kg)   Height: 5' 2.32\" (158.3 cm)   HC: 58 cm (22.84\")       Patient MyChart Active? Yes  If no, would they like to sign up? N/A    Has patient signed a Consent to Communicate form to discuss health information with guardians if applicable? Does not apply     Does patient need PHQ-2 completed today? No    Depression Response    Patient completed the PHQ-9 assessment for depression and scored >9? Does not apply   Question 9 on the PHQ-9 was positive for suicidality? Does not apply   Does patient have current mental health provider? Does not apply     I personally notified the following:      Aleyda Rouse  December 12, 2023  "

## 2023-12-12 NOTE — LETTER
12/12/2023      RE: Paula Back  [id number] [address] [id number]     Dear Colleague,    Thank you for the opportunity to participate in the care of your patient, Paula Back, at the [facility] at [facility]. Please see a copy of my visit note below.    It was a pleasure seeing Paula in neurosurgery clinic today.  This is a pleasant 33 year old who I last saw 5/23/2023.  She presented to us initially with reported finding of papilledema and headaches.  It sounds like she had been having the symptoms since 2017, with papilledema reported that time, and placed on Diamox.  She also complained of blackout spells.  Her headaches are unusual, and are not classic for Chiari type headaches.  Her MRI scan revealed descent of cerebellar tonsils about 8 mm below the basion of his skin line, with a rounded appearance and no significant crowding.  To further evaluate for evidence of intracranial hypertension, we sent her for evaluation by ophthalmology and no papilledema was noted. She has not had an LP.      She is here today with several changes and concerns for CSF leaking as well as nerves eroding into her oropharynx.  She reports that she is not doing well in [employer].  She sees a chiropractor and has been having neck pain.  She did hear a crack after a long manual adjustment of her neck.  She has also  been having pain along her face and in the back of her jaw.  This is associated with pressure pain behind her eye.  She also complains of tinnitus,.  Most of her symptoms occur on the right side of her face but sometimes on the left side.  She has been having problems in her oropharynx and noted something white in her mucus that she feels is a nerve that popped out.  He has seen ENT and was told she suffers from GERD but they did not feel she had an exposed nerve within her oropharynx.  She was evaluated by Dr. Espinal  in ophthalmology in the past and there was no papilledema noted on exam.  She was discharged from clinic with no follow-up.  She has been prescribed, she believes by her neurologist, Diamox to take 1000 g/day due to symptoms of IIH.  She has not had a lumbar puncture to document high opening pressure and I cannot find a note clearly documenting papilledema  She does feel her symptoms change throughout the day.  When she is totally flat she has worsened problems including trouble swallowing and chewing.  The pain on the right side of her face is exacerbated by cold things and light touch.    On exam, she is well-appearing in general. Her face is symmetric, pupils are equal and reactive.  Extraocular movement's are full.  I am unable to see any evidence of papilledema on nondilated exam in dark room.  Tongue is midline.  I do not see any evidence of an exposed nerve in her oropharynx, lacerations or openings within her mucosa.  No pronator drift. Muscle bulk and tone is normal.  Strength is full.  Station gait are within normal limits.    She did not obtain any imaging studies today. We did review her most recent brain, and total spine MRI scans obtained on 4/24/2023.  Brain MR demonstrated descent of the cerebellar tonsils about 8mm below the basion opisthion line .  Flow on cine cardiac gated phase-contrast MRI showed good flow. Spine MRI scan was within normal notes.    Impression: Chiari malformation, likely incidental, question of IIH with no clear documentation of intracranial hypertension or papilledema, on diamox, other pain symptoms of unclear etiology    Plan: Think she is having symptoms from her during operation.  She does have constellation of unusual symptoms.  Given her facial pain oral concerns, I do think she would benefit from a significant pain clinic.  Most of her pain is worse with light touch and cold on her face as well as joint.  Will continue to follow her here outpatient and would obtain an  MRI scan 1 year on the left she has any symptoms from this.      Please do not hesitate to contact me if you have any questions/concerns.     Sincerely,       Micheal Olivares MD

## 2023-12-19 ENCOUNTER — TELEPHONE (OUTPATIENT)
Dept: NEUROSURGERY | Facility: CLINIC | Age: 33
End: 2023-12-19
Payer: COMMERCIAL

## 2023-12-19 NOTE — PROGRESS NOTES
It was a pleasure seeing Paula in neurosurgery clinic today.  This is a pleasant 33 year old who I last saw 5/23/2023.  She presented to us initially with reported finding of papilledema and headaches.  It sounds like she had been having the symptoms since 2017, with papilledema reported that time, and placed on Diamox.  She also complained of blackout spells.  Her headaches are unusual, and are not classic for Chiari type headaches.  Her MRI scan revealed descent of cerebellar tonsils about 8 mm below the basion of his skin line, with a rounded appearance and no significant crowding.  To further evaluate for evidence of intracranial hypertension, we sent her for evaluation by ophthalmology and no papilledema was noted. She has not had an LP.      She is here today with several changes and concerns for CSF leaking as well as nerves eroding into her oropharynx.  [facility]  She sees a chiropractor and has been having neck pain.  She did hear a crack after a long manual adjustment of her neck.  She has also  been having pain along her face and in the back of her jaw.  This is associated with pressure pain behind her eye.  She also complains of tinnitus,.  Most of her symptoms occur on the right side of her face but sometimes on the left side.  She has been having problems in her oropharynx and noted something white in her mucus that she feels is a nerve that popped out.  He has seen ENT and was told she suffers from GERD but they did not feel she had an exposed nerve within her oropharynx.  She was evaluated by Dr. Espinal in ophthalmology in the past and there was no papilledema noted on exam.  She was discharged from clinic with no follow-up.  She has been prescribed, she believes by her neurologist, Diamox to take 1000 g/day due to symptoms of IIH.  She has not had a lumbar puncture to document high opening pressure and I cannot find a note clearly documenting papilledema  She  does feel her symptoms change throughout the day.  When she is totally flat she has worsened problems including trouble swallowing and chewing.  The pain on the right side of her face is exacerbated by cold things and light touch.    On exam, she is well-appearing in general. Her face is symmetric, pupils are equal and reactive.  Extraocular movement's are full.  I am unable to see any evidence of papilledema on nondilated exam in dark room.  Tongue is midline.  I do not see any evidence of an exposed nerve in her oropharynx, lacerations or openings within her mucosa.  No pronator drift. Muscle bulk and tone is normal.  Strength is full.  Station gait are within normal limits.    She did not obtain any imaging studies today. We did review her most recent brain, and total spine MRI scans obtained on 4/24/2023.  Brain MR demonstrated descent of the cerebellar tonsils about 8mm below the basion opisthion line .  Flow on cine cardiac gated phase-contrast MRI showed good flow. Spine MRI scan was within normal notes.    Impression: Chiari malformation, likely incidental, question of IIH with no clear documentation of intracranial hypertension or papilledema, on diamox, other pain symptoms of unclear etiology    Plan: Think she is having symptoms from her during operation.  She does have constellation of unusual symptoms.  Given her facial pain oral concerns, I do think she would benefit from a significant pain clinic.  Most of her pain is worse with light touch and cold on her face as well as joint.  Will continue to follow her here outpatient and would obtain an MRI scan 1 year on the left she has any symptoms from this.

## 2023-12-20 ENCOUNTER — TELEPHONE (OUTPATIENT)
Dept: NEUROSURGERY | Facility: CLINIC | Age: 33
End: 2023-12-20
Payer: COMMERCIAL

## 2023-12-20 NOTE — TELEPHONE ENCOUNTER
Records Requested     December 20, 2023 9:47 AM   [id number]   Facility  [facility]   Outcome 9:49 am Sent request for imaging to be pushed to PACS. -ROEL Novak on 12/28/2023 at 8:14 AM Imaging resolved into PACS.-ROEL     RECORDS RECEIVED FROM: Care Everywhere   REASON FOR VISIT: Facial Pain   Date of Appt: 1/2/24 @ 3:30 pm    NOTES (FOR ALL VISITS) STATUS DETAILS   OFFICE NOTE from referring provider Care Everywhere 12/13/23, 11/8/23, 4/21/23 Rigoberto French MD  @[facility]     OFFICE NOTE from other specialist Care Everywhere 12/12/23, 5/23/23 Micheal Olivares MD @[facility]    10/11/23, 9/5/23, 6/2/23 Brandon Bautista MD @[facility]    9/28/23 Brandi Martinez DO  @[facility]    8/1/23 Anthony Brush MD @[facility]    5/16/23 Juan Roberson MD  @[facility]    5/3/23, 4/11/23 Misa Andrews NP @[facility]    3/27/23 Merritt Rowell MD  @[facility]    See Additional Encounters   DISCHARGE REPORT from the ER Care Everywhere 12/13/23 Galina Gutierrez MD  @[facility]    3/2/23 Gato Vasquez MD @[facility]     EMG Internal [facility]  10/11/23 Needle EMG Each Extremity     MEDICATION LIST Internal    IMAGING  (FOR ALL VISITS)     MRI (HEAD, NECK, SPINE) Internal [facility]  4/24/23 MR Cervical Spine  4/24/23 MR Brain    [facility]  3/20/23 MRA Neck (Carotids)  3/20/23 MRA Brain ([place])    [facility]  1/9/23 MR Cervical Spine     CT (HEAD, NECK, SPINE) PACS [facility]  12/14/23 CT Soft Tissue Neck  5/9/22 CT Soft Tissue Neck    [facility]  5/23/23 CTV Head Neck  2/24/23 CT Soft Tissue Neck    [facility]  2/24/23 CTA Head & Neck Carotid

## 2023-12-28 ENCOUNTER — THERAPY VISIT (OUTPATIENT)
Dept: PHYSICAL THERAPY | Facility: CLINIC | Age: 33
End: 2023-12-28
Attending: NURSE PRACTITIONER
Payer: COMMERCIAL

## 2023-12-28 DIAGNOSIS — G93.5 CHIARI MALFORMATION TYPE I (H): Primary | ICD-10-CM

## 2023-12-28 PROCEDURE — 97110 THERAPEUTIC EXERCISES: CPT | Mod: GP | Performed by: PHYSICAL THERAPIST

## 2024-01-02 ENCOUNTER — PRE VISIT (OUTPATIENT)
Dept: NEUROSURGERY | Facility: CLINIC | Age: 34
End: 2024-01-02

## 2024-01-02 ENCOUNTER — OFFICE VISIT (OUTPATIENT)
Dept: NEUROSURGERY | Facility: CLINIC | Age: 34
End: 2024-01-02
Payer: COMMERCIAL

## 2024-01-02 VITALS
OXYGEN SATURATION: 100 % | RESPIRATION RATE: 16 BRPM | HEIGHT: 62 IN | HEART RATE: 120 BPM | WEIGHT: 225 LBS | DIASTOLIC BLOOD PRESSURE: 80 MMHG | SYSTOLIC BLOOD PRESSURE: 116 MMHG | BODY MASS INDEX: 41.41 KG/M2

## 2024-01-02 VITALS
BODY MASS INDEX: 41.41 KG/M2 | RESPIRATION RATE: 16 BRPM | HEART RATE: 120 BPM | SYSTOLIC BLOOD PRESSURE: 116 MMHG | WEIGHT: 225 LBS | DIASTOLIC BLOOD PRESSURE: 80 MMHG | HEIGHT: 62 IN | OXYGEN SATURATION: 100 %

## 2024-01-02 DIAGNOSIS — R51.9 RIGHT FACIAL PAIN: Primary | ICD-10-CM

## 2024-01-02 DIAGNOSIS — R51.9 RIGHT FACIAL PAIN: ICD-10-CM

## 2024-01-02 DIAGNOSIS — F41.9 ANXIETY: Primary | ICD-10-CM

## 2024-01-02 PROCEDURE — 99207 PR NO BILLABLE SERVICE THIS VISIT: CPT | Performed by: NURSE PRACTITIONER

## 2024-01-02 PROCEDURE — 99417 PROLNG OP E/M EACH 15 MIN: CPT | Performed by: PHYSICIAN ASSISTANT

## 2024-01-02 PROCEDURE — 99215 OFFICE O/P EST HI 40 MIN: CPT | Performed by: PHYSICIAN ASSISTANT

## 2024-01-02 RX ORDER — FAMOTIDINE 10 MG
10 TABLET ORAL 2 TIMES DAILY
COMMUNITY
End: 2024-04-01

## 2024-01-02 RX ORDER — TRAMADOL HYDROCHLORIDE 50 MG/1
100 TABLET ORAL DAILY
COMMUNITY
Start: 2023-12-13

## 2024-01-02 ASSESSMENT — PAIN SCALES - GENERAL
PAINLEVEL: SEVERE PAIN (7)
PAINLEVEL: SEVERE PAIN (7)

## 2024-01-02 NOTE — LETTER
1/2/2024       RE: Paula Back  [address] [facility] [address]     Dear Colleague,    Thank you for referring your patient, Paula Back, to the [facility] NEUROSURGERY CLINIC [place] at [facility]. Please see a copy of my visit note below.        Neurosurgery Clinic Note    Chief Complaint: back pain    History of Present Illness:  It was a pleasure to evaluate Paula Back in clinic today at the kind referral of   Micheal Oilvares MD  [address]  [place] [address].    Paula Back is a 32 year old female with a PMH of Chiari Malformation Type I, papilledema, BMI 43 (Wegovy), pseudotumor cerebri, chronic headache, left paresthesias, who is presenting for evaluation of back pain.    MRI cervical is with left disc/osteophyte complex affecting left lateral recess and mod/sev NFS at C4/5 and to a lesser degree at C5/6.  There are no syrnx.  Thoracic and lumbar MRI are without any significant pathology.     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  Onset around August 2022.  She describes pain in her left shoulder and down the back of her arm into her 4th and 5th digits.  It was more of an aching, gnawing sensation.  She had issues with holding on to things with her left hand (she is right-handed).  She denies similar issues on the right side, but noted at that time she had stiffness on the right.  Her pain would be an 8-9/10 and after an the injection she was a 2/10.  She underwent C7/T1 PEG on 1/30/23 at [email or web address] which helped with pain for about 2.5 months and now starting to come back.  She has full body weakness on her left side.  She has a pain in her middle spine lower down.  She tends to fold in at this spot in her spine.  She feels the herniation in her neck is causing weakness and she relies on her right side to hold her neck up.  She has done PT at [facility] in [place] for 6 months and had minimal  results.  They had her doing nerve glides and posture related exercises.      She notes pain in her hip and travels down her leg on the left.  She has painless redness in her lateral toes occasionally.  She notes numbness on the side of her foot and lateral toes.  She notices this more when she is getting out of the shower.  She wears socks, so does not notice how long this lasts.  She notes reduced sensation on the outside of her left hip (down to about the GT bursa).      She notes issues with feeling the urge to void until she gets to the point of having pressure/fullness in her kidneys.  She has had workup by urology w/o etiology.  She also notes problems having a BM without using laxatives, which has been an issue for the last couple of months.  She denies incontinent episodes.  She notes numbness of her buttock and posterior keerthi area on the left.  She denies her torso is numb, but describes this area as painful because of slouching.      She has head pressure which makes her dizzy, so she does not walk fast.  She denies any gross problems with her balance.      She has not had any prior surgeries on her neck or back.      About 2 weeks after injection she felt that something snapped in her neck.  This felt as if something was blocking her swallowing, but she did not have any issues swallowing.  Her voice was hoarse overnight, but the next day was back to normal.  ENT checked her out and she did not have any blockage.  She was given steroids and this cleared up.      Patient notes some weakness, which is accompanied by shaking of her hands when she is trying to do a task.      Conservative Treatment:  PT at [facility] in [place] for 6 months and had minimal results.  Chiropractic manipulation - 2/2023 worse  Medical weight loss program Matilde (Wegovy) - stopped October 2023  Duloxetine per PCP  Ice/heat  Voltaren gel    1/2/24 Visit - Patient returns today to review her EMG study and to discuss if there  "are any interventions that might be helpful for her.  The EMG was negative for BUE neuropathy or radiculopathy.  Patient notes right sided neck pain into her shoulder.  She does not have any pain extending into her UE.  However, she does have intermittent n/t in all her fingers and toes.  This occurs about 1x/week.  Sometimes she gets n/t that extends up into her hands.  Patient expresses her frustration with not finding any answers.  She has had some issues with swallowing, food getting stuck and also with pain in the right side of her face and neck.  She gets tonsil stones and has a kit that she is able to take these out when needed that she got off of [employer].  She is concerned about something detaching in her neck since the 2/2023 chiro adjustment.  Using her instruments, she can see a white vertical band behind the posterior tonsillar pillar on the right that is not there on the left.  This was also seen by her dentist.  The dentist also witnessed an episode that patient describes as \"suctioning\" on the right side of her neck.  She also c/o of painful lymph nodes on that side of her neck.      She had sent messages in mid July 2023 regarding some of her symptoms that started after PT session after stretching exercises:  Right side of neck feeling pressure and tingling.  This extends to behind right eye.  Feeling of something being stuck or poking under jaw on right.  Temperature dysregulation, dulled emotion.  Concern that she may have elongated styloids pressing on nerves in her neck.      She underwent a C6/7 ILESI (11/22/2023) and left TFESI L5/S1 12/7/23, both at  which were not helpful.      Patient has upcoming OMFS appointment and has also consulted with facial pain today.  She is seeing a pain psychologist.     Review of Systems   As noted in HPI    No past medical history on file.    Past Surgical History:   Procedure Laterality Date    LASIK         Social History     Socioeconomic History    Marital " status:    Tobacco Use    Smoking status: Former     Packs/day: 0.25     Types: Cigarettes    Smokeless tobacco: Never    Tobacco comments:     she is not exposed to tobacco at home   Substance and Sexual Activity    Alcohol use: Yes     Comment: rare/once a month or less    Drug use: No    Sexual activity: Yes     Partners: Male     Birth control/protection: Condom, Injection   Other Topics Concern    Parent/sibling w/ CABG, MI or angioplasty before 65F 55M? No   Social History Narrative    Works at [employer] on 4:30-1 AM shift       family history includes Alcohol/Drug in her father and mother; Cancer in her maternal grandfather and maternal grandmother; Diabetes in her maternal grandfather.       IMAGING   Imaging independently reviewed.    EMG 10/11/23 - normal study BUE    Cervical MRI 4/24/23 -     IMPRESSION:  1.  Redemonstrated cerebellar tonsillar ectopia compatible in  appearance with a Chiari I malformation. CSF flow study is similar to  what was described on separate brain MRI.     2.  Cervical spine degenerative change as above without high-grade  spinal canal stenosis at any level. There is an unchanged moderate to  severe left C4-C5 and mild to moderate left C5-C6 neural foraminal  stenosis with moderate narrowing of the left C4-C5 lateral recess.            Brain MRI 4/24/23 -   IMPRESSION: Redemonstrated cerebellar tonsillar ectopia measuring up  to 1 cm below the foramen magnum. This is again in a manner compatible  with a Chiari I malformation. CSF flow study demonstrates abundant  flow ventral to the cervicomedullary junction, with additionally  preserved flow across the foramen of Magendie. There is only a very  small amount of flow dorsal to the cerebellar tonsils.    MRI Lumbar 4/24/23 -   IMPRESSION:  1.  Multilevel mild facet arthropathy in the lumbar spine as detailed  above. Accompanying degenerative disc disease is overall minimal, with  small posterior disc bulges that  "contribute to mild bilateral neural  foraminal stenosis at the L2-L3 to the L4-L5 levels. There is only  mild spinal canal stenosis at the L4-L5 level, with no high-grade  spinal canal stenosis at any level.     2.  Mild prominence of the right renal collecting system and right  ureter. Ultrasound could be considered in further evaluation if  warranted.    MRI thoracic 4/24/23 -   IMPRESSION: Overall unremarkable thoracic spine MRI without  significant degenerative change. No high-grade spinal canal or neural  foraminal stenosis.     Physical Exam     /80 (BP Location: Right arm, Patient Position: Sitting)   Pulse 120   Resp 16   Ht 1.575 m (5' 2\")   Wt 102.1 kg (225 lb)   SpO2 100%   BMI 41.15 kg/m      Constitutional: Appears well-developed and well-nourished. Cooperative. Tearful.   HENT:   Head: Normocephalic and atraumatic.   Eyes: Conjunctivae are normal.  Neck: Neck supple. No tracheal deviation present. +Right submandibular tender lymph node.  Right tonsil enlarged compared to left.  Prominent vertical white band behind right posterior tonsillar fold visible with patient retracting the fold.  Negative for similar finding on the left.    Cardiovascular: Normal rate and regular rhythm.    Pulmonary/Chest: Effort normal and breath sounds normal.  Abdominal: Exhibits no obvious distension.   Musculoskeletal: Able to move all extremities.  No involuntary motor movements. No significant cervical paraspinal TTP.    Skin: Skin is warm, dry and intact.   Psychiatric: Normal mood and affect. Speech is normal and behavior is normal.    Neurological:  Alert, tearful, anxious  facies symmetrical, EOMI, pupils equal.    Gait normal     ASSESSMENT:  Paula Back is a 32 year old female with a PMH of Chiari Malformation Type I, papilledema, BMI 43 (Wegovy), pseudotumor cerebri, chronic headache, left body paresthesias, who is presenting for evaluation of neck and back pain.  Her lumbar and thoracic MRIs are " grossly benign.  She does have C4/5 left HNP in the lateral recess and mod/sev NFS and to a lesser degree at C5/6.  EMG study is negative in BUE.      Patient's most concerning symptoms today seem to be related to the issue that is occurring on the right side of her neck, and involving various sensory issues on the right side of her face.  These symptoms go back to a 2/2023 chiro adjustment, but have been exacerbated with some PT exercises.      Her cervical spine is without etiology on the right side of her body.  She did not improve with injection on right at C6/7.      I spent considerable time with the patient today going over imaging and answering her questions about various things she saw on CT neck in 2022 and 2023 that she felt might help explain what she is experiencing.  I did not concur with her analysis.  She also has a concern that she might have an injury to a ligament, hyoid bone or elongated styloid processes pressing on nerves in her neck and a concern about the white vertical band behind her posterior tonsillar fold.      Differential includes:  Hyoid bone syndrome (I did not appreciate asymmetry on current CT scans)  Eagle syndrome  SCM spasm    We did discuss that is is possible to have more than one thing that is causing her symptoms.  I encouraged her to remain patient as she sees the various provides that may or may not have a diagnosis for her and to remain positive as we have not discovered anything bad that requires surgery or worse.  The fact there is not a diagnosis does not mean that the provider is not trying to help her, but just does not have a solution to her issues within their scope of practice.      PLAN:    No cervical surgery recommendations at this time as there is no MRI pathology on the right side of her neck to explain her symptoms.      She will be following up with facial pain group after submitting a comprehensive list of her symptoms and imaging findings.      OMFS  evaluation pending.     Consider CT head and neck with 3D reconstruction of sympathetic chain if no other valid etiologies come from her other consults.  This would r/o styloid and hyoid bone compressions of nerves in that area.     I spent 186 minutes spent in patient care, independent review and interpretation of medical records/imaging, reviewing old records.        Again, thank you for allowing me to participate in the care of your patient.      Sincerely,    Marissa Wang PA-C

## 2024-01-02 NOTE — LETTER
"2024       RE: Paula Back  [address] [address] [address]       Dear Colleague,    Thank you for referring your patient, Paula Back, to the [facility] NEUROSURGERY CLINIC [place] at [facility]. Please see a copy of my visit note below.    [facility]  Department of Neurosurgery      Name: Paula Back  MRN: [id number]  Age: 33 year old  : 1990  Referring provider: No ref. provider found  2024      Chief Complaint:   Right head/ neck pain  New Patient    History of Present Illness:   Paula Back is a 33 year old female with a history of Chiari malformation type I, pseudotumor cerebri, chronic headache is seen today for right sided neck pain and headaches.     Patient presents for the evaluation alone. Per patient, she had chiropractor adjustment in 2023. During the adjustment, she heard a \"cracking sound on the right side of her neck\" and since then, she has been dealing with pain on the right side of her neck, inside her right ear and entire right side of her head.     She reports 2 different types of pain. First: constant pain on the right jaw/ neck area like  \"my skin is suctioned inside\". Second: intermittent pain in right ear, right throat, behind her right eye. She reports \"fire like pain\". These episodes happen multiple times a day and each episodes last for 10-20 minutes. She \"believes that during the chiropractor adjustment, the hyoid bone was damaged and is compressing some nerves/ ligaments in my neck\". Symptoms are worse with light touch.    For Chiari malformation, she had a few visits with Dr. Olivares, most recently in 2023. Prior to that, she was evaluated by Dr. Espinal in Neuro ophthalmology for further evaluation for IIH and papilledema was ruled out.     Patient had a brain MRI in 2023. Please see results below.     She has been seen by various providers including ENT and " Pain management. She is currently taking Tizanidine 2 mg twice daily, Duloxetine 30 mg daily and Tylenol twice daily as needed.     She works as a .     Review of Systems:   Pertinent items are noted in HPI or as in patient entered ROS below, remainder of complete ROS is negative.        No data to display                 Active Medications:     Current Outpatient Medications:     acetaminophen (TYLENOL) 325 MG tablet, Take 650 mg by mouth, Disp: , Rfl:     acetaZOLAMIDE (DIAMOX) 250 MG tablet, 2 tabs p.o. every morning, 1 tab p.o. midday, 2 tabs p.o. nightly, Disp: 150 tablet, Rfl: 11    amphetamine-dextroamphetamine (ADDERALL XR) 20 MG 24 hr capsule, Take 20 mg by mouth daily, Disp: , Rfl:     amphetamine-dextroamphetamine (ADDERALL XR) 5 MG 24 hr capsule, , Disp: , Rfl:     clindamycin (CLINDAMAX) 1 % external gel, , Disp: , Rfl:     diazepam (VALIUM) 5 MG tablet, Take 20 minutes prior to procedure., Disp: 1 tablet, Rfl: 0    DULoxetine (CYMBALTA) 30 MG capsule, Take 1 capsule by mouth daily, Disp: , Rfl:     famotidine (PEPCID) 10 MG tablet, Take 10 mg by mouth 2 times daily, Disp: , Rfl:     ibuprofen (ADVIL/MOTRIN) 200 MG tablet, every 6 hours, Disp: , Rfl:     Levomefolate Glucosamine (METHYL-FOLATE PO), Take 7.5 mg by mouth daily, Disp: , Rfl:     methocarbamol (ROBAXIN) 500 MG tablet, , Disp: , Rfl:     omeprazole (PRILOSEC) 40 MG DR capsule, Take 1 capsule (40 mg) by mouth daily, Disp: 30 capsule, Rfl: 3    ondansetron (ZOFRAN) 4 MG tablet, Take 4 mg by mouth, Disp: , Rfl:     prochlorperazine (COMPAZINE) 5 MG tablet, Take 5 mg by mouth, Disp: , Rfl:     propranolol (INDERAL) 10 MG tablet, Take 10 mg by mouth as needed, Disp: , Rfl:     Semaglutide-Weight Management (WEGOVY) 2.4 MG/0.75ML pen, Inject 2.4 mg Subcutaneous, Disp: , Rfl:     tiZANidine (ZANAFLEX) 2 MG tablet, Take 1 mg by mouth, Disp: , Rfl:     traMADol (ULTRAM) 50 MG tablet, Take 50 mg by mouth, Disp: , Rfl:     traZODone  "(DESYREL) 50 MG tablet, Take 50 mg by mouth daily, Disp: , Rfl:     vilazodone (VIIBRYD) 20 MG TABS tablet, TAKE 1 TABLET BY MOUTH EVERY MORNING WITH BREAKFAST, Disp: , Rfl:       Allergies:   Patient has no known allergies.      Past Medical History:  No past medical history on file.  Patient Active Problem List   Diagnosis    Contraceptive management    Acne    Family hx of alcoholism    Papanicolaou smear of cervix with low grade squamous intraepithelial lesion (LGSIL)    Advanced directives, counseling/discussion    Insomnia    CARDIOVASCULAR SCREENING; LDL GOAL LESS THAN 160    SOB (shortness of breath)    24 hour contact handout given    Obese    Moderate major depression (H)    Generalized anxiety disorder    Morbid obesity (H)    Chiari malformation type I (H)        Past Surgical History:  Past Surgical History:   Procedure Laterality Date    LASIK         Family History:   Family History   Problem Relation Age of Onset    Diabetes Maternal Grandfather     Cancer Maternal Grandmother         lung     Cancer Maternal Grandfather         father    Alcohol/Drug Father     Alcohol/Drug Mother          Social History:   Social History     Tobacco Use    Smoking status: Former     Packs/day: .25     Types: Cigarettes    Smokeless tobacco: Never    Tobacco comments:     she is not exposed to tobacco at home   Substance Use Topics    Alcohol use: Yes     Comment: rare/once a month or less    Drug use: No        Physical Exam:   /80 (BP Location: Right arm, Patient Position: Sitting)   Pulse 120   Resp 16   Ht 1.575 m (5' 2\")   Wt 102.1 kg (225 lb)   SpO2 100%   BMI 41.15 kg/m     General: Tearful when describing pain symptoms.   Neuro: The patient is fully oriented. Speech is normal.       Imagin2023 CT Neck:  Impression    1. No adenopathy.  2. Normal deep soft tissues of the neck. Normal glottis with symmetric vocal cords. No significant asymmetric thickening or inflammation involving the " aryepiglottic folds.  3. Asymmetric focal effacement of the right piriform sinus compared to the left but with no underlying mass or inflammation of the surrounding soft tissues or mucosa  4. No prevertebral soft tissue swelling    4/2023 Brain MRI:  IMPRESSION: Redemonstrated cerebellar tonsillar ectopia measuring up  to 1 cm below the foramen magnum. This is again in a manner compatible  with a Chiari I malformation. CSF flow study demonstrates abundant  flow ventral to the cervicomedullary junction, with additionally  preserved flow across the foramen of Magendie. There is only a very  small amount of flow dorsal to the cerebellar tonsils.     Assessment:  Right head/ neck pain  New Patient    Plan:  Patient to continue current medications. Will review imaging with our team and will contact the patient with recommendations.       I spent 25 minutes on patient care activities related to this encounter on the date of service, including time spent reviewing the chart, obtaining history and examination and in counseling the patient, and in documentation in the electronic medical record.            Again, thank you for allowing me to participate in the care of your patient.      Sincerely,    BIRGIT Frazier CNP

## 2024-01-02 NOTE — PROGRESS NOTES
"[facility]  Department of Neurosurgery      Name: Paula Back  MRN: [id number]  Age: 33 year old  : 1990  Referring provider: No ref. provider found  2024      Chief Complaint:   Right head/ neck pain  New Patient    History of Present Illness:   Paula Back is a 33 year old female with a history of Chiari malformation type I, pseudotumor cerebri, chronic headache is seen today for right sided neck pain and headaches.     Patient presents for the evaluation alone. Per patient, she had chiropractor adjustment in 2023. During the adjustment, she heard a \"cracking sound on the right side of her neck\" and since then, she has been dealing with pain on the right side of her neck, inside her right ear and entire right side of her head.     She reports 2 different types of pain. First: constant pain on the right jaw/ neck area like  \"my skin is suctioned inside\". Second: intermittent pain in right ear, right throat, behind her right eye. She reports \"fire like pain\". These episodes happen multiple times a day and each episodes last for 10-20 minutes. She \"believes that during the chiropractor adjustment, the hyoid bone was damaged and is compressing some nerves/ ligaments in my neck\". Symptoms are worse with light touch.    For Chiari malformation, she had a few visits with Dr. Olivares, most recently in 2023. Prior to that, she was evaluated by Dr. Espinal in Neuro ophthalmology for further evaluation for IIH and papilledema was ruled out.     Patient had a brain MRI in 2023. Please see results below.     She has been seen by various providers including ENT and Pain management. She is currently taking Tizanidine 2 mg twice daily, Duloxetine 30 mg daily and Tylenol twice daily as needed.     She works as a .     Review of Systems:   Pertinent items are noted in HPI or as in patient entered ROS below, remainder of complete ROS is negative.        No data to display "                 Active Medications:     Current Outpatient Medications:     acetaminophen (TYLENOL) 325 MG tablet, Take 650 mg by mouth, Disp: , Rfl:     acetaZOLAMIDE (DIAMOX) 250 MG tablet, 2 tabs p.o. every morning, 1 tab p.o. midday, 2 tabs p.o. nightly, Disp: 150 tablet, Rfl: 11    amphetamine-dextroamphetamine (ADDERALL XR) 20 MG 24 hr capsule, Take 20 mg by mouth daily, Disp: , Rfl:     amphetamine-dextroamphetamine (ADDERALL XR) 5 MG 24 hr capsule, , Disp: , Rfl:     clindamycin (CLINDAMAX) 1 % external gel, , Disp: , Rfl:     diazepam (VALIUM) 5 MG tablet, Take 20 minutes prior to procedure., Disp: 1 tablet, Rfl: 0    DULoxetine (CYMBALTA) 30 MG capsule, Take 1 capsule by mouth daily, Disp: , Rfl:     famotidine (PEPCID) 10 MG tablet, Take 10 mg by mouth 2 times daily, Disp: , Rfl:     ibuprofen (ADVIL/MOTRIN) 200 MG tablet, every 6 hours, Disp: , Rfl:     Levomefolate Glucosamine (METHYL-FOLATE PO), Take 7.5 mg by mouth daily, Disp: , Rfl:     methocarbamol (ROBAXIN) 500 MG tablet, , Disp: , Rfl:     omeprazole (PRILOSEC) 40 MG DR capsule, Take 1 capsule (40 mg) by mouth daily, Disp: 30 capsule, Rfl: 3    ondansetron (ZOFRAN) 4 MG tablet, Take 4 mg by mouth, Disp: , Rfl:     prochlorperazine (COMPAZINE) 5 MG tablet, Take 5 mg by mouth, Disp: , Rfl:     propranolol (INDERAL) 10 MG tablet, Take 10 mg by mouth as needed, Disp: , Rfl:     Semaglutide-Weight Management (WEGOVY) 2.4 MG/0.75ML pen, Inject 2.4 mg Subcutaneous, Disp: , Rfl:     tiZANidine (ZANAFLEX) 2 MG tablet, Take 1 mg by mouth, Disp: , Rfl:     traMADol (ULTRAM) 50 MG tablet, Take 50 mg by mouth, Disp: , Rfl:     traZODone (DESYREL) 50 MG tablet, Take 50 mg by mouth daily, Disp: , Rfl:     vilazodone (VIIBRYD) 20 MG TABS tablet, TAKE 1 TABLET BY MOUTH EVERY MORNING WITH BREAKFAST, Disp: , Rfl:       Allergies:   Patient has no known allergies.      Past Medical History:  No past medical history on file.  Patient Active Problem List   Diagnosis  "   Contraceptive management    Acne    Family hx of alcoholism    Papanicolaou smear of cervix with low grade squamous intraepithelial lesion (LGSIL)    Advanced directives, counseling/discussion    Insomnia    CARDIOVASCULAR SCREENING; LDL GOAL LESS THAN 160    SOB (shortness of breath)    24 hour contact handout given    Obese    Moderate major depression (H)    Generalized anxiety disorder    Morbid obesity (H)    Chiari malformation type I (H)        Past Surgical History:  Past Surgical History:   Procedure Laterality Date    LASIK         Family History:   Family History   Problem Relation Age of Onset    Diabetes Maternal Grandfather     Cancer Maternal Grandmother         lung     Cancer Maternal Grandfather         father    Alcohol/Drug Father     Alcohol/Drug Mother          Social History:   Social History     Tobacco Use    Smoking status: Former     Packs/day: .25     Types: Cigarettes    Smokeless tobacco: Never    Tobacco comments:     she is not exposed to tobacco at home   Substance Use Topics    Alcohol use: Yes     Comment: rare/once a month or less    Drug use: No        Physical Exam:   /80 (BP Location: Right arm, Patient Position: Sitting)   Pulse 120   Resp 16   Ht 1.575 m (5' 2\")   Wt 102.1 kg (225 lb)   SpO2 100%   BMI 41.15 kg/m     General: Tearful when describing pain symptoms.   Neuro: The patient is fully oriented. Speech is normal.       Imagin2023 CT Neck:  Impression    1. No adenopathy.  2. Normal deep soft tissues of the neck. Normal glottis with symmetric vocal cords. No significant asymmetric thickening or inflammation involving the aryepiglottic folds.  3. Asymmetric focal effacement of the right piriform sinus compared to the left but with no underlying mass or inflammation of the surrounding soft tissues or mucosa  4. No prevertebral soft tissue swelling    2023 Brain MRI:  IMPRESSION: Redemonstrated cerebellar tonsillar ectopia measuring up  to 1 cm " below the foramen magnum. This is again in a manner compatible  with a Chiari I malformation. CSF flow study demonstrates abundant  flow ventral to the cervicomedullary junction, with additionally  preserved flow across the foramen of Magendie. There is only a very  small amount of flow dorsal to the cerebellar tonsils.     Assessment:  Right head/ neck pain  New Patient    Plan:  Patient to continue current medications. Will review imaging with our team and will contact the patient with recommendations.       Lala Bassett CNP  Department of Neurosurgery    I spent 25 minutes on patient care activities related to this encounter on the date of service, including time spent reviewing the chart, obtaining history and examination and in counseling the patient, and in documentation in the electronic medical record.

## 2024-01-02 NOTE — PROGRESS NOTES
[facility]  Department of Neurosurgery      Name: Paula Back  MRN: [id number]  Age: 33 year old  : 1990  Referring provider: No ref. provider found  2024      Chief Complaint:   Right sided headache  New Patient    History of Present Illness:   Paula Back is a 33 year old female with a history of Chairi malformation who is here today for ***      Review of Systems:   Pertinent items are noted in HPI or as in patient entered ROS below, remainder of complete ROS is negative.        No data to display                 Active Medications:     Current Outpatient Medications:     acetaminophen (TYLENOL) 325 MG tablet, Take 650 mg by mouth, Disp: , Rfl:     acetaZOLAMIDE (DIAMOX) 250 MG tablet, 2 tabs p.o. every morning, 1 tab p.o. midday, 2 tabs p.o. nightly, Disp: 150 tablet, Rfl: 11    amphetamine-dextroamphetamine (ADDERALL XR) 20 MG 24 hr capsule, Take 20 mg by mouth daily, Disp: , Rfl:     amphetamine-dextroamphetamine (ADDERALL XR) 5 MG 24 hr capsule, , Disp: , Rfl:     clindamycin (CLINDAMAX) 1 % external gel, , Disp: , Rfl:     diazepam (VALIUM) 5 MG tablet, Take 20 minutes prior to procedure. (Patient not taking: Reported on 2023), Disp: 1 tablet, Rfl: 0    DULoxetine (CYMBALTA) 30 MG capsule, Take 1 capsule by mouth daily, Disp: , Rfl:     ibuprofen (ADVIL/MOTRIN) 200 MG tablet, every 6 hours, Disp: , Rfl:     Levomefolate Glucosamine (METHYL-FOLATE PO), Take 7.5 mg by mouth daily (Patient not taking: Reported on 2023), Disp: , Rfl:     methocarbamol (ROBAXIN) 500 MG tablet, , Disp: , Rfl:     omeprazole (PRILOSEC) 40 MG DR capsule, Take 1 capsule (40 mg) by mouth daily (Patient not taking: Reported on 2023), Disp: 30 capsule, Rfl: 3    ondansetron (ZOFRAN) 4 MG tablet, Take 4 mg by mouth (Patient not taking: Reported on 2023), Disp: , Rfl:     prochlorperazine (COMPAZINE) 5 MG tablet, Take 5 mg by mouth (Patient not taking: Reported on 2023),  Disp: , Rfl:     propranolol (INDERAL) 10 MG tablet, Take 10 mg by mouth as needed, Disp: , Rfl:     Semaglutide-Weight Management (WEGOVY) 2.4 MG/0.75ML pen, Inject 2.4 mg Subcutaneous (Patient not taking: Reported on 12/12/2023), Disp: , Rfl:     tiZANidine (ZANAFLEX) 2 MG tablet, Take 1 mg by mouth, Disp: , Rfl:     traZODone (DESYREL) 50 MG tablet, Take 50 mg by mouth daily, Disp: , Rfl:     vilazodone (VIIBRYD) 20 MG TABS tablet, TAKE 1 TABLET BY MOUTH EVERY MORNING WITH BREAKFAST, Disp: , Rfl:       Allergies:   Patient has no known allergies.      Past Medical History:  No past medical history on file.  Patient Active Problem List   Diagnosis    Contraceptive management    Acne    Family hx of alcoholism    Papanicolaou smear of cervix with low grade squamous intraepithelial lesion (LGSIL)    Advanced directives, counseling/discussion    Insomnia    CARDIOVASCULAR SCREENING; LDL GOAL LESS THAN 160    SOB (shortness of breath)    24 hour contact handout given    Obese    Moderate major depression (H)    Generalized anxiety disorder    Morbid obesity (H)    Chiari malformation type I (H)        Past Surgical History:  Past Surgical History:   Procedure Laterality Date    LASIK         Family History:   Family History   Problem Relation Age of Onset    Diabetes Maternal Grandfather     Cancer Maternal Grandmother         lung     Cancer Maternal Grandfather         father    Alcohol/Drug Father     Alcohol/Drug Mother          Social History:   Social History     Tobacco Use    Smoking status: Former     Packs/day: .25     Types: Cigarettes    Smokeless tobacco: Never    Tobacco comments:     she is not exposed to tobacco at home   Substance Use Topics    Alcohol use: Yes     Comment: rare/once a month or less    Drug use: No        Physical Exam:   There were no vitals taken for this visit.   General: No acute distress.   Eyes: Conjunctivae are normal.  Lungs: Clear to auscultation bilaterally. Normal effort,  equal breath sounds.   Heart: ***  MSK: Moves all extremities.  No obvious deformity.  Neuro: The patient is fully oriented. Speech is normal. Extraocular movements are intact without nystagmus. Facial sensation is intact in V1, V2, V3 distributions. Facial nerve function is normal, rated as a House Brackmann ***. Shoulders are full strength. There is no pronator drift. Full strength in all extremities. Sensation intact throughout. No dysmetria with finger-nose-finger testing. Gait is normal with normal heel-toe walking.   Psych: Normal mood and affect. Behavior is normal.      Imaging:  ***     Assessment:  ***    Plan:  ***      Lala Bassett CNP  Department of Neurosurgery    I spent *** minutes on patient care activities related to this encounter on the date of service, including time spent reviewing the chart, obtaining history and examination and in counseling the patient, and in documentation in the electronic medical record.

## 2024-01-02 NOTE — PROGRESS NOTES
Neurosurgery Clinic Note    Chief Complaint: back pain    History of Present Illness:  It was a pleasure to evaluate Paula Back in clinic today at the kind referral of   Micheal Olivares MD  [address] [address].    Paula Back is a 32 year old female with a PMH of Chiari Malformation Type I, papilledema, BMI 43 (Wegovy), pseudotumor cerebri, chronic headache, left paresthesias, who is presenting for evaluation of back pain.    MRI cervical is with left disc/osteophyte complex affecting left lateral recess and mod/sev NFS at C4/5 and to a lesser degree at C5/6.  There are no syrnx.  Thoracic and lumbar MRI are without any significant pathology.     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  Onset around August 2022.  She describes pain in her left shoulder and down the back of her arm into her 4th and 5th digits.  It was more of an aching, gnawing sensation.  She had issues with holding on to things with her left hand (she is right-handed).  She denies similar issues on the right side, but noted at that time she had stiffness on the right.  Her pain would be an 8-9/10 and after an the injection she was a 2/10.  She underwent C7/T1 PEG on 1/30/23 at [facility] which helped with pain for about 2.5 months and now starting to come back.  She has full body weakness on her left side.  She has a pain in her middle spine lower down.  She tends to fold in at this spot in her spine.  She feels the herniation in her neck is causing weakness and she relies on her right side to hold her neck up.  She has done PT at [facility] in [place] for 6 months and had minimal results.  They had her doing nerve glides and posture related exercises.      She notes pain in her hip and travels down her leg on the left.  She has painless redness in her lateral toes occasionally.  She notes numbness on the side of her foot and lateral toes.  She notices this more when she is getting out of the  shower.  She wears socks, so does not notice how long this lasts.  She notes reduced sensation on the outside of her left hip (down to about the GT bursa).      She notes issues with feeling the urge to void until she gets to the point of having pressure/fullness in her kidneys.  She has had workup by urology w/o etiology.  She also notes problems having a BM without using laxatives, which has been an issue for the last couple of months.  She denies incontinent episodes.  She notes numbness of her buttock and posterior keerthi area on the left.  She denies her torso is numb, but describes this area as painful because of slouching.      She has head pressure which makes her dizzy, so she does not walk fast.  She denies any gross problems with her balance.      She has not had any prior surgeries on her neck or back.      About 2 weeks after injection she felt that something snapped in her neck.  This felt as if something was blocking her swallowing, but she did not have any issues swallowing.  Her voice was hoarse overnight, but the next day was back to normal.  ENT checked her out and she did not have any blockage.  She was given steroids and this cleared up.      Patient notes some weakness, which is accompanied by shaking of her hands when she is trying to do a task.      Conservative Treatment:  PT at [facility] in [place] for 6 months and had minimal results.  Chiropractic manipulation - 2/2023 [facility]  Medical weight loss program [facility]ilde (Wegovy) - stopped October 2023  Duloxetine per PCP  Ice/heat  Voltaren gel    1/2/24 Visit - Patient returns today to review her EMG study and to discuss if there are any interventions that might be helpful for her.  The EMG was negative for BUE neuropathy or radiculopathy.  Patient notes right sided neck pain into her shoulder.  She does not have any pain extending into her UE.  However, she does have intermittent n/t in all her fingers and toes.  This occurs about 1x/week.   "Sometimes she gets n/t that extends up into her hands.  Patient expresses her frustration with not finding any answers.  She has had some issues with swallowing, food getting stuck and also with pain in the right side of her face and neck.  She gets tonsil stones and has a kit that she is able to take these out when needed that she got off of [employer].  She is concerned about something detaching in her neck since the 2/2023 chiro adjustment.  Using her instruments, she can see a white vertical band behind the posterior tonsillar pillar on the right that is not there on the left.  This was also seen by her dentist.  The dentist also witnessed an episode that patient describes as \"suctioning\" on the right side of her neck.  She also c/o of painful lymph nodes on that side of her neck.      She had sent messages in mid July 2023 regarding some of her symptoms that started after PT session after stretching exercises:  Right side of neck feeling pressure and tingling.  This extends to behind right eye.  Feeling of something being stuck or poking under jaw on right.  Temperature dysregulation, dulled emotion.  Concern that she may have elongated styloids pressing on nerves in her neck.      She underwent a C6/7 ILESI (11/22/2023) and left TFESI L5/S1 12/7/23, both at  which were not helpful.      Patient has upcoming OMFS appointment and has also consulted with facial pain today.  She is seeing a pain psychologist.     Review of Systems   As noted in HPI    No past medical history on file.    Past Surgical History:   Procedure Laterality Date    LASIK         Social History     Socioeconomic History    Marital status:    Tobacco Use    Smoking status: Former     Packs/day: 0.25     Types: Cigarettes    Smokeless tobacco: Never    Tobacco comments:     she is not exposed to tobacco at home   Substance and Sexual Activity    Alcohol use: Yes     Comment: rare/once a month or less    Drug use: No    Sexual activity: " Yes     Partners: Male     Birth control/protection: Condom, Injection   Other Topics Concern    Parent/sibling w/ CABG, MI or angioplasty before 65F 55M? No   Social History Narrative    Works at [employer] on 4:30-1 AM shift       family history includes Alcohol/Drug in her father and mother; Cancer in her maternal grandfather and maternal grandmother; Diabetes in her maternal grandfather.       IMAGING   Imaging independently reviewed.    EMG 10/11/23 - normal study BUE    Cervical MRI 4/24/23 -     IMPRESSION:  1.  Redemonstrated cerebellar tonsillar ectopia compatible in  appearance with a Chiari I malformation. CSF flow study is similar to  what was described on separate brain MRI.     2.  Cervical spine degenerative change as above without high-grade  spinal canal stenosis at any level. There is an unchanged moderate to  severe left C4-C5 and mild to moderate left C5-C6 neural foraminal  stenosis with moderate narrowing of the left C4-C5 lateral recess.            Brain MRI 4/24/23 -   IMPRESSION: Redemonstrated cerebellar tonsillar ectopia measuring up  to 1 cm below the foramen magnum. This is again in a manner compatible  with a Chiari I malformation. CSF flow study demonstrates abundant  flow ventral to the cervicomedullary junction, with additionally  preserved flow across the foramen of Magendie. There is only a very  small amount of flow dorsal to the cerebellar tonsils.    MRI Lumbar 4/24/23 -   IMPRESSION:  1.  Multilevel mild facet arthropathy in the lumbar spine as detailed  above. Accompanying degenerative disc disease is overall minimal, with  small posterior disc bulges that contribute to mild bilateral neural  foraminal stenosis at the L2-L3 to the L4-L5 levels. There is only  mild spinal canal stenosis at the L4-L5 level, with no high-grade  spinal canal stenosis at any level.     2.  Mild prominence of the right renal collecting system and right  ureter. Ultrasound could be considered  "in further evaluation if  warranted.    MRI thoracic 4/24/23 -   IMPRESSION: Overall unremarkable thoracic spine MRI without  significant degenerative change. No high-grade spinal canal or neural  foraminal stenosis.     Physical Exam     /80 (BP Location: Right arm, Patient Position: Sitting)   Pulse 120   Resp 16   Ht 1.575 m (5' 2\")   Wt 102.1 kg (225 lb)   SpO2 100%   BMI 41.15 kg/m      Constitutional: Appears well-developed and well-nourished. Cooperative. Tearful.   HENT:   Head: Normocephalic and atraumatic.   Eyes: Conjunctivae are normal.  Neck: Neck supple. No tracheal deviation present. +Right submandibular tender lymph node.  Right tonsil enlarged compared to left.  Prominent vertical white band behind right posterior tonsillar fold visible with patient retracting the fold.  Negative for similar finding on the left.    Cardiovascular: Normal rate and regular rhythm.    Pulmonary/Chest: Effort normal and breath sounds normal.  Abdominal: Exhibits no obvious distension.   Musculoskeletal: Able to move all extremities.  No involuntary motor movements. No significant cervical paraspinal TTP.    Skin: Skin is warm, dry and intact.   Psychiatric: Normal mood and affect. Speech is normal and behavior is normal.    Neurological:  Alert, tearful, anxious  facies symmetrical, EOMI, pupils equal.    Gait normal     ASSESSMENT:  Paula Back is a 32 year old female with a PMH of Chiari Malformation Type I, papilledema, BMI 43 (Wegovy), pseudotumor cerebri, chronic headache, left body paresthesias, who is presenting for evaluation of neck and back pain.  Her lumbar and thoracic MRIs are grossly benign.  She does have C4/5 left HNP in the lateral recess and mod/sev NFS and to a lesser degree at C5/6.  EMG study is negative in BUE.      Patient's most concerning symptoms today seem to be related to the issue that is occurring on the right side of her neck, and involving various sensory issues on the " right side of her face.  These symptoms go back to a 2/2023 chiro adjustment, but have been exacerbated with some PT exercises.      Her cervical spine is without etiology on the right side of her body.  She did not improve with injection on right at C6/7.      I spent considerable time with the patient today going over imaging and answering her questions about various things she saw on CT neck in 2022 and 2023 that she felt might help explain what she is experiencing.  I did not concur with her analysis.  She also has a concern that she might have an injury to a ligament, hyoid bone or elongated styloid processes pressing on nerves in her neck and a concern about the white vertical band behind her posterior tonsillar fold.      Differential includes:  Hyoid bone syndrome (I did not appreciate asymmetry on current CT scans)  Eagle syndrome  SCM spasm    We did discuss that is is possible to have more than one thing that is causing her symptoms.  I encouraged her to remain patient as she sees the various provides that may or may not have a diagnosis for her and to remain positive as we have not discovered anything bad that requires surgery or worse.  The fact there is not a diagnosis does not mean that the provider is not trying to help her, but just does not have a solution to her issues within their scope of practice.      PLAN:    No cervical surgery recommendations at this time as there is no MRI pathology on the right side of her neck to explain her symptoms.      She will be following up with facial pain group after submitting a comprehensive list of her symptoms and imaging findings.      OMFS evaluation pending.     Consider CT head and neck with 3D reconstruction of sympathetic chain if no other valid etiologies come from her other consults.  This would r/o styloid and hyoid bone compressions of nerves in that area.     I spent 186 minutes spent in patient care, independent review and interpretation of  medical records/imaging, reviewing old records.      Marissa Wang PA-C  Department of Neurosurgery  Office: [phone]

## 2024-02-01 ENCOUNTER — THERAPY VISIT (OUTPATIENT)
Dept: PHYSICAL THERAPY | Facility: CLINIC | Age: 34
End: 2024-02-01
Attending: NURSE PRACTITIONER
Payer: COMMERCIAL

## 2024-02-01 DIAGNOSIS — G93.5 CHIARI MALFORMATION TYPE I (H): Primary | ICD-10-CM

## 2024-02-01 PROCEDURE — 97110 THERAPEUTIC EXERCISES: CPT | Mod: GP | Performed by: PHYSICAL THERAPIST

## 2024-02-08 ENCOUNTER — MYC MEDICAL ADVICE (OUTPATIENT)
Dept: NEUROSURGERY | Facility: CLINIC | Age: 34
End: 2024-02-08
Payer: COMMERCIAL

## 2024-02-21 ENCOUNTER — THERAPY VISIT (OUTPATIENT)
Dept: PHYSICAL THERAPY | Facility: CLINIC | Age: 34
End: 2024-02-21
Attending: NURSE PRACTITIONER
Payer: COMMERCIAL

## 2024-02-21 DIAGNOSIS — G93.5 CHIARI MALFORMATION TYPE I (H): Primary | ICD-10-CM

## 2024-02-21 PROCEDURE — 97110 THERAPEUTIC EXERCISES: CPT | Mod: GP | Performed by: PHYSICAL THERAPIST

## 2024-02-21 NOTE — PROGRESS NOTES
DISCHARGE  Reason for Discharge: Patient has multiple complaints that she is currently working with a team of physicians and ENT with. Currently pt cannot use her arms for press ups, cannot walk any length of time, cannot tolerate manual therapy on her neck. With the limitation PT is not helping at the present. Therefore discharging pt and instructed her to come back when she is able to take more part physical therapy process.         Discharge Plan: Patient to continue home program.   02/21/24 0500   Appointment Info   Signing clinician's name / credentials Alicia Augustin PT   Total/Authorized Visits [employer] [place]   Visits Used 6   Medical Diagnosis Chiari malformation   PT Tx Diagnosis neck pain, pressure in the head, HA, LBP, LLE weakness, balance issues, throat tightness, upper abd pain with sititng   Progress Note/Certification   Onset of illness/injury or Date of Surgery 11/15/17   Therapy Frequency 1-2x per wk   Predicted Duration 90 days   Progress Note Due Date 11/11/23   Progress Note Completed Date 11/02/23   GOALS   PT Goals 2;3;4   PT Goal 1   Goal Identifier 1   Goal Description Patient no longer has neck pain and has full AROM of the neck, no pulling. The full painfree AROM will assist her in her ability to lift her son with no symptoms.   Rationale to maximize safety and independence with performance of ADLs and functional tasks   Goal Progress not met   Target Date 01/30/24   PT Goal 2   Goal Identifier 2   Goal Description Patient no longer has pressure in her head so she can perform all ADL;s and her work without getting distracted by the head sx's   Rationale to maximize safety and independence with performance of ADLs and functional tasks   Goal Progress not met   Target Date 01/30/24   PT Goal 3   Goal Identifier 3   Goal Description Patient no longer has LBP and LLE pain, along with normal strength so pt can play and move around her environment with her son.   Rationale to maximize safety and  independence with performance of ADLs and functional tasks   Goal Progress not met   Target Date 01/30/24   PT Goal 4   Goal Identifier 4   Goal Description Patient is able to tolerate sitting/standing 1-3 hours for her work so she can sit/stand vs laying to do her job.   Rationale to maximize safety and independence with performance of ADLs and functional tasks   Goal Progress not met   Target Date 01/30/24   Subjective Report   Subjective Report Met with neuro-otologist and he wants more details for diagnostic tests. Have not really did the press ups on the wall because the back just does not want to bend.   Objective Measure 1   Objective Measure head pressure  right eye pressure   Details #4   Objective Measure 2   Objective Measure PINEDA   Details #3   Objective Measure 3   Objective Measure neck pain   Details #5   Objective Measure 4   Objective Measure LBP/LLE pain   Details LBP #4   left foot #1  left butt #3   Objective Measure 5   Objective Measure Ability to sit without pain   Details none   Objective Measure 6   Objective Measure Discomfort with swallowing   Details #3   Therapeutic Procedure/Exercise   Therapeutic Procedures: strength, endurance, ROM, flexibillity minutes (44380) 15   Ther Proc 1 - Details reinstructed in the back ext's with hands on the wall pt does well with these   Patient Response/Progress see DC   Education   Learner/Method Patient;Listening;Reading;Demonstration   Plan   Home program think about a exercise like walking and how to do on a regular basisi, bring pics of work station and chair for work. PPU 10x every 2 hours. back rules, sit with butt higher than knees, golfers , no bending and centralization   Plan for next session MT, LB assess , HEP   Comments   Comments only surgery is B tubal ligation   Total Session Time   Timed Code Treatment Minutes 15   Total Treatment Time (sum of timed and untimed services) 15         Referring Provider:  No ref. provider found

## 2024-02-29 ENCOUNTER — TELEPHONE (OUTPATIENT)
Dept: NEUROSURGERY | Facility: CLINIC | Age: 34
End: 2024-02-29
Payer: COMMERCIAL

## 2024-02-29 NOTE — TELEPHONE ENCOUNTER
RECORDS RECEIVED FROM: Care Everywhere   REASON FOR VISIT: Facial and throat pain   PROVIDER: Cipriano Pichardo MD   DATE OF APPT: 4/16/24 @ 2:00 pm    NOTES (FOR ALL VISITS) STATUS DETAILS   OFFICE NOTE from referring provider Internal 2/29/24 (Phone Note)   OFFICE NOTE from other specialist [facility] Everywhere 2/26/24, 2/19/24, 2/2/24, 1/24/24 Rigober[facility] French [facility]  [facility]    1/2/24 Marissa aWng PA-C @[facility]    1/2/24 Lala Bassett, BIRGIT CNP [facility]    12/12/23, 5/23/23 Micheal Olivares MD @[facility]     10/11/23, 9/5/23, 6/2/23 Brandon Bautista MD @[facility]     9/28/23 Brandi Martinez DO  @[facility]     8/1/23 Anthony Brush MD @[facility]     5/16/23 Da[facility]uan [facility]  [facility]     5/3/23, 4/11/23 Misa Andrews NP @[facility]     3/27/23 Merritt Rowell MD  @[facility]     See Additional Encounters   DISCHARGE REPORT from the ER Care Everywhere 12/13/23 Galina Gutierrez MD  @[facility] ED     3/2/23 Gato Vasquez MD @[facility]   EMG Internal [facility]  10/11/23 Needle EMG Each Extremity   MEDICATION LIST Internal    IMAGING  (FOR ALL VISITS)     MRI (HEAD, NECK, SPINE) Internal [facility]  4/24/23 MR Cervical Spine  4/24/23 MR Brain     [facility]  3/20/23 MRA Neck (Carotids)  3/20/23 MRA Brain ([place] [place])     [facility]  1/9/23 MR Cervical Spine     CT (HEAD, NECK, SPINE) Internal [facility]  12/14/23 CT Soft Tissue Neck  5/9/22 CT Soft Tissue Neck     [facility]  5/23/23 CTV Head Neck  2/24/23 CT Soft Tissue Neck     [facility]  2/24/23 CTA Head & Neck Carotid
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
## 2024-03-05 ENCOUNTER — OFFICE VISIT (OUTPATIENT)
Dept: NEUROLOGY | Facility: CLINIC | Age: 34
End: 2024-03-05
Payer: COMMERCIAL

## 2024-03-05 ENCOUNTER — VIRTUAL VISIT (OUTPATIENT)
Dept: NEUROSURGERY | Facility: CLINIC | Age: 34
End: 2024-03-05
Attending: NEUROLOGICAL SURGERY
Payer: COMMERCIAL

## 2024-03-05 VITALS
HEIGHT: 62 IN | DIASTOLIC BLOOD PRESSURE: 59 MMHG | WEIGHT: 225 LBS | BODY MASS INDEX: 41.41 KG/M2 | HEART RATE: 108 BPM | SYSTOLIC BLOOD PRESSURE: 97 MMHG

## 2024-03-05 DIAGNOSIS — G93.5 CHIARI MALFORMATION TYPE I (H): ICD-10-CM

## 2024-03-05 DIAGNOSIS — G93.2 PSEUDOTUMOR CEREBRI SYNDROME: Primary | ICD-10-CM

## 2024-03-05 DIAGNOSIS — R20.2 PARESTHESIAS: ICD-10-CM

## 2024-03-05 DIAGNOSIS — H47.10 PAPILLEDEMA: ICD-10-CM

## 2024-03-05 DIAGNOSIS — G93.5 CHIARI MALFORMATION TYPE I (H): Primary | ICD-10-CM

## 2024-03-05 PROCEDURE — 99212 OFFICE O/P EST SF 10 MIN: CPT | Mod: 95 | Performed by: NEUROLOGICAL SURGERY

## 2024-03-05 PROCEDURE — G2211 COMPLEX E/M VISIT ADD ON: HCPCS | Performed by: PSYCHIATRY & NEUROLOGY

## 2024-03-05 PROCEDURE — 99215 OFFICE O/P EST HI 40 MIN: CPT | Performed by: PSYCHIATRY & NEUROLOGY

## 2024-03-05 RX ORDER — LORAZEPAM 0.5 MG/1
1 TABLET ORAL DAILY PRN
COMMUNITY
Start: 2024-02-27

## 2024-03-05 RX ORDER — ACETAZOLAMIDE 250 MG/1
TABLET ORAL
Qty: 150 TABLET | Refills: 11 | Status: SHIPPED | OUTPATIENT
Start: 2024-03-05 | End: 2024-08-01

## 2024-03-05 RX ORDER — BACLOFEN 10 MG/1
10 TABLET ORAL 2 TIMES DAILY
COMMUNITY
Start: 2024-02-19 | End: 2024-08-01

## 2024-03-05 NOTE — NURSING NOTE
Chief Complaint   Patient presents with    Pseudotumor cerebri     6 month follow up. Pt states she has been feeling very weak and tired. She has had increased tremors in bilat hands. She is having occasional headaches on the right side of head and face. She is having visual disturbance. She had an episode of some ear sensations, became overwhelming, had a lot of pain at the base of her skull, had switching of muscles, lasted 10 minutes, did not lose consciousness. Thinks it may have happened because she changed diamox dose, now back to original.     Dottie Benson LPN on 3/5/2024 at 9:00 AM
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
## 2024-03-05 NOTE — LETTER
3/5/2024      RE: Paula Back  [address] [facility] [address]     Dear Colleague,    Thank you for the opportunity to participate in the care of your patient, Paual Back, at the [facility] at [facility]. Please see a copy of my visit note below.    It was a pleasure virtually seeing Paula Back in clinic today. She was last seen on 12/12/23.  She is a pleasant 33 year old who presented to us initially with reported finding of papilledema and headaches.  It sounds like she had been having the symptoms since 2017, with papilledema reported that time, and placed on Diamox.  She also complained of blackout spells.  Her headaches are not classic for Chiari type headaches.  Her MRI scan revealed descent of cerebellar tonsils about 8 mm below the foramen magnum line, with a rounded appearance and no significant crowding and ample CSF spaces at the level of the FM.  To further evaluate for evidence of intracranial hypertension, we sent her for evaluation by ophthalmology and no papilledema was noted. She has not had an LP.       She was seen in December with several changes and concerns for CSF leaking as well as nerves eroding into her oropharynx.  She has been having pain along her face and in the back of her jaw.  This is associated with pressure pain behind her eye.  She also complains of tinnitus,.  Most of her symptoms occur on the right side of her face but sometimes on the left side.  She has been having problems in her oropharynx and noted something white in her mucus that she felt that a nerve that popped out.  He has seen ENT and was told she suffers from GERD but they did not feel she had an exposed nerve within her oropharynx.  She was evaluated by Dr. Espinal in ophthalmology in the past and there was no papilledema noted on exam.  She has been prescribed Diamox to take 1000 g/day due to symptoms of  IIH.  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  She had a recent episode that she feels may have been seizure like and she is not sure and will be discussing with neurologist.     She is interested in knowing if her symptoms are related to either intracranial hypertension or intracranial hypotension.      She did not obtain any imaging studies today.      Impression: Chiari malformation, likely incidental, question of IIH with no clear documentation of intracranial hypertension or papilledema, on diamox, other pain symptoms of unclear etiology     Plan: We discussed again that is unclear if she is having symptoms that are associated or caused by high ICP or possibly low ICP as she has been concerned in the past with leaking of CSF. We discussed that it is difficult to know what her ICP is and that taking diamox would likely lower it even if it were normal. We discussed options for measurement of ICP that include direct ICP monitoring versus LP. We discussed that LP can lead to false measurements depending on factors such as positioning and pCO2 at the time of the measurement. She would like to undergo LP and I think this is reasonable. She is weanng diamox and would like to have the study prior to completion of the wean. We discussed that it is reasonable and it will be helpful if the ICP is high on diamox and that it is possible that she may require another LP in the future completely off diamox in order to know the true ICP. We will work to get this scheduled.       Please do not hesitate to contact me if you have any questions/concerns.     Sincerely,       Micheal Olivares MD

## 2024-03-05 NOTE — PATIENT INSTRUCTIONS
You met with Pediatric Neurosurgery at the [facility]    [address] Dr., NP      Pediatric Appointment Scheduling and Call Center:   [phone]    Nurse Practitioner  [phone]    Mailing Address  [address] [address]    Street Address   [address] [place] [id number]    Fax Number  [phone]    For urgent matters that cannot wait until the next business day, occur over a holiday and/or weekend, report directly to your nearest ER or you may call [phone] and ask to page the Pediatric Neurosurgery Resident on call.

## 2024-03-08 ENCOUNTER — TRANSFERRED RECORDS (OUTPATIENT)
Dept: HEALTH INFORMATION MANAGEMENT | Facility: CLINIC | Age: 34
End: 2024-03-08

## 2024-03-11 NOTE — PROGRESS NOTES
It was a pleasure virtually seeing Paula Running in clinic today. She was last seen on 12/12/23.  She is a pleasant 33 year old who presented to us initially with reported finding of papilledema and headaches.  It sounds like she had been having the symptoms since 2017, with papilledema reported that time, and placed on Diamox.  She also complained of blackout spells.  Her headaches are not classic for Chiari type headaches.  Her MRI scan revealed descent of cerebellar tonsils about 8 mm below the foramen magnum line, with a rounded appearance and no significant crowding and ample CSF spaces at the level of the FM.  To further evaluate for evidence of intracranial hypertension, we sent her for evaluation by ophthalmology and no papilledema was noted. She has not had an LP.       She was seen in December with several changes and concerns for CSF leaking as well as nerves eroding into her oropharynx.  She has been having pain along her face and in the back of her jaw.  This is associated with pressure pain behind her eye.  She also complains of tinnitus,.  Most of her symptoms occur on the right side of her face but sometimes on the left side.  She has been having problems in her oropharynx and noted something white in her mucus that she felt that a nerve that popped out.  He has seen ENT and was told she suffers from GERD but they did not feel she had an exposed nerve within her oropharynx.  She was evaluated by Dr. Espinal in ophthalmology in the past and there was no papilledema noted on exam.  She has been prescribed Diamox to take 1000 g/day due to symptoms of IIH.  She has not had a lumbar puncture to document high opening pressure and I cannot find a note clearly documenting papilledema      She reports that she was recently diagnosed with [place] syndrome by ENT.  She had a recent episode that she feels may have been seizure like and she is not sure and will be discussing with neurologist.     She is interested in  knowing if her symptoms are related to either intracranial hypertension or intracranial hypotension.      She did not obtain any imaging studies today.      Impression: Chiari malformation, likely incidental, question of IIH with no clear documentation of intracranial hypertension or papilledema, on diamox, other pain symptoms of unclear etiology     Plan: We discussed again that is unclear if she is having symptoms that are associated or caused by high ICP or possibly low ICP as she has been concerned in the past with leaking of CSF. We discussed that it is difficult to know what her ICP is and that taking diamox would likely lower it even if it were normal. We discussed options for measurement of ICP that include direct ICP monitoring versus LP. We discussed that LP can lead to false measurements depending on factors such as positioning and pCO2 at the time of the measurement. She would like to undergo LP and I think this is reasonable. She is weanng diamox and would like to have the study prior to completion of the wean. We discussed that it is reasonable and it will be helpful if the ICP is high on diamox and that it is possible that she may require another LP in the future completely off diamox in order to know the true ICP. We will work to get this scheduled.

## 2024-03-20 ENCOUNTER — APPOINTMENT (OUTPATIENT)
Dept: CT IMAGING | Facility: CLINIC | Age: 34
End: 2024-03-20
Attending: FAMILY MEDICINE
Payer: COMMERCIAL

## 2024-03-20 ENCOUNTER — HOSPITAL ENCOUNTER (EMERGENCY)
Facility: CLINIC | Age: 34
Discharge: HOME | End: 2024-03-20
Attending: FAMILY MEDICINE | Admitting: FAMILY MEDICINE
Payer: COMMERCIAL

## 2024-03-20 VITALS
WEIGHT: 224 LBS | TEMPERATURE: 98.3 F | BODY MASS INDEX: 40.97 KG/M2 | OXYGEN SATURATION: 100 % | RESPIRATION RATE: 18 BRPM | HEART RATE: 104 BPM | SYSTOLIC BLOOD PRESSURE: 121 MMHG | DIASTOLIC BLOOD PRESSURE: 77 MMHG

## 2024-03-20 DIAGNOSIS — G93.5 CHIARI I MALFORMATION (H): ICD-10-CM

## 2024-03-20 DIAGNOSIS — R51.9 FACIAL PAIN: ICD-10-CM

## 2024-03-20 PROCEDURE — 99283 EMERGENCY DEPT VISIT LOW MDM: CPT | Performed by: FAMILY MEDICINE

## 2024-03-20 PROCEDURE — 70486 CT MAXILLOFACIAL W/O DYE: CPT

## 2024-03-20 PROCEDURE — 99284 EMERGENCY DEPT VISIT MOD MDM: CPT | Mod: 25 | Performed by: FAMILY MEDICINE

## 2024-03-20 PROCEDURE — 70450 CT HEAD/BRAIN W/O DYE: CPT

## 2024-03-20 ASSESSMENT — COLUMBIA-SUICIDE SEVERITY RATING SCALE - C-SSRS
2. HAVE YOU ACTUALLY HAD ANY THOUGHTS OF KILLING YOURSELF IN THE PAST MONTH?: NO
1. IN THE PAST MONTH, HAVE YOU WISHED YOU WERE DEAD OR WISHED YOU COULD GO TO SLEEP AND NOT WAKE UP?: NO
6. HAVE YOU EVER DONE ANYTHING, STARTED TO DO ANYTHING, OR PREPARED TO DO ANYTHING TO END YOUR LIFE?: NO

## 2024-03-20 ASSESSMENT — ACTIVITIES OF DAILY LIVING (ADL)
ADLS_ACUITY_SCORE: 35
ADLS_ACUITY_SCORE: 35
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
## 2024-03-22 DIAGNOSIS — G93.2 INCREASED INTRACRANIAL PRESSURE: Primary | ICD-10-CM

## 2024-03-25 DIAGNOSIS — G93.5 CHIARI MALFORMATION TYPE I (H): Primary | ICD-10-CM

## 2024-03-25 DIAGNOSIS — G93.2 INCREASED INTRACRANIAL PRESSURE: ICD-10-CM

## 2024-03-26 DIAGNOSIS — G93.2 INCREASED INTRACRANIAL PRESSURE: Primary | ICD-10-CM

## 2024-03-26 DIAGNOSIS — G93.5 CHIARI MALFORMATION TYPE I (H): ICD-10-CM

## 2024-03-26 PROCEDURE — 99207: CPT | Performed by: NURSE PRACTITIONER

## 2024-03-27 DIAGNOSIS — G93.5 CHIARI MALFORMATION TYPE I (H): ICD-10-CM

## 2024-03-27 DIAGNOSIS — G93.2 INCREASED INTRACRANIAL PRESSURE: Primary | ICD-10-CM
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
## 2024-04-01 ENCOUNTER — OFFICE VISIT (OUTPATIENT)
Dept: NEUROLOGY | Facility: CLINIC | Age: 34
End: 2024-04-01
Payer: COMMERCIAL

## 2024-04-01 VITALS
BODY MASS INDEX: 41.22 KG/M2 | WEIGHT: 224 LBS | HEIGHT: 62 IN | HEART RATE: 116 BPM | DIASTOLIC BLOOD PRESSURE: 79 MMHG | SYSTOLIC BLOOD PRESSURE: 113 MMHG

## 2024-04-01 DIAGNOSIS — G93.2 PSEUDOTUMOR CEREBRI SYNDROME: Primary | ICD-10-CM

## 2024-04-01 DIAGNOSIS — R20.2 PARESTHESIAS: ICD-10-CM

## 2024-04-01 DIAGNOSIS — G93.5 CHIARI MALFORMATION TYPE I (H): ICD-10-CM

## 2024-04-01 PROCEDURE — 99215 OFFICE O/P EST HI 40 MIN: CPT | Performed by: PSYCHIATRY & NEUROLOGY

## 2024-04-01 PROCEDURE — G2211 COMPLEX E/M VISIT ADD ON: HCPCS | Performed by: PSYCHIATRY & NEUROLOGY

## 2024-04-01 NOTE — NURSING NOTE
Chief Complaint   Patient presents with    Follow Up     Discuss changing from diamox to topiramate due to gums sloughing. Scheduled for an LP on Friday. Currently tapering down on Diamox     Dottie Benson LPN on 4/1/2024 at 3:44 PM
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
## 2024-04-04 ENCOUNTER — TELEPHONE (OUTPATIENT)
Dept: INTERVENTIONAL RADIOLOGY/VASCULAR | Facility: CLINIC | Age: 34
End: 2024-04-04
Payer: COMMERCIAL

## 2024-04-16 ENCOUNTER — PRE VISIT (OUTPATIENT)
Dept: NEUROSURGERY | Facility: CLINIC | Age: 34
End: 2024-04-16

## 2024-04-16 ENCOUNTER — OFFICE VISIT (OUTPATIENT)
Dept: NEUROSURGERY | Facility: CLINIC | Age: 34
End: 2024-04-16
Payer: COMMERCIAL

## 2024-04-16 VITALS
HEART RATE: 122 BPM | HEIGHT: 62 IN | BODY MASS INDEX: 42.33 KG/M2 | WEIGHT: 230 LBS | DIASTOLIC BLOOD PRESSURE: 68 MMHG | OXYGEN SATURATION: 97 % | SYSTOLIC BLOOD PRESSURE: 114 MMHG

## 2024-04-16 DIAGNOSIS — R51.9 FACIAL PAIN: Primary | ICD-10-CM

## 2024-04-16 PROCEDURE — 99213 OFFICE O/P EST LOW 20 MIN: CPT | Mod: 4AS | Performed by: NEUROLOGICAL SURGERY

## 2024-04-16 ASSESSMENT — PAIN SCALES - GENERAL: PAINLEVEL: MODERATE PAIN (5)

## 2024-04-16 NOTE — LETTER
4/16/2024       RE: Paula Back  [address] [facility] [address]       Dear Colleague,    Thank you for referring your patient, Paula Back, to the [facility] NEUROSURGERY CLINIC [place] at [facility]. Please see a copy of my visit note below.    Neurosurgery clinic note    Ms. Paula Ruffin is a 33-year-old female with past medical history of chronic headaches thought to be due to idiopathic intracranial hypertension.  However, patient has not had a lumbar puncture for the diagnosis of IIH.  She was referred to our clinic for further evaluation of chronic right sided facial pain.  Today in clinic, we evaluated her for chronic pain which is unlikely to be due trigeminal neuralgia.  Her pain is a chronic dull aching type and mostly localized to the back of the head and into the mastoid process.  She denies shooting or stabbing type pain around her eye, sensitivity to touch. 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  Based on her presentation, we are convinced this is not related to trigeminal nerve problem.  We encouraged her to seek further treatment with ENT as she has an established diagnosis. All questions and concerns were addressed during this visit. We are available to address any further concerns she may have in the future regarding facial pain.     Plan  - Ok to discharge from clinic, follow-up PRN  - Continue further care with ENT regarding management of Mason's syndrome.     Case was discussed with Dr. Pichardo.  Approximately 30 minutes were spent during this clinic visit including coordination of care, chart review and documentation.    Mulugeta Oliver MD, MPH  Neuroendovascular Surgery Fellow  [facility]  Pager: [phone]        Again, thank you for allowing me to participate in the care of your patient.       Sincerely,    Cipriano Pichardo MD

## 2024-04-16 NOTE — PROGRESS NOTES
Neurosurgery clinic note    Ms. Paula Ruffin is a 33-year-old female with past medical history of chronic headaches thought to be due to idiopathic intracranial hypertension.  However, patient has not had a lumbar puncture for the diagnosis of IIH.  She was referred to our clinic for further evaluation of chronic right sided facial pain.  Today in clinic, we evaluated her for chronic pain which is unlikely to be due trigeminal neuralgia.  Her pain is a chronic dull aching type and mostly localized to the back of the head and into the mastoid process.  She denies shooting or stabbing type pain around her eye, sensitivity to touch. 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  Based on her presentation, we are convinced this is not related to trigeminal nerve problem.  We encouraged her to seek further treatment with ENT as she has an established diagnosis. All questions and concerns were addressed during this visit. We are available to address any further concerns she may have in the future regarding facial pain.     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     Case was discussed with Dr. Pichardo.  Approximately 30 minutes were spent during this clinic visit including coordination of care, chart review and documentation.    Mulugeta Oliver MD, MPH  Neuroendovascular Surgery Fellow  [facility]  Pager: [phone]

## 2024-04-17 ENCOUNTER — ANCILLARY PROCEDURE (OUTPATIENT)
Dept: INTERVENTIONAL RADIOLOGY/VASCULAR | Facility: CLINIC | Age: 34
End: 2024-04-17
Attending: NURSE PRACTITIONER
Payer: COMMERCIAL

## 2024-04-17 ENCOUNTER — LAB (OUTPATIENT)
Dept: LAB | Facility: CLINIC | Age: 34
End: 2024-04-17
Payer: COMMERCIAL

## 2024-04-17 VITALS
SYSTOLIC BLOOD PRESSURE: 115 MMHG | DIASTOLIC BLOOD PRESSURE: 73 MMHG | HEART RATE: 112 BPM | OXYGEN SATURATION: 99 % | TEMPERATURE: 98.6 F

## 2024-04-17 DIAGNOSIS — G93.2 INCREASED INTRACRANIAL PRESSURE: ICD-10-CM

## 2024-04-17 DIAGNOSIS — Z01.818 PRE-OP EXAM: ICD-10-CM

## 2024-04-17 DIAGNOSIS — Z01.818 PRE-OP EXAM: Primary | ICD-10-CM

## 2024-04-17 LAB
APPEARANCE CSF: CLEAR
COLOR CSF: COLORLESS
GLUCOSE CSF-MCNC: 69 MG/DL (ref 40–70)
HOLD SPECIMEN: NORMAL
HOLD SPECIMEN: NORMAL
INR PPP: 1.13 (ref 0.85–1.15)
PLATELET # BLD AUTO: 300 10E3/UL (ref 150–450)
PROT CSF-MCNC: 22.9 MG/DL (ref 15–45)
RBC # CSF MANUAL: 171 /UL (ref 0–2)
TUBE # CSF: 3
WBC # CSF MANUAL: 1 /UL (ref 0–5)

## 2024-04-17 PROCEDURE — 82945 GLUCOSE OTHER FLUID: CPT | Performed by: NURSE PRACTITIONER

## 2024-04-17 PROCEDURE — 84157 ASSAY OF PROTEIN OTHER: CPT | Performed by: NURSE PRACTITIONER

## 2024-04-17 PROCEDURE — 85049 AUTOMATED PLATELET COUNT: CPT | Performed by: PATHOLOGY

## 2024-04-17 PROCEDURE — 89050 BODY FLUID CELL COUNT: CPT | Performed by: NURSE PRACTITIONER

## 2024-04-17 PROCEDURE — 85610 PROTHROMBIN TIME: CPT | Performed by: PATHOLOGY

## 2024-04-17 PROCEDURE — 36415 COLL VENOUS BLD VENIPUNCTURE: CPT | Performed by: PATHOLOGY

## 2024-04-17 PROCEDURE — 99000 SPECIMEN HANDLING OFFICE-LAB: CPT | Performed by: PATHOLOGY

## 2024-04-17 PROCEDURE — 87070 CULTURE OTHR SPECIMN AEROBIC: CPT | Performed by: NURSE PRACTITIONER

## 2024-04-17 PROCEDURE — 62328 DX LMBR SPI PNXR W/FLUOR/CT: CPT

## 2024-04-17 NOTE — DISCHARGE INSTRUCTIONS
Discharge instructions for Lumbar Puncture           AFTER YOU GO HOME      Relax and take it easy for 24 hours  You may resume normal activity after the 24 hour period  You may develop a headache that will normally go away on its own in 1 to 2 days. Lying down should help relieve this pain.  If you develop a headache you can take over the counter medicines and lay down.    You can try drinking caffeine-coffee, soda if you develop a headache.   Drink at least 4 glasses of extra fluid today if not on a fluid restriction  Continue to take your medications as ordered by your provider  You may remove the bandage and resume bathing the next day  DO NOT drive or operate machinery at home or at work for at least 24 hours               CALL YOU PRIMARY PHYSICIAN IF:    Severe head or neck pain that doesn't go away or that gets worse  You feel less alert or have trouble waking up  Repeated upset stomach (nausea) or vomiting  Swelling, pain, bruising, or redness at the puncture site  Fever of 100.4 F (38 C) or higher, or as advised by your provider  If you start to leak a large amount of fluid from the puncture site (also, lie down flat on your back)      Date: 4/17  Provider: TONNY Quezada, Interventional Radiology, [facility]    [facility]  [address] [place] [id number]

## 2024-04-22 LAB
BACTERIA CSF CULT: NO GROWTH
GRAM STAIN RESULT: NORMAL
GRAM STAIN RESULT: NORMAL

## 2024-04-30 ENCOUNTER — TELEPHONE (OUTPATIENT)
Dept: NEUROSURGERY | Facility: CLINIC | Age: 34
End: 2024-04-30
Payer: COMMERCIAL

## 2024-04-30 NOTE — TELEPHONE ENCOUNTER
VICKY Irene appointment will have to be rescheduled from today as Dr Olivares is in the OR. Reschedule for next available with. Can be virtually or in person. thanks

## 2024-05-02 ENCOUNTER — TELEPHONE (OUTPATIENT)
Dept: NEUROSURGERY | Facility: CLINIC | Age: 34
End: 2024-05-02
Payer: COMMERCIAL

## 2024-05-02 NOTE — TELEPHONE ENCOUNTER
Left Voicemail (1st Attempt) for the patient to call back and schedule the following:    Appointment type: Return adult neurosg  Provider: Dr Olivares  Return date: First available  Specialty phone number: [phone]  Additional appointment(s) needed: na  Additonal Notes: Nothing available sooner. Schedule first available and offer the waitlist.

## 2024-06-18 ENCOUNTER — MYC MEDICAL ADVICE (OUTPATIENT)
Dept: NEUROLOGY | Facility: CLINIC | Age: 34
End: 2024-06-18
Payer: COMMERCIAL

## 2024-06-19 NOTE — TELEPHONE ENCOUNTER
The Topamax dose that we used was very low and it appears that with the significant side effects you are very sensitive to that medication.  You should stop the Topamax.  I do not have any other medication recommendations at this time.  Should follow any new recommendations from the neurosurgeons.  divya Bautista MD on 6/19/2024 at 4:29 PM

## 2024-06-23 ENCOUNTER — HEALTH MAINTENANCE LETTER (OUTPATIENT)
Age: 34
End: 2024-06-23

## 2024-06-27 ENCOUNTER — MYC MEDICAL ADVICE (OUTPATIENT)
Dept: NEUROLOGY | Facility: CLINIC | Age: 34
End: 2024-06-27
Payer: COMMERCIAL

## 2024-06-28 NOTE — TELEPHONE ENCOUNTER
I do not currently have any new recommendations for medications at this time.  divya Bautista MD on 6/28/2024 at 9:11 AM

## 2024-07-03 ENCOUNTER — OFFICE VISIT (OUTPATIENT)
Dept: NEUROSURGERY | Facility: CLINIC | Age: 34
End: 2024-07-03
Attending: NEUROLOGICAL SURGERY
Payer: COMMERCIAL

## 2024-07-03 VITALS
SYSTOLIC BLOOD PRESSURE: 116 MMHG | BODY MASS INDEX: 35.59 KG/M2 | WEIGHT: 240.3 LBS | HEIGHT: 69 IN | DIASTOLIC BLOOD PRESSURE: 80 MMHG | HEART RATE: 90 BPM

## 2024-07-03 DIAGNOSIS — G93.5 CHIARI MALFORMATION TYPE I (H): Primary | ICD-10-CM

## 2024-07-03 PROCEDURE — 99214 OFFICE O/P EST MOD 30 MIN: CPT | Performed by: NEUROLOGICAL SURGERY

## 2024-07-03 PROCEDURE — 99213 OFFICE O/P EST LOW 20 MIN: CPT | Performed by: NEUROLOGICAL SURGERY

## 2024-07-03 ASSESSMENT — PAIN SCALES - GENERAL: PAINLEVEL: SEVERE PAIN (7)

## 2024-07-03 NOTE — LETTER
7/3/2024      RE: Paula Back  [address] [facility] [address]     Dear Colleague,    Thank you for the opportunity to participate in the care of your patient, Paula Back, at the [facility] at [facility]. Please see a copy of my visit note below.    It was a pleasure seeing Paula Back in clinic today. She was last seen last on 3/5/24 virtually.  She is a 33 year old who presented to us initially with reported finding of papilledema and headaches.  She has been having the symptoms since 2017, with report of papilledema reported that time, and placed on Diamox.  She also complained of blackout spells.  Her headaches are not classic for Chiari type headaches.  Her MRI scan revealed descent of cerebellar tonsils about 8 mm below the foramen magnum line, with a rounded appearance and no significant crowding and ample CSF spaces at the level of the FM.  To further evaluate for evidence of intracranial hypertension, we sent her for evaluation by ophthalmology and no papilledema was noted.      She was seen in December with several changes and concerns for CSF leaking as well as nerves eroding into her oropharynx.  She has been having pain along her face and in the back of her jaw.  This is associated with pressure pain behind her eye.  She has seen Dr. Pichardo.  She also follows with ENT. She was evaluated by Dr. Espinal in ophthalmology in the past and there was no papilledema noted on exam.  She has been prescribed Diamox to take 1000 g/day due to symptoms of IIH.  She has since weaned that off. She reports that she was recently diagnosed with [place] syndrome by ENT.  She had a recent episode that she feels may have been seizure like and she is not sure and will be discussing with neurologist.      When seen by me on 3/5/24, she was interested in knowing if her symptoms are related to either intracranial  hypertension or intracranial hypotension. We discussed options and recommended that she obtain LP to measure OP while off diamox.     She underwent LP, nonsedated, off diamox, with OP reported at 22 cm H2O.  She reports feeling relief for about 2 weeks followign drainage of 10cc.  She felt her vision was improved and pain improved. She continues to have pain at the base of her skull.      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  We discussed that her chiari malformation is very mild, with ample CSF at the foramen magnum, and that I would not recommend a Chiari decompression, especially given the possibility of intracranial hypertension.  We discussed other options including placement of an intracranial pressure monitor with observation for least 24 hours to see what her true intracranial pressure is.  We discussed options for doing's including placing a parenchymal transducer versus an external ventricular drain, which would allow us opportunity to drain fluid.  She would like to proceed with placement of external ventricular drain in order to monitor her pressure and to see if any of her symptoms improve with drainage.  We will set this up.  We discussed the risks of the procedure including infection and bleeding she like to proceed.         Please do not hesitate to contact me if you have any questions/concerns.     Sincerely,       Micheal Olivares MD

## 2024-07-03 NOTE — NURSING NOTE
"[facility] [[id number]]  Chief Complaint   Patient presents with    RECHECK     Cervical radiculopathy.     Initial /80   Pulse 90   Ht 5' 9.02\" (175.3 cm)   Wt 240 lb 4.8 oz (109 kg)   BMI 35.47 kg/m   Estimated body mass index is 35.47 kg/m  as calculated from the following:    Height as of this encounter: 5' 9.02\" (175.3 cm).    Weight as of this encounter: 240 lb 4.8 oz (109 kg).  Medication Reconciliation: complete    Does the patient need any medication refills today? No    Does the patient/parent need MyChart or Proxy acces today? No      Airam Espinal                "

## 2024-07-03 NOTE — PATIENT INSTRUCTIONS
You met with Pediatric Neurosurgery at the [facility]    [address] Dr., NP      Pediatric Appointment Scheduling and Call Center:   [phone]    Nurse Practitioner  [phone]    Mailing Address  [address] [address]    Street Address   [address] [place] [address]    Fax Number  [phone]    For urgent matters that cannot wait until the next business day, occur over a holiday and/or weekend, report directly to your nearest ER or you may call [phone] and ask to page the Pediatric Neurosurgery Resident on call.

## 2024-07-08 ENCOUNTER — PREP FOR PROCEDURE (OUTPATIENT)
Dept: NEUROSURGERY | Facility: CLINIC | Age: 34
End: 2024-07-08

## 2024-07-08 DIAGNOSIS — G93.5 CHIARI MALFORMATION TYPE I (H): Primary | ICD-10-CM

## 2024-07-08 NOTE — PROGRESS NOTES
It was a pleasure seeing Paula Back in clinic today. She was last seen last on 3/5/24 virtually.  She is a 33 year old who presented to us initially with reported finding of papilledema and headaches.  She has been having the symptoms since 2017, with report of papilledema reported that time, and placed on Diamox.  She also complained of blackout spells.  Her headaches are not classic for Chiari type headaches.  Her MRI scan revealed descent of cerebellar tonsils about 8 mm below the foramen magnum line, with a rounded appearance and no significant crowding and ample CSF spaces at the level of the FM.  To further evaluate for evidence of intracranial hypertension, we sent her for evaluation by ophthalmology and no papilledema was noted.      She was seen in December with several changes and concerns for CSF leaking as well as nerves eroding into her oropharynx.  She has been having pain along her face and in the back of her jaw.  This is associated with pressure pain behind her eye.  She has seen Dr. Pichardo.  She also follows with ENT. She was evaluated by Dr. Espinal in ophthalmology in the past and there was no papilledema noted on exam.  She has been prescribed Diamox to take 1000 g/day due to symptoms of IIH.  She has since weaned that off. She reports that she was recently diagnosed with [place] syndrome by ENT.  She had a recent episode that she feels may have been seizure like and she is not sure and will be discussing with neurologist.      When seen by me on 3/5/24, she was interested in knowing if her symptoms are related to either intracranial hypertension or intracranial hypotension. We discussed options and recommended that she obtain LP to measure OP while off diamox.     She underwent LP, nonsedated, off diamox, with OP reported at 22 cm H2O.  She reports feeling relief for about 2 weeks followign drainage of 10cc.  She felt her vision was improved and pain improved. She continues to have pain at the  base of her skull.      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  We discussed that her chiari malformation is very mild, with ample CSF at the foramen magnum, and that I would not recommend a Chiari decompression, especially given the possibility of intracranial hypertension.  We discussed other options including placement of an intracranial pressure monitor with observation for least 24 hours to see what her true intracranial pressure is.  We discussed options for doing's including placing a parenchymal transducer versus an external ventricular drain, which would allow us opportunity to drain fluid.  She would like to proceed with placement of external ventricular drain in order to monitor her pressure and to see if any of her symptoms improve with drainage.  We will set this up.  We discussed the risks of the procedure including infection and bleeding she like to proceed.

## 2024-07-10 ENCOUNTER — TELEPHONE (OUTPATIENT)
Dept: NEUROSURGERY | Facility: CLINIC | Age: 34
End: 2024-07-10
Payer: COMMERCIAL

## 2024-07-10 NOTE — TELEPHONE ENCOUNTER
Scheduled surgery with Dr. Olivares on 8/15/2024    Spoke with: Patient    Surgery is located at [facility]    Patient will be seen for their H&P by Virtual PAC on 8/1/2024 at 11am - Patient is aware they need to be in the state of [place]    Does patient need a consult before upcoming surgery? No    Anesthesia type: General    Requested Imaging required for surgery: Yes: Stealth CT prior to OR - need orders - will call imaging once they are placed    Patient is scheduled for their 2 week post op on 8/29/2024 at 930am with Misa Andrews NP    Patient will receive their surgery packet via [employer] per their preference    Patient was not provided a start time for surgery & is aware they will receive this information at their PAC appointment as well as any pre-op instructions.    Additional comments: Patient was instructed to call back with any further questions or concerns.     Pat Land on 7/10/2024 at 10:22 AM

## 2024-07-10 NOTE — TELEPHONE ENCOUNTER
Attempted to leave patient a voicemail but her mailbox is full. Will send mora Land on 7/10/2024 at 8:42 AM

## 2024-07-11 DIAGNOSIS — H47.10 PAPILLEDEMA: ICD-10-CM

## 2024-07-11 DIAGNOSIS — G93.2 PSEUDOTUMOR CEREBRI SYNDROME: Primary | ICD-10-CM

## 2024-07-11 NOTE — TELEPHONE ENCOUNTER
[place] [facility] [place] at 7am for a 730am surgery    Ashley Leon  7/11/2024 at 1:49 PM

## 2024-07-12 NOTE — TELEPHONE ENCOUNTER
FUTURE VISIT INFORMATION      SURGERY INFORMATION:  Date: 8/15/24  Location: uu or  Surgeon:  Micheal Olivares MD   Anesthesia Type:  general  Procedure: VENTRICULOSTOMY, CEREBRAL VENTRICLE, USING OPTICAL TRACKING SYSTEM   Consult: ov 7/3/24    RECORDS REQUESTED FROM:       Primary Care Provider: Rigoberto French MD - [facility]    Most recent EKG+ Tracin22

## 2024-07-18 ENCOUNTER — TELEPHONE (OUTPATIENT)
Dept: NEUROSURGERY | Facility: CLINIC | Age: 34
End: 2024-07-18
Payer: COMMERCIAL

## 2024-07-18 NOTE — TELEPHONE ENCOUNTER
Left Voicemail (1st Attempt) and Sent Mychart (1st Attempt) for the patient to call back and schedule the following:    Appointment type: return peds neurosg  Provider: Misa Andrews  Return date: Around August 29th  Specialty phone number: [phone]  Additional appointment(s) needed:   Additonal Notes: Resched August 29th.

## 2024-08-01 ENCOUNTER — PRE VISIT (OUTPATIENT)
Dept: SURGERY | Facility: CLINIC | Age: 34
End: 2024-08-01

## 2024-08-01 ENCOUNTER — VIRTUAL VISIT (OUTPATIENT)
Dept: SURGERY | Facility: CLINIC | Age: 34
End: 2024-08-01
Payer: COMMERCIAL

## 2024-08-01 ENCOUNTER — ANESTHESIA EVENT (OUTPATIENT)
Dept: SURGERY | Facility: CLINIC | Age: 34
End: 2024-08-01
Payer: COMMERCIAL

## 2024-08-01 VITALS — BODY MASS INDEX: 42.33 KG/M2 | HEIGHT: 62 IN | WEIGHT: 230 LBS

## 2024-08-01 DIAGNOSIS — Z01.818 PREOP EXAMINATION: Primary | ICD-10-CM

## 2024-08-01 DIAGNOSIS — G93.5 CHIARI MALFORMATION TYPE I (H): ICD-10-CM

## 2024-08-01 PROCEDURE — 99203 OFFICE O/P NEW LOW 30 MIN: CPT | Mod: 95 | Performed by: CLINICAL NURSE SPECIALIST

## 2024-08-01 RX ORDER — LIRAGLUTIDE 6 MG/ML
1.2 INJECTION, SOLUTION SUBCUTANEOUS EVERY MORNING
Status: ON HOLD | COMMUNITY
Start: 2024-07-19 | End: 2024-08-15

## 2024-08-01 RX ORDER — PROPRANOLOL HYDROCHLORIDE 10 MG/1
1 TABLET ORAL EVERY MORNING
COMMUNITY
Start: 2024-07-12

## 2024-08-01 ASSESSMENT — PAIN SCALES - GENERAL: PAINLEVEL: SEVERE PAIN (6)

## 2024-08-01 ASSESSMENT — LIFESTYLE VARIABLES: TOBACCO_USE: 1

## 2024-08-01 ASSESSMENT — ENCOUNTER SYMPTOMS: SEIZURES: 0

## 2024-08-01 NOTE — PATIENT INSTRUCTIONS
Preparing for Your Surgery      Name:  Paula Back   MRN:  [id number]   :  1990   Today's Date:  2024       Arriving for surgery:  Surgery date:  8/15/24  Arrival time:  5.30AM    Please come to:     Please come to:       [email or web address]    [address] [place]  [address]     The [facility] [place] Patient/Visitor Ramp is at [address]. Patients and visitors who self-park will receive the reduced hospital parking rate. If the Patient /Visitor Ramp is full, please follow the signs to the [employer] car park located at the main hospital entrance.       parking is available (24 hours/ 7 days a week)      Discounted parking pass options are available for patients and visitors. They can be purchased at the [place] desk at the main hospital entrance.     -    Stop at the security desk and they will direct surgery patients to the Surgery Check in and [facility] [place] [place] [phone]        - If you need directions, a wheelchair or an escort please stop at the Information/security desk in the lobby.     What can I eat or drink?  -  You may eat and drink normally up to 8 hours prior to arrival time. (Until 9.30PM)  -  You may have clear liquids until 2 hours prior to arrival time. (Until 3.30AM)    Examples of clear liquids:  Water  Clear broth  Juices (apple, white grape, white cranberry  and cider) without pulp  Noncarbonated, powder based beverages  (lemonade and Jimmy-Aid)  Sodas (Sprite, 7-Up, ginger ale and seltzer)  Coffee or tea (without milk or cream)  Gatorade    -  No Alcohol or cannabis products for at least 24 hours before surgery.     Which medicines can I take?    Hold Aspirin for 7 days before surgery.   Hold Multivitamins for 7 days before surgery.  Hold Supplements for 7 days before surgery.  Hold Ibuprofen (Advil, Motrin) for 1 day(s) before surgery--unless otherwise  directed by surgeon.  Hold Naproxen (Aleve) for 4 days before surgery.    Hold Saxenda for 24 hours before surgery    -  DO NOT take these medications the day of surgery:  Continue to hold Saxenda.  Adderall  External gel       -  PLEASE TAKE these medications the day of surgery:  Tylenol and Tramadol as needed  Vilazodone (Viibryd)      How do I prepare myself?  - Please take 2 showers (one the night prior to surgery and one the morning of surgery) using Scrubcare or Hibiclens soap.    Use this soap only from the neck to your toes.     Leave the soap on your skin for one minute--then rinse thoroughly.      You may use your own shampoo and conditioner. No other hair products.   - Please remove all jewelry and body piercings.  - No lotions, deodorants or fragrance.  - No makeup or fingernail polish.   - Bring your ID and insurance card.    -If you use a CPAP machine, please bring the CPAP machine, tubing, and mask to hospital.    -If you have a Deep Brain Stimulator, Spinal Cord Stimulator, or any Neuro Stimulator device---you must bring the remote control to the hospital.      ALL PATIENTS GOING HOME THE SAME DAY OF SURGERY ARE REQUIRED TO HAVE A RESPONSIBLE ADULT TO DRIVE AND BE IN ATTENDANCE WITH THEM FOR 24 HOURS FOLLOWING SURGERY.    Covid testing policy as of 12/06/2022  Your surgeon will notify and schedule you for a COVID test if one is needed before surgery--please direct any questions or COVID symptoms to your surgeon      Questions or Concerns:    - For any questions regarding the day of surgery or your hospital stay, please contact the Pre Admission Nursing Office at [phone].       - If you have health changes between today and your surgery, please call your surgeon.       - For questions after surgery, please call your surgeons office.           Current Visitor Guidelines    You may have 2 visitors in the pre op area.    Visiting hours: 8 a.m. to 8:30 p.m.    Patients confirmed or suspected to have  symptoms of COVID 19 or flu:     No visitors allowed for adult patients.   Children (under age 18) can have 1 named visitor.     People who are sick or showing symptoms of COVID 19 or flu:    Are not allowed to visit patients--we can only make exceptions in special situations.       Please follow these guidelines for your visit:          Please maintain social distance          Masking is optional--however at times you may be asked to wear a mask for the safety of yourself and others     Clean your hands with alcohol hand . Do this when you arrive at and leave the building and patient room,    And again after you touch your mask or anything in the room.     Go directly to and from the room you are visiting.     Stay in the patient s room during your visit. Limit going to other places in the hospital as much as possible     Leave bags and jackets at home or in the car.     For everyone s health, please don t come and go during your visit. That includes for smoking   during your visit.

## 2024-08-01 NOTE — PROGRESS NOTES
Paula is a 34 year old who is being evaluated via a billable video visit.    How would you like to obtain your AVS? MyChart  If the video visit is dropped, the invitation should be resent by: Text to cell phone: [phone]    Jair Mitchell is a 34 year old, presenting for the following health issues:  Pre-Op Exam    HPI           Physical Exam

## 2024-08-01 NOTE — H&P
"  Pre-Operative H & P     CC:  Preoperative exam to assess for increased cardiopulmonary risk while undergoing surgery and anesthesia.    Date of Encounter: 8/1/2024  Primary Care Physician:  Rigoberto French     Reason for visit:   Encounter Diagnoses   Name Primary?    Preop examination Yes    Chiari malformation type I (H)        JAQUELIN Back is a 34 year old female who presents for pre-operative H & P in preparation for  Procedure Information       Case: [id number] Date/Time: 08/15/24 0730    Procedure: VENTRICULOSTOMY, CEREBRAL VENTRICLE, USING OPTICAL TRACKING SYSTEM (Right: Head)    Anesthesia type: General    Diagnosis: Chiari malformation type I (H) [G93.5]    Pre-op diagnosis: Chiari malformation type I (H) [G93.5]    Location:  OR [address] [place] [address] OR    Providers: Micheal Olivares MD          History is obtained from the patient and chart review    Patient who has been followed by Dr. Olivares, presenting initially with reported findings of papilledema and headaches with symptoms since 2017.  She was placed on Diamox for a time. She underwent further workup with MRI which revealed descent of cerebellar tonsils about 8 mm below the foramen magnum line, with a rounded appearance and no significant crowding and ample CSF spaces at the level of the FM. To further evaluate for evidence of intracranial hypertension, she was further evaluated bby ophthalmology and no papilledema was noted. She has daily headaches with pressure behind her eyes, pain in her neck and some lightheadedness. She describes \"black out\" spells where she will lose vision for ~30 sec.       In follow up patient was counseled by Dr. Olivares that with no clear documentation of intracranial hypertension or papilledema and mild Chiari malformation, a Chiari decompression was not recommended, especially given the possibility of intracranial hypertension. Other options were discussed including placement of an intracranial pressure " monitor with observation for least 24 hours to see what her true intracranial pressure is.  We discussed options including placing a parenchymal transducer versus an external ventricular drain, which would allow us opportunity to drain fluid. She wished to proceed.     Her history is otherwise significant for class 3 obesity, anxiety, depression, and ADHD      Hx of abnormal bleeding or anti-platelet use: Denies    Menstrual history: Patient's last menstrual period was 07/11/2024 (approximate).     Past Medical History  Past Medical History:   Diagnosis Date    ADHD (attention deficit hyperactivity disorder)     Chiari malformation type I (H)     Depression     NOEL (generalized anxiety disorder)     Insomnia     Papilledema     Pseudotumor cerebri syndrome     TMJ (temporomandibular joint syndrome)        Past Surgical History  Past Surgical History:   Procedure Laterality Date    IR LUMBAR PUNCTURE  11/22/2023    IR LUMBAR PUNCTURE  12/07/2023    LASIK      Resection of elongated styloid      TONSILLECTOMY      TUBAL LIGATION         Prior to Admission Medications  Current Outpatient Medications   Medication Sig Dispense Refill    acetaminophen (TYLENOL) 325 MG tablet Take 650 mg by mouth      amphetamine-dextroamphetamine (ADDERALL XR) 30 MG 24 hr capsule Take 20 mg by mouth every morning      amphetamine-dextroamphetamine (ADDERALL XR) 5 MG 24 hr capsule Take 5 mg by mouth every morning      clindamycin (CLINDAMAX) 1 % external gel       DULoxetine (CYMBALTA) 30 MG capsule Take 1 capsule by mouth every evening      ibuprofen (ADVIL/MOTRIN) 200 MG tablet every 6 hours      LORazepam (ATIVAN) 0.5 MG tablet Take 1 tablet by mouth daily as needed      propranolol (INDERAL) 10 MG tablet Take 1 tablet by mouth every morning      SAXENDA 18 MG/3ML pen Inject 1.2 mg subcutaneously every morning      topiramate (TOPAMAX) 25 MG tablet Start with 25 mg once per day at nightThen after 2 weeks could go to twice daily.  Then  "after 2 weeks can go to 50 mg twice per day then wait at this dose for a bit to have its effect.  Stopping along the way if any increased side effects or problems (Patient taking differently: Take 25 mg by mouth at bedtime Start with 25 mg once per day at nightThen after 2 weeks could go to twice daily.  Then after 2 weeks can go to 50 mg twice per day then wait at this dose for a bit to have its effect.  Stopping along the way if any increased side effects or problems) 120 tablet 5    traMADol (ULTRAM) 50 MG tablet Take 50 mg by mouth      traZODone (DESYREL) 50 MG tablet Take 50 mg by mouth at bedtime      vilazodone (VIIBRYD) 20 MG TABS tablet Take 20 mg by mouth every morning         Allergies  Allergies   Allergen Reactions    Fentanyl Nausea and Vomiting, Nausea and Other (See Comments)     Patient reports this does not help with pain and only gives \"high\" feeling    Does not help with pain, only makes pt feel \"high\"       Social History  Social History     Socioeconomic History    Marital status:      Spouse name: Not on file    Number of children: Not on file    Years of education: Not on file    Highest education level: Not on file   Occupational History    Not on file   Tobacco Use    Smoking status: Former     Current packs/day: 0.25     Types: Cigarettes     Passive exposure: Past    Smokeless tobacco: Never    Tobacco comments:     she is not exposed to tobacco at home   Substance and Sexual Activity    Alcohol use: Not Currently    Drug use: Yes     Comment: uses medical THC edibles or vape    Sexual activity: Yes     Partners: Male     Birth control/protection: Condom, Injection   Other Topics Concern    Parent/sibling w/ CABG, MI or angioplasty before 65F 55M? No   Social History Narrative    Works at [employer] on 4:30-1 AM shift     Social Determinants of Health     Financial Resource Strain: Low Risk  (1/5/2024)    Received from [facility], [facility]    Financial Resource Strain     Difficulty of Paying Living Expenses: 3     Difficulty of Paying Living Expenses: Not on file   Food Insecurity: No Food Insecurity (1/5/2024)    Received from [facility], [facility]    Food Insecurity     Worried About Running Out of Food in the Last Year: 1   Transportation Needs: No Transportation Needs (1/5/2024)    Received from [facility], [facility]    Transportation Needs     Lack of Transportation (Medical): 1   Physical Activity: Not on file   Stress: Not on file   Social Connections: Socially Isolated (1/5/2024)    Received from [facility], [facility]    Social Connections     Frequency of Communication with Friends and Family: 4   Interpersonal Safety: Not on file   Housing Stability: Low Risk  (1/5/2024)    Received from [facility], [facility]    Housing Stability     Unable to Pay for Housing in the Last Year: 1       Family History  Family History   Problem Relation Age of Onset    Alcohol/Drug Mother     Alcohol/Drug Father     Cancer Maternal Grandmother         lung     Diabetes Maternal Grandfather     Cancer Maternal Grandfather         father    Anesthesia Reaction No family hx of     Bleeding Disorder No family hx of     Clotting Disorder No family hx of        Review of Systems  The complete review of systems is negative other than noted in the HPI or here.   Anesthesia Evaluation   Pt has had prior anesthetic. Type: General and MAC (Patient is asking to avoid Fentanyl-on allergy list).    No history of anesthetic complications       ROS/MED HX  ENT/Pulmonary: Comment: TMJ with mild symptoms    (+)     HUNTER risk factors,   obese,        tobacco use, Past  use,                    (-) recent URI   Neurologic: Comment: Chiari malformation, type 1  IIH  Daily headaches  ADHD    One seizure like event 6 months ago, not formally diagnosed, no recurrence     (-) no seizures and no CVA   Cardiovascular: Comment: Reports wide BP swings, some chest pain and irreg HR with high BP    (+)  - -   -  - -           ORR.               Irregular Heartbeat/Palpitations,       Previous cardiac testing   Echo: Date: Results:    Stress Test:  Date: Results:    ECG Reviewed:  Date:  Results:  SR  Cath:  Date: Results:   (-) taking anticoagulants/antiplatelets   METS/Exercise Tolerance: 4 - Raking leaves, gardening    Hematologic:    (-) history of blood clots and history of blood transfusion   Musculoskeletal:       GI/Hepatic:    (-) GERD   Renal/Genitourinary:  - neg Renal ROS     Endo: Comment: Saxenda for weight loss management    (+)               Obesity,       Psychiatric/Substance Use: Comment: Tramadol daily  Vaping THC and edibles    (+) psychiatric history anxiety and depression       Infectious Disease:  - neg infectious disease ROS     Malignancy:  - neg malignancy ROS     Other:  - neg other ROS    (+)  , H/O Chronic Pain,         Virtual visit -  No vitals were obtained    Physical Exam  Constitutional: Awake, alert, no apparent distress, and appears stated age.  HENT: Normocephalic  Respiratory: non labored breathing: no cough   Neurologic: Oriented to name, place and time.   Neuropsychiatric: Calm, cooperative. Normal affect.      Prior Labs/Diagnostic Studies   All labs and imaging personally reviewed   24   Platelet count 300  INR 1.13    3/21/24   WBC 6.4  Hgb 12.9  Hematocrit 38.8    24  Na 140  K 4.0  Cl 111  Glu 104  Cr  0.72    EK Sinus rhythm    3/8/24 MRV Brain    TEMPORAL BONE CT:   1.  Unremarkable temporal bone CT.     HEAD MRA:   1. Unremarkable intracranial vasculature.     HEAD MRV:   1. Unremarkable dural venous sinuses     CT Head  "3/20/24                                              IMPRESSION:  1. No CT findings of acute intracranial process.  2. Unchanged cerebellar tonsillar ectopia, concerning for Chiari I  malformation.    The patient's records and results personally reviewed by this provider.     Outside records reviewed from: Care Everywhere      Assessment    Paula Back is a 34 year old female seen as a PAC referral for risk assessment and optimization for anesthesia.    Plan/Recommendations  Pt will be optimized for the proposed procedure.  See below for details on the assessment, risk, and preoperative recommendations    ANESTHESIA CONCERN: Patient requests that she NOT receive Fentanyl. It is on her allergy list. She states that she does not do well with this, it does not help with pain, but makes her feel \"high\". Final counseling and decisions by Anesthesia on DOS    NEUROLOGY  Denies stroke history.  Reports one seizure like event ~6 months ago, not formally diagnosed. No recurrence  Chiari malformation type 1  Possible intracranial HYPERTENSION. Has daily headaches with pressure behind eyes, neck pain, and lightheadedness. Topamax at HS.  Chronic Pain. Tramadol prn and uses daily. Duloxetine at HS.   ADHD. Will hold Adderall on DOS. Propranolol taken to combat side effects of Adderall.   -Post Op delirium risk factors:  High co-morbid index    ENT  - No current airway concerns.  Will need to be reassessed day of surgery.  Mallampati: Unable to assess  TM: Unable to assess  Mild TMJ symptoms. No history of locking.     CARDIAC  No cardiac history or meds. Reports wide BP swings occasionally and will have chest pain and irregular HR when BP is high. No other cardiac history. Is able to walk some distance, but activity is limited by symptoms.   - METS (Metabolic Equivalents)<4    RCRI: 0.4% risk of serious cardiac events    PULMONARY  Denies asthma, cough or use of inhaler  Low risk for HUNTER  - Tobacco History    History " "  Smoking Status    Former    Types: Cigarettes   Smokeless Tobacco    Never       GI: Denies GERD  PONV Medium Risk  Total Score: 2           1 AN PONV: Pt is Female    1 AN PONV: Patient is not a current smoker        /RENAL  - Baseline Creatinine  0.72    ENDOCRINE   - BMI: Estimated body mass index is 42.05 kg/m  as calculated from the following:    Height as of this encounter: 1.575 m (5' 2.01\").    Weight as of this encounter: 104.3 kg (230 lb).  Class 3 Obesity (BMI > 40)  Saxenda for weight loss daily. Will hold day before and day of surgery.  - No history of Diabetes Mellitus  Last A1C 5.2 on 1/24/24    HEME  VTE Low Risk 0.26%             Total Score: 0      Denies personal or family history of blood clots  Denies personal or family history of bleeding disorder  Denies history of blood transfusion     MSK: Frailty score 0/5    PSYCH  - Anxiety/depression.  PRN use of Ativan  Will take Viibryd on DOS    Will hold THC products 24 hours prior to surgery.     Different anesthesia methods/types have been discussed with the patient, but they are aware that the final plan will be decided by the assigned anesthesia provider on the date of service.    The patient is optimized for their procedure. AVS with information on surgery time/arrival time, meds and NPO status given by nursing staff. No further diagnostic testing indicated.    Surgery team has placed multiple DOS labs    Please refer to the physical examination documented by the anesthesiologist in the anesthesia record on the day of surgery.    Video-Visit Details    Type of service:  Video Visit    Provider received verbal consent for a Video Visit from the patient? Yes     Originating Location (pt. Location): Home    Distant Location (provider location):  Off-site  Mode of Communication:  Video Conference via [employer]  On the day of service:     Prep time: 15 minutes  Visit time: 14 minutes  Documentation time: 12 " minutes  ------------------------------------------  Total time: 41 minutes      BIRGIT Iqbal CNS  Preoperative Assessment Center  [facility]  Phone: [phone]  Fax: [phone]

## 2024-08-06 ENCOUNTER — TELEPHONE (OUTPATIENT)
Dept: NEUROSURGERY | Facility: CLINIC | Age: 34
End: 2024-08-06
Payer: COMMERCIAL

## 2024-08-06 NOTE — TELEPHONE ENCOUNTER
Left Voicemail (1st Attempt) and Sent Mychart (1st Attempt) for the patient to call back and schedule the following:     Appointment type: return peds neurosg  Provider: Misa Andrews  Return date: August Specialty phone number: [phone]  Additional appointment(s) needed:   Additonal Notes: Resched August 29th

## 2024-08-14 ASSESSMENT — LIFESTYLE VARIABLES: TOBACCO_USE: 1

## 2024-08-14 ASSESSMENT — ENCOUNTER SYMPTOMS: SEIZURES: 0

## 2024-08-14 NOTE — ANESTHESIA PREPROCEDURE EVALUATION
"Anesthesia Pre-Procedure Evaluation    Patient: Paula Back   MRN: [id number] : 1990        Procedure : Procedure(s):  VENTRICULOSTOMY, CEREBRAL VENTRICLE, USING OPTICAL TRACKING SYSTEM          Past Medical History:   Diagnosis Date    ADHD (attention deficit hyperactivity disorder)     Chiari malformation type I (H)     Depression     NOEL (generalized anxiety disorder)     Insomnia     Papilledema     Pseudotumor cerebri syndrome     TMJ (temporomandibular joint syndrome)       Past Surgical History:   Procedure Laterality Date    IR LUMBAR PUNCTURE  2023    IR LUMBAR PUNCTURE  2023    LASIK      Resection of elongated styloid      TONSILLECTOMY      TUBAL LIGATION        Allergies   Allergen Reactions    Fentanyl Nausea and Vomiting, Nausea and Other (See Comments)     Patient reports this does not help with pain and only gives \"high\" feeling    Does not help with pain, only makes pt feel \"high\"      Social History     Tobacco Use    Smoking status: Former     Current packs/day: 0.25     Types: Cigarettes     Passive exposure: Past    Smokeless tobacco: Never    Tobacco comments:     she is not exposed to tobacco at home   Substance Use Topics    Alcohol use: Not Currently      Wt Readings from Last 1 Encounters:   24 104.3 kg (230 lb)        Anesthesia Evaluation   Pt has had prior anesthetic. Type: General and MAC (Patient is asking to avoid Fentanyl-on allergy list).    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               Irregular Heartbeat/Palpitations,       Previous cardiac testing   Echo: Date: Results:    Stress Test:  Date: Results:    ECG Reviewed:  Date: 2022 Results:  SR  Cath:  Date: Results:   (-) taking anticoagulants/antiplatelets   METS/Exercise Tolerance: 4 - Raking leaves, gardening    Hematologic:    (-) history of blood clots and history of blood transfusion   Musculoskeletal:       GI/Hepatic:    (-) GERD   Renal/Genitourinary:  - neg Renal ROS     Endo: Comment: Saxenda for weight loss management    (+)               Obesity,       Psychiatric/Substance Use: Comment: Tramadol daily  Vaping THC and edibles    (+) psychiatric history anxiety and depression       Infectious Disease:  - neg infectious disease ROS     Malignancy:  - neg malignancy ROS     Other:  - neg other ROS    (+)  , H/O Chronic Pain,       Per preop virtual visit:  ANESTHESIA CONCERN: Patient requests that she NOT receive Fentanyl. It is on her allergy list. She states that she does not do well with this, it does not help with pain, but makes her feel \"high\". Final counseling and decisions by Anesthesia on DOS   Physical Exam    Airway        Mallampati: III   TM distance: > 3 FB   Neck ROM: full   Mouth opening: > 3 cm    Respiratory Devices and Support         Dental       (+) Minor Abnormalities - some fillings, tiny chips      Cardiovascular          Rhythm and rate: regular and normal     Pulmonary                   OUTSIDE LABS:  CBC:   Lab Results   Component Value Date    WBC 7.0 02/24/2023    WBC 8.2 09/29/2022    HGB 12.1 02/24/2023    HGB 12.4 09/29/2022    HCT 34.5 (L) 02/24/2023    HCT 37.6 09/29/2022     04/17/2024     02/24/2023     BMP:   Lab Results   Component Value Date     02/24/2023     09/29/2022    POTASSIUM 4.3 02/24/2023    POTASSIUM " 4.7 09/29/2022    CHLORIDE 103 02/24/2023    CHLORIDE 102 09/29/2022    CO2 25 02/24/2023    CO2 24 09/29/2022    BUN 5.3 (L) 02/24/2023    BUN 8.0 09/29/2022    CR 0.71 02/24/2023    CR 0.69 09/29/2022    GLC 94 02/24/2023    GLC 97 09/29/2022     COAGS:   Lab Results   Component Value Date    INR 1.13 04/17/2024     POC:   Lab Results   Component Value Date    HCG  11/06/2009     Negative   This test provides a presumptive diagnosis of pregnancy or non-pregnancy. A   confirmed pregnancy diagnosis should only be made by a physician after all   clinical and laboratory findings have been evaluated.    HCGS Negative 09/29/2022     HEPATIC:   Lab Results   Component Value Date    ALBUMIN 3.9 09/29/2022    PROTTOTAL 6.6 09/29/2022    ALT 14 09/29/2022    AST 20 09/29/2022    ALKPHOS 89 09/29/2022    BILITOTAL 0.3 09/29/2022     OTHER:   Lab Results   Component Value Date    HAM 8.9 02/24/2023    MAG 2.0 09/29/2022    TSH 3.93 09/29/2022       Anesthesia Plan    ASA Status:  2       Anesthesia Type: General.     - Airway: ETT   Induction: Intravenous.   Maintenance: Inhalation.        Consents    Anesthesia Plan(s) and associated risks, benefits, and realistic alternatives discussed. Questions answered and patient/representative(s) expressed understanding.     - Discussed: Risks, Benefits and Alternatives for BOTH SEDATION and the PROCEDURE were discussed     - Discussed with:  Patient      - Extended Intubation/Ventilatory Support Discussed: No.      - Patient is DNR/DNI Status: No     Use of blood products discussed: No .     Postoperative Care    Pain management: IV analgesics, Oral pain medications, Multi-modal analgesia.   PONV prophylaxis: Ondansetron (or other 5HT-3), Dexamethasone or Solumedrol     Comments:               Trav Akins MD    I have reviewed the pertinent notes and labs in the chart from the past 30 days and (re)examined the patient.  Any updates or changes from those notes are reflected in  "this note.              # Severe Obesity: Estimated body mass index is 42.05 kg/m  as calculated from the following:    Height as of 8/1/24: 1.575 m (5' 2.01\").    Weight as of 8/1/24: 104.3 kg (230 lb).      "

## 2024-08-15 ENCOUNTER — HOSPITAL ENCOUNTER (OUTPATIENT)
Dept: CT IMAGING | Facility: CLINIC | Age: 34
Discharge: HOME | End: 2024-08-15
Attending: NURSE PRACTITIONER | Admitting: NEUROLOGICAL SURGERY
Payer: COMMERCIAL

## 2024-08-15 ENCOUNTER — ANESTHESIA (OUTPATIENT)
Dept: SURGERY | Facility: CLINIC | Age: 34
End: 2024-08-15
Payer: COMMERCIAL

## 2024-08-15 ENCOUNTER — APPOINTMENT (OUTPATIENT)
Dept: CT IMAGING | Facility: CLINIC | Age: 34
End: 2024-08-15
Payer: COMMERCIAL

## 2024-08-15 ENCOUNTER — HOSPITAL ENCOUNTER (INPATIENT)
Facility: CLINIC | Age: 34
LOS: 2 days | Discharge: HOME | End: 2024-08-17
Attending: NEUROLOGICAL SURGERY | Admitting: NEUROLOGICAL SURGERY
Payer: COMMERCIAL

## 2024-08-15 DIAGNOSIS — G93.2 PSEUDOTUMOR CEREBRI SYNDROME: ICD-10-CM

## 2024-08-15 DIAGNOSIS — G93.5 CHIARI I MALFORMATION (H): Primary | ICD-10-CM

## 2024-08-15 DIAGNOSIS — G93.5 CHIARI MALFORMATION TYPE I (H): ICD-10-CM

## 2024-08-15 LAB
ABO/RH(D): NORMAL
ALBUMIN SERPL BCG-MCNC: 4 G/DL (ref 3.5–5.2)
ALBUMIN SERPL BCG-MCNC: 4 G/DL (ref 3.5–5.2)
ALP SERPL-CCNC: 78 U/L (ref 40–150)
ALP SERPL-CCNC: 78 U/L (ref 40–150)
ALT SERPL W P-5'-P-CCNC: 8 U/L (ref 0–50)
ALT SERPL W P-5'-P-CCNC: 8 U/L (ref 0–50)
ANION GAP SERPL CALCULATED.3IONS-SCNC: 11 MMOL/L (ref 7–15)
ANTIBODY SCREEN: NEGATIVE
APTT PPP: 36 SECONDS (ref 22–38)
AST SERPL W P-5'-P-CCNC: 16 U/L (ref 0–45)
AST SERPL W P-5'-P-CCNC: 16 U/L (ref 0–45)
BASOPHILS # BLD AUTO: 0.1 10E3/UL (ref 0–0.2)
BASOPHILS NFR BLD AUTO: 1 %
BILIRUB DIRECT SERPL-MCNC: <0.2 MG/DL (ref 0–0.3)
BILIRUB SERPL-MCNC: 0.3 MG/DL
BILIRUB SERPL-MCNC: 0.3 MG/DL
BLD PROD TYP BPU: NORMAL
BLOOD COMPONENT TYPE: NORMAL
BUN SERPL-MCNC: 9.6 MG/DL (ref 6–20)
CALCIUM SERPL-MCNC: 8.9 MG/DL (ref 8.8–10.4)
CHLORIDE SERPL-SCNC: 110 MMOL/L (ref 98–107)
CODING SYSTEM: NORMAL
CREAT SERPL-MCNC: 0.86 MG/DL (ref 0.51–0.95)
CROSSMATCH: NORMAL
EGFRCR SERPLBLD CKD-EPI 2021: >90 ML/MIN/1.73M2
EOSINOPHIL # BLD AUTO: 0.2 10E3/UL (ref 0–0.7)
EOSINOPHIL NFR BLD AUTO: 4 %
ERYTHROCYTE [DISTWIDTH] IN BLOOD BY AUTOMATED COUNT: 12.9 % (ref 10–15)
GLUCOSE BLDC GLUCOMTR-MCNC: 128 MG/DL (ref 70–99)
GLUCOSE BLDC GLUCOMTR-MCNC: 98 MG/DL (ref 70–99)
GLUCOSE SERPL-MCNC: 104 MG/DL (ref 70–99)
HCG INTACT+B SERPL-ACNC: <1 MIU/ML
HCO3 SERPL-SCNC: 21 MMOL/L (ref 22–29)
HCT VFR BLD AUTO: 36.8 % (ref 35–47)
HGB BLD-MCNC: 12.2 G/DL (ref 11.7–15.7)
IMM GRANULOCYTES # BLD: 0 10E3/UL
IMM GRANULOCYTES NFR BLD: 0 %
INR PPP: 1.05 (ref 0.85–1.15)
LYMPHOCYTES # BLD AUTO: 1.6 10E3/UL (ref 0.8–5.3)
LYMPHOCYTES NFR BLD AUTO: 38 %
MCH RBC QN AUTO: 29.9 PG (ref 26.5–33)
MCHC RBC AUTO-ENTMCNC: 33.2 G/DL (ref 31.5–36.5)
MCV RBC AUTO: 90 FL (ref 78–100)
MONOCYTES # BLD AUTO: 0.4 10E3/UL (ref 0–1.3)
MONOCYTES NFR BLD AUTO: 9 %
NEUTROPHILS # BLD AUTO: 2 10E3/UL (ref 1.6–8.3)
NEUTROPHILS NFR BLD AUTO: 48 %
NRBC # BLD AUTO: 0 10E3/UL
NRBC BLD AUTO-RTO: 0 /100
PLATELET # BLD AUTO: 308 10E3/UL (ref 150–450)
POTASSIUM SERPL-SCNC: 4.1 MMOL/L (ref 3.4–5.3)
PROT SERPL-MCNC: 6.6 G/DL (ref 6.4–8.3)
PROT SERPL-MCNC: 6.6 G/DL (ref 6.4–8.3)
RBC # BLD AUTO: 4.08 10E6/UL (ref 3.8–5.2)
SODIUM SERPL-SCNC: 142 MMOL/L (ref 135–145)
SPECIMEN EXPIRATION DATE: NORMAL
UNIT ABO/RH: NORMAL
UNIT NUMBER: NORMAL
UNIT STATUS: NORMAL
UNIT TYPE ISBT: 7300
WBC # BLD AUTO: 4.3 10E3/UL (ref 4–11)

## 2024-08-15 PROCEDURE — 710N000011 HC RECOVERY PHASE 1, LEVEL 3, PER MIN: Performed by: NEUROLOGICAL SURGERY

## 2024-08-15 PROCEDURE — 8E09XBG COMPUTER ASSISTED PROCEDURE OF HEAD AND NECK REGION, WITH COMPUTERIZED TOMOGRAPHY: ICD-10-PCS | Performed by: NEUROLOGICAL SURGERY

## 2024-08-15 PROCEDURE — 85025 COMPLETE CBC W/AUTO DIFF WBC: CPT | Performed by: NURSE PRACTITIONER

## 2024-08-15 PROCEDURE — 86900 BLOOD TYPING SEROLOGIC ABO: CPT | Performed by: NURSE PRACTITIONER

## 2024-08-15 PROCEDURE — 250N000025 HC SEVOFLURANE, PER MIN: Performed by: NEUROLOGICAL SURGERY

## 2024-08-15 PROCEDURE — 85610 PROTHROMBIN TIME: CPT | Performed by: NURSE PRACTITIONER

## 2024-08-15 PROCEDURE — 85730 THROMBOPLASTIN TIME PARTIAL: CPT | Performed by: NURSE PRACTITIONER

## 2024-08-15 PROCEDURE — 70450 CT HEAD/BRAIN W/O DYE: CPT

## 2024-08-15 PROCEDURE — 250N000011 HC RX IP 250 OP 636

## 2024-08-15 PROCEDURE — 93010 ELECTROCARDIOGRAM REPORT: CPT | Performed by: INTERNAL MEDICINE

## 2024-08-15 PROCEDURE — 61210 BURR HOLE IMPLT VENTR CATH: CPT | Mod: GC | Performed by: NEUROLOGICAL SURGERY

## 2024-08-15 PROCEDURE — 62180 ESTABLISH BRAIN CAVITY SHUNT: CPT | Performed by: STUDENT IN AN ORGANIZED HEALTH CARE EDUCATION/TRAINING PROGRAM

## 2024-08-15 PROCEDURE — 360N000079 HC SURGERY LEVEL 6, PER MIN: Performed by: NEUROLOGICAL SURGERY

## 2024-08-15 PROCEDURE — 258N000003 HC RX IP 258 OP 636

## 2024-08-15 PROCEDURE — 250N000009 HC RX 250

## 2024-08-15 PROCEDURE — 70450 CT HEAD/BRAIN W/O DYE: CPT | Mod: 77

## 2024-08-15 PROCEDURE — 82247 BILIRUBIN TOTAL: CPT | Performed by: NURSE PRACTITIONER

## 2024-08-15 PROCEDURE — 200N000002 HC R&B ICU UMMC

## 2024-08-15 PROCEDURE — 86923 COMPATIBILITY TEST ELECTRIC: CPT

## 2024-08-15 PROCEDURE — 009630Z DRAINAGE OF CEREBRAL VENTRICLE WITH DRAINAGE DEVICE, PERCUTANEOUS APPROACH: ICD-10-PCS | Performed by: NEUROLOGICAL SURGERY

## 2024-08-15 PROCEDURE — 370N000017 HC ANESTHESIA TECHNICAL FEE, PER MIN: Performed by: NEUROLOGICAL SURGERY

## 2024-08-15 PROCEDURE — 84702 CHORIONIC GONADOTROPIN TEST: CPT | Performed by: NURSE PRACTITIONER

## 2024-08-15 PROCEDURE — 250N000011 HC RX IP 250 OP 636: Performed by: NURSE PRACTITIONER

## 2024-08-15 PROCEDURE — 250N000011 HC RX IP 250 OP 636: Performed by: STUDENT IN AN ORGANIZED HEALTH CARE EDUCATION/TRAINING PROGRAM

## 2024-08-15 PROCEDURE — 272N000001 HC OR GENERAL SUPPLY STERILE: Performed by: NEUROLOGICAL SURGERY

## 2024-08-15 PROCEDURE — 250N000013 HC RX MED GY IP 250 OP 250 PS 637

## 2024-08-15 PROCEDURE — 70450 CT HEAD/BRAIN W/O DYE: CPT | Mod: 26 | Performed by: RADIOLOGY

## 2024-08-15 PROCEDURE — 93005 ELECTROCARDIOGRAM TRACING: CPT

## 2024-08-15 PROCEDURE — 250N000009 HC RX 250: Performed by: NEUROLOGICAL SURGERY

## 2024-08-15 PROCEDURE — 999N000141 HC STATISTIC PRE-PROCEDURE NURSING ASSESSMENT: Performed by: NEUROLOGICAL SURGERY

## 2024-08-15 PROCEDURE — 250N000013 HC RX MED GY IP 250 OP 250 PS 637: Performed by: NURSE PRACTITIONER

## 2024-08-15 PROCEDURE — 36415 COLL VENOUS BLD VENIPUNCTURE: CPT | Performed by: NURSE PRACTITIONER

## 2024-08-15 PROCEDURE — 61781 SCAN PROC CRANIAL INTRA: CPT | Mod: GC | Performed by: NEUROLOGICAL SURGERY

## 2024-08-15 RX ORDER — OXYCODONE HYDROCHLORIDE 10 MG/1
10 TABLET ORAL EVERY 4 HOURS PRN
Status: DISCONTINUED | OUTPATIENT
Start: 2024-08-15 | End: 2024-08-15

## 2024-08-15 RX ORDER — POLYETHYLENE GLYCOL 3350 17 G/17G
17 POWDER, FOR SOLUTION ORAL DAILY
Status: DISCONTINUED | OUTPATIENT
Start: 2024-08-16 | End: 2024-08-17 | Stop reason: HOSPADM

## 2024-08-15 RX ORDER — IBUPROFEN 200 MG
200 TABLET ORAL EVERY 6 HOURS PRN
Status: DISCONTINUED | OUTPATIENT
Start: 2024-08-15 | End: 2024-08-16

## 2024-08-15 RX ORDER — SODIUM CHLORIDE, SODIUM LACTATE, POTASSIUM CHLORIDE, CALCIUM CHLORIDE 600; 310; 30; 20 MG/100ML; MG/100ML; MG/100ML; MG/100ML
INJECTION, SOLUTION INTRAVENOUS CONTINUOUS PRN
Status: DISCONTINUED | OUTPATIENT
Start: 2024-08-15 | End: 2024-08-15

## 2024-08-15 RX ORDER — CEFAZOLIN SODIUM/WATER 2 G/20 ML
2 SYRINGE (ML) INTRAVENOUS
Status: COMPLETED | OUTPATIENT
Start: 2024-08-15 | End: 2024-08-15

## 2024-08-15 RX ORDER — CEFAZOLIN SODIUM 2 G/100ML
2 INJECTION, SOLUTION INTRAVENOUS EVERY 8 HOURS
Status: DISCONTINUED | OUTPATIENT
Start: 2024-08-16 | End: 2024-08-15

## 2024-08-15 RX ORDER — SODIUM CHLORIDE, SODIUM LACTATE, POTASSIUM CHLORIDE, CALCIUM CHLORIDE 600; 310; 30; 20 MG/100ML; MG/100ML; MG/100ML; MG/100ML
INJECTION, SOLUTION INTRAVENOUS CONTINUOUS
Status: DISCONTINUED | OUTPATIENT
Start: 2024-08-15 | End: 2024-08-15

## 2024-08-15 RX ORDER — DIAZEPAM 10 MG/2ML
5 INJECTION, SOLUTION INTRAMUSCULAR; INTRAVENOUS EVERY 8 HOURS PRN
Status: DISCONTINUED | OUTPATIENT
Start: 2024-08-15 | End: 2024-08-17 | Stop reason: HOSPADM

## 2024-08-15 RX ORDER — DEXTROAMPHETAMINE SACCHARATE, AMPHETAMINE ASPARTATE, DEXTROAMPHETAMINE SULFATE AND AMPHETAMINE SULFATE 1.25; 1.25; 1.25; 1.25 MG/1; MG/1; MG/1; MG/1
5 TABLET ORAL DAILY PRN
COMMUNITY

## 2024-08-15 RX ORDER — DEXTROAMPHETAMINE SACCHARATE, AMPHETAMINE ASPARTATE, DEXTROAMPHETAMINE SULFATE AND AMPHETAMINE SULFATE 1.25; 1.25; 1.25; 1.25 MG/1; MG/1; MG/1; MG/1
5 TABLET ORAL DAILY PRN
Status: DISCONTINUED | OUTPATIENT
Start: 2024-08-16 | End: 2024-08-17 | Stop reason: HOSPADM

## 2024-08-15 RX ORDER — CEFAZOLIN SODIUM/WATER 2 G/20 ML
2 SYRINGE (ML) INTRAVENOUS
Status: DISCONTINUED | OUTPATIENT
Start: 2024-08-15 | End: 2024-08-15

## 2024-08-15 RX ORDER — NALOXONE HYDROCHLORIDE 0.4 MG/ML
0.2 INJECTION, SOLUTION INTRAMUSCULAR; INTRAVENOUS; SUBCUTANEOUS
Status: DISCONTINUED | OUTPATIENT
Start: 2024-08-15 | End: 2024-08-15

## 2024-08-15 RX ORDER — ACETAMINOPHEN 325 MG/1
975 TABLET ORAL EVERY 8 HOURS
Status: DISCONTINUED | OUTPATIENT
Start: 2024-08-15 | End: 2024-08-15

## 2024-08-15 RX ORDER — PROCHLORPERAZINE MALEATE 5 MG
10 TABLET ORAL EVERY 6 HOURS PRN
Status: DISCONTINUED | OUTPATIENT
Start: 2024-08-15 | End: 2024-08-16

## 2024-08-15 RX ORDER — OXYCODONE HYDROCHLORIDE 5 MG/1
5 TABLET ORAL EVERY 4 HOURS PRN
Status: DISCONTINUED | OUTPATIENT
Start: 2024-08-15 | End: 2024-08-15

## 2024-08-15 RX ORDER — CLINDAMYCIN PHOSPHATE 10 MG/G
GEL TOPICAL 2 TIMES DAILY
Status: DISCONTINUED | OUTPATIENT
Start: 2024-08-15 | End: 2024-08-15

## 2024-08-15 RX ORDER — TOPIRAMATE 25 MG/1
25 TABLET, FILM COATED ORAL AT BEDTIME
Status: DISCONTINUED | OUTPATIENT
Start: 2024-08-15 | End: 2024-08-16

## 2024-08-15 RX ORDER — LORAZEPAM 0.5 MG/1
0.5 TABLET ORAL DAILY PRN
Status: DISCONTINUED | OUTPATIENT
Start: 2024-08-15 | End: 2024-08-17 | Stop reason: HOSPADM

## 2024-08-15 RX ORDER — NALOXONE HYDROCHLORIDE 0.4 MG/ML
0.2 INJECTION, SOLUTION INTRAMUSCULAR; INTRAVENOUS; SUBCUTANEOUS
Status: DISCONTINUED | OUTPATIENT
Start: 2024-08-15 | End: 2024-08-17 | Stop reason: HOSPADM

## 2024-08-15 RX ORDER — AMOXICILLIN 250 MG
1 CAPSULE ORAL AT BEDTIME
Status: DISCONTINUED | OUTPATIENT
Start: 2024-08-15 | End: 2024-08-15

## 2024-08-15 RX ORDER — NALOXONE HYDROCHLORIDE 0.4 MG/ML
0.4 INJECTION, SOLUTION INTRAMUSCULAR; INTRAVENOUS; SUBCUTANEOUS
Status: DISCONTINUED | OUTPATIENT
Start: 2024-08-15 | End: 2024-08-15

## 2024-08-15 RX ORDER — ACETAMINOPHEN 325 MG/1
650 TABLET ORAL EVERY 4 HOURS PRN
Status: DISCONTINUED | OUTPATIENT
Start: 2024-08-18 | End: 2024-08-15

## 2024-08-15 RX ORDER — DEXTROAMPHETAMINE SACCHARATE, AMPHETAMINE ASPARTATE MONOHYDRATE, DEXTROAMPHETAMINE SULFATE AND AMPHETAMINE SULFATE 2.5; 2.5; 2.5; 2.5 MG/1; MG/1; MG/1; MG/1
30 CAPSULE, EXTENDED RELEASE ORAL EVERY MORNING
Status: DISCONTINUED | OUTPATIENT
Start: 2024-08-16 | End: 2024-08-17 | Stop reason: HOSPADM

## 2024-08-15 RX ORDER — ONDANSETRON 4 MG/1
4 TABLET, ORALLY DISINTEGRATING ORAL EVERY 30 MIN PRN
Status: DISCONTINUED | OUTPATIENT
Start: 2024-08-15 | End: 2024-08-15

## 2024-08-15 RX ORDER — LIRAGLUTIDE 6 MG/ML
1.8 INJECTION SUBCUTANEOUS DAILY
Status: DISCONTINUED | OUTPATIENT
Start: 2024-08-15 | End: 2024-08-15

## 2024-08-15 RX ORDER — ONDANSETRON 2 MG/ML
4 INJECTION INTRAMUSCULAR; INTRAVENOUS EVERY 6 HOURS PRN
Status: DISCONTINUED | OUTPATIENT
Start: 2024-08-15 | End: 2024-08-15

## 2024-08-15 RX ORDER — LIRAGLUTIDE 6 MG/ML
1.2 INJECTION SUBCUTANEOUS DAILY
COMMUNITY

## 2024-08-15 RX ORDER — ONDANSETRON 4 MG/1
4 TABLET, ORALLY DISINTEGRATING ORAL EVERY 6 HOURS PRN
Status: DISCONTINUED | OUTPATIENT
Start: 2024-08-15 | End: 2024-08-17 | Stop reason: HOSPADM

## 2024-08-15 RX ORDER — BISACODYL 10 MG
10 SUPPOSITORY, RECTAL RECTAL DAILY PRN
Status: DISCONTINUED | OUTPATIENT
Start: 2024-08-18 | End: 2024-08-17 | Stop reason: HOSPADM

## 2024-08-15 RX ORDER — LIDOCAINE 40 MG/G
CREAM TOPICAL
Status: DISCONTINUED | OUTPATIENT
Start: 2024-08-15 | End: 2024-08-17 | Stop reason: HOSPADM

## 2024-08-15 RX ORDER — HYDROMORPHONE HCL IN WATER/PF 6 MG/30 ML
0.2 PATIENT CONTROLLED ANALGESIA SYRINGE INTRAVENOUS
Status: DISCONTINUED | OUTPATIENT
Start: 2024-08-15 | End: 2024-08-17 | Stop reason: HOSPADM

## 2024-08-15 RX ORDER — ACETAMINOPHEN 325 MG/1
650 TABLET ORAL EVERY 4 HOURS PRN
Status: DISCONTINUED | OUTPATIENT
Start: 2024-08-18 | End: 2024-08-17 | Stop reason: HOSPADM

## 2024-08-15 RX ORDER — ONDANSETRON 2 MG/ML
4 INJECTION INTRAMUSCULAR; INTRAVENOUS EVERY 6 HOURS PRN
Status: DISCONTINUED | OUTPATIENT
Start: 2024-08-15 | End: 2024-08-17 | Stop reason: HOSPADM

## 2024-08-15 RX ORDER — NALOXONE HYDROCHLORIDE 0.4 MG/ML
0.4 INJECTION, SOLUTION INTRAMUSCULAR; INTRAVENOUS; SUBCUTANEOUS
Status: DISCONTINUED | OUTPATIENT
Start: 2024-08-15 | End: 2024-08-17 | Stop reason: HOSPADM

## 2024-08-15 RX ORDER — POLYETHYLENE GLYCOL 3350 17 G/17G
17 POWDER, FOR SOLUTION ORAL DAILY
Status: DISCONTINUED | OUTPATIENT
Start: 2024-08-16 | End: 2024-08-15

## 2024-08-15 RX ORDER — FLUMAZENIL 0.1 MG/ML
0.2 INJECTION, SOLUTION INTRAVENOUS
Status: DISCONTINUED | OUTPATIENT
Start: 2024-08-15 | End: 2024-08-17 | Stop reason: HOSPADM

## 2024-08-15 RX ORDER — PROPRANOLOL HYDROCHLORIDE 10 MG/1
10 TABLET ORAL EVERY MORNING
Status: DISCONTINUED | OUTPATIENT
Start: 2024-08-16 | End: 2024-08-17 | Stop reason: HOSPADM

## 2024-08-15 RX ORDER — CEFAZOLIN SODIUM 1 G/3ML
1 INJECTION, POWDER, FOR SOLUTION INTRAMUSCULAR; INTRAVENOUS EVERY 8 HOURS
Status: DISCONTINUED | OUTPATIENT
Start: 2024-08-15 | End: 2024-08-15

## 2024-08-15 RX ORDER — DEXAMETHASONE SODIUM PHOSPHATE 4 MG/ML
INJECTION, SOLUTION INTRA-ARTICULAR; INTRALESIONAL; INTRAMUSCULAR; INTRAVENOUS; SOFT TISSUE PRN
Status: DISCONTINUED | OUTPATIENT
Start: 2024-08-15 | End: 2024-08-15

## 2024-08-15 RX ORDER — HYDROMORPHONE HCL IN WATER/PF 6 MG/30 ML
0.2 PATIENT CONTROLLED ANALGESIA SYRINGE INTRAVENOUS
Status: DISCONTINUED | OUTPATIENT
Start: 2024-08-15 | End: 2024-08-15

## 2024-08-15 RX ORDER — METHOCARBAMOL 500 MG/1
500 TABLET, FILM COATED ORAL 3 TIMES DAILY
Status: DISCONTINUED | OUTPATIENT
Start: 2024-08-15 | End: 2024-08-15

## 2024-08-15 RX ORDER — OXYCODONE HYDROCHLORIDE 5 MG/1
5 TABLET ORAL EVERY 4 HOURS PRN
Status: DISCONTINUED | OUTPATIENT
Start: 2024-08-15 | End: 2024-08-17 | Stop reason: HOSPADM

## 2024-08-15 RX ORDER — ACETAMINOPHEN 325 MG/1
975 TABLET ORAL EVERY 8 HOURS
Status: DISCONTINUED | OUTPATIENT
Start: 2024-08-15 | End: 2024-08-17 | Stop reason: HOSPADM

## 2024-08-15 RX ORDER — SODIUM CHLORIDE 9 MG/ML
INJECTION, SOLUTION INTRAVENOUS CONTINUOUS
Status: ACTIVE | OUTPATIENT
Start: 2024-08-15 | End: 2024-08-15

## 2024-08-15 RX ORDER — HYDROMORPHONE HCL IN WATER/PF 6 MG/30 ML
0.2 PATIENT CONTROLLED ANALGESIA SYRINGE INTRAVENOUS EVERY 5 MIN PRN
Status: DISCONTINUED | OUTPATIENT
Start: 2024-08-15 | End: 2024-08-15

## 2024-08-15 RX ORDER — ONDANSETRON 4 MG/1
4 TABLET, ORALLY DISINTEGRATING ORAL EVERY 6 HOURS PRN
Status: DISCONTINUED | OUTPATIENT
Start: 2024-08-15 | End: 2024-08-15

## 2024-08-15 RX ORDER — DEXAMETHASONE SODIUM PHOSPHATE 4 MG/ML
4 INJECTION, SOLUTION INTRA-ARTICULAR; INTRALESIONAL; INTRAMUSCULAR; INTRAVENOUS; SOFT TISSUE
Status: DISCONTINUED | OUTPATIENT
Start: 2024-08-15 | End: 2024-08-15

## 2024-08-15 RX ORDER — VILAZODONE HYDROCHLORIDE 20 MG/1
20 TABLET ORAL EVERY MORNING
Status: DISCONTINUED | OUTPATIENT
Start: 2024-08-16 | End: 2024-08-17 | Stop reason: HOSPADM

## 2024-08-15 RX ORDER — HYDROMORPHONE HCL IN WATER/PF 6 MG/30 ML
0.4 PATIENT CONTROLLED ANALGESIA SYRINGE INTRAVENOUS EVERY 5 MIN PRN
Status: DISCONTINUED | OUTPATIENT
Start: 2024-08-15 | End: 2024-08-15

## 2024-08-15 RX ORDER — ONDANSETRON 2 MG/ML
INJECTION INTRAMUSCULAR; INTRAVENOUS PRN
Status: DISCONTINUED | OUTPATIENT
Start: 2024-08-15 | End: 2024-08-15

## 2024-08-15 RX ORDER — CEFAZOLIN SODIUM 2 G/100ML
2 INJECTION, SOLUTION INTRAVENOUS EVERY 8 HOURS
Status: DISCONTINUED | OUTPATIENT
Start: 2024-08-16 | End: 2024-08-17 | Stop reason: HOSPADM

## 2024-08-15 RX ORDER — DULOXETIN HYDROCHLORIDE 30 MG/1
30 CAPSULE, DELAYED RELEASE ORAL EVERY EVENING
Status: DISCONTINUED | OUTPATIENT
Start: 2024-08-15 | End: 2024-08-17 | Stop reason: HOSPADM

## 2024-08-15 RX ORDER — PROPOFOL 10 MG/ML
INJECTION, EMULSION INTRAVENOUS PRN
Status: DISCONTINUED | OUTPATIENT
Start: 2024-08-15 | End: 2024-08-15

## 2024-08-15 RX ORDER — TRAZODONE HYDROCHLORIDE 50 MG/1
50 TABLET, FILM COATED ORAL AT BEDTIME
Status: DISCONTINUED | OUTPATIENT
Start: 2024-08-15 | End: 2024-08-17 | Stop reason: HOSPADM

## 2024-08-15 RX ORDER — ACETAMINOPHEN 325 MG/1
975 TABLET ORAL ONCE
Status: COMPLETED | OUTPATIENT
Start: 2024-08-15 | End: 2024-08-15

## 2024-08-15 RX ORDER — ONDANSETRON 8 MG/1
8 TABLET, FILM COATED ORAL EVERY 8 HOURS PRN
COMMUNITY

## 2024-08-15 RX ORDER — CEFAZOLIN SODIUM/WATER 2 G/20 ML
2 SYRINGE (ML) INTRAVENOUS SEE ADMIN INSTRUCTIONS
Status: DISCONTINUED | OUTPATIENT
Start: 2024-08-15 | End: 2024-08-15 | Stop reason: HOSPADM

## 2024-08-15 RX ORDER — ONDANSETRON 2 MG/ML
4 INJECTION INTRAMUSCULAR; INTRAVENOUS EVERY 30 MIN PRN
Status: DISCONTINUED | OUTPATIENT
Start: 2024-08-15 | End: 2024-08-15

## 2024-08-15 RX ORDER — HYDROMORPHONE HCL IN WATER/PF 6 MG/30 ML
0.4 PATIENT CONTROLLED ANALGESIA SYRINGE INTRAVENOUS
Status: DISCONTINUED | OUTPATIENT
Start: 2024-08-15 | End: 2024-08-17 | Stop reason: HOSPADM

## 2024-08-15 RX ORDER — LIDOCAINE HYDROCHLORIDE 20 MG/ML
INJECTION, SOLUTION INFILTRATION; PERINEURAL PRN
Status: DISCONTINUED | OUTPATIENT
Start: 2024-08-15 | End: 2024-08-15

## 2024-08-15 RX ORDER — BUPIVACAINE HYDROCHLORIDE AND EPINEPHRINE 2.5; 5 MG/ML; UG/ML
INJECTION, SOLUTION EPIDURAL; INFILTRATION; INTRACAUDAL; PERINEURAL PRN
Status: DISCONTINUED | OUTPATIENT
Start: 2024-08-15 | End: 2024-08-15 | Stop reason: HOSPADM

## 2024-08-15 RX ORDER — METHOCARBAMOL 500 MG/1
500 TABLET, FILM COATED ORAL 3 TIMES DAILY
Status: DISCONTINUED | OUTPATIENT
Start: 2024-08-15 | End: 2024-08-17 | Stop reason: HOSPADM

## 2024-08-15 RX ORDER — TRAMADOL HYDROCHLORIDE 50 MG/1
50 TABLET ORAL EVERY 6 HOURS PRN
Status: DISCONTINUED | OUTPATIENT
Start: 2024-08-15 | End: 2024-08-17 | Stop reason: HOSPADM

## 2024-08-15 RX ORDER — AMOXICILLIN 250 MG
1 CAPSULE ORAL 2 TIMES DAILY
Status: DISCONTINUED | OUTPATIENT
Start: 2024-08-15 | End: 2024-08-17 | Stop reason: HOSPADM

## 2024-08-15 RX ORDER — OXYCODONE HYDROCHLORIDE 10 MG/1
10 TABLET ORAL EVERY 4 HOURS PRN
Status: DISCONTINUED | OUTPATIENT
Start: 2024-08-15 | End: 2024-08-17 | Stop reason: HOSPADM

## 2024-08-15 RX ORDER — HYDROMORPHONE HCL IN WATER/PF 6 MG/30 ML
0.4 PATIENT CONTROLLED ANALGESIA SYRINGE INTRAVENOUS
Status: DISCONTINUED | OUTPATIENT
Start: 2024-08-15 | End: 2024-08-15

## 2024-08-15 RX ORDER — NALOXONE HYDROCHLORIDE 0.4 MG/ML
0.1 INJECTION, SOLUTION INTRAMUSCULAR; INTRAVENOUS; SUBCUTANEOUS
Status: DISCONTINUED | OUTPATIENT
Start: 2024-08-15 | End: 2024-08-15

## 2024-08-15 RX ADMIN — OXYCODONE HYDROCHLORIDE 10 MG: 10 TABLET ORAL at 14:41

## 2024-08-15 RX ADMIN — DEXMEDETOMIDINE HYDROCHLORIDE 4 MCG: 100 INJECTION, SOLUTION INTRAVENOUS at 09:48

## 2024-08-15 RX ADMIN — HYDROMORPHONE HYDROCHLORIDE 0.4 MG: 0.2 INJECTION, SOLUTION INTRAMUSCULAR; INTRAVENOUS; SUBCUTANEOUS at 20:18

## 2024-08-15 RX ADMIN — METHOCARBAMOL 500 MG: 500 TABLET ORAL at 17:37

## 2024-08-15 RX ADMIN — HYDROMORPHONE HYDROCHLORIDE 0.5 MG: 1 INJECTION, SOLUTION INTRAMUSCULAR; INTRAVENOUS; SUBCUTANEOUS at 08:50

## 2024-08-15 RX ADMIN — PHENYLEPHRINE HYDROCHLORIDE 100 MCG: 10 INJECTION INTRAVENOUS at 08:50

## 2024-08-15 RX ADMIN — HYDROMORPHONE HYDROCHLORIDE 0.2 MG: 0.2 INJECTION, SOLUTION INTRAMUSCULAR; INTRAVENOUS; SUBCUTANEOUS at 12:18

## 2024-08-15 RX ADMIN — SODIUM CHLORIDE, POTASSIUM CHLORIDE, SODIUM LACTATE AND CALCIUM CHLORIDE: 600; 310; 30; 20 INJECTION, SOLUTION INTRAVENOUS at 07:43

## 2024-08-15 RX ADMIN — Medication 50 MG: at 07:59

## 2024-08-15 RX ADMIN — HYDROMORPHONE HYDROCHLORIDE 0.2 MG: 0.2 INJECTION, SOLUTION INTRAMUSCULAR; INTRAVENOUS; SUBCUTANEOUS at 12:04

## 2024-08-15 RX ADMIN — DEXMEDETOMIDINE HYDROCHLORIDE 8 MCG: 100 INJECTION, SOLUTION INTRAVENOUS at 07:50

## 2024-08-15 RX ADMIN — HYDROMORPHONE HYDROCHLORIDE 0.4 MG: 0.2 INJECTION, SOLUTION INTRAMUSCULAR; INTRAVENOUS; SUBCUTANEOUS at 10:17

## 2024-08-15 RX ADMIN — DIAZEPAM 5 MG: 5 INJECTION, SOLUTION INTRAMUSCULAR; INTRAVENOUS at 17:34

## 2024-08-15 RX ADMIN — PHENYLEPHRINE HYDROCHLORIDE 100 MCG: 10 INJECTION INTRAVENOUS at 08:16

## 2024-08-15 RX ADMIN — ACETAMINOPHEN 975 MG: 325 TABLET ORAL at 14:19

## 2024-08-15 RX ADMIN — TRAZODONE HYDROCHLORIDE 50 MG: 50 TABLET ORAL at 22:08

## 2024-08-15 RX ADMIN — LIDOCAINE HYDROCHLORIDE 100 MG: 20 INJECTION, SOLUTION INFILTRATION; PERINEURAL at 07:59

## 2024-08-15 RX ADMIN — HYDROMORPHONE HYDROCHLORIDE 0.4 MG: 0.2 INJECTION, SOLUTION INTRAMUSCULAR; INTRAVENOUS; SUBCUTANEOUS at 11:45

## 2024-08-15 RX ADMIN — HYDROMORPHONE HYDROCHLORIDE 0.2 MG: 0.2 INJECTION, SOLUTION INTRAMUSCULAR; INTRAVENOUS; SUBCUTANEOUS at 11:19

## 2024-08-15 RX ADMIN — PHENYLEPHRINE HYDROCHLORIDE 50 MCG: 10 INJECTION INTRAVENOUS at 09:23

## 2024-08-15 RX ADMIN — SODIUM CHLORIDE: 9 INJECTION, SOLUTION INTRAVENOUS at 11:30

## 2024-08-15 RX ADMIN — HYDROMORPHONE HYDROCHLORIDE 0.4 MG: 0.2 INJECTION, SOLUTION INTRAMUSCULAR; INTRAVENOUS; SUBCUTANEOUS at 10:46

## 2024-08-15 RX ADMIN — PROPOFOL 150 MG: 10 INJECTION, EMULSION INTRAVENOUS at 07:59

## 2024-08-15 RX ADMIN — HYDROMORPHONE HYDROCHLORIDE 0.4 MG: 0.2 INJECTION, SOLUTION INTRAMUSCULAR; INTRAVENOUS; SUBCUTANEOUS at 16:08

## 2024-08-15 RX ADMIN — DEXAMETHASONE SODIUM PHOSPHATE 4 MG: 4 INJECTION, SOLUTION INTRA-ARTICULAR; INTRALESIONAL; INTRAMUSCULAR; INTRAVENOUS; SOFT TISSUE at 08:18

## 2024-08-15 RX ADMIN — DEXMEDETOMIDINE HYDROCHLORIDE 8 MCG: 100 INJECTION, SOLUTION INTRAVENOUS at 08:43

## 2024-08-15 RX ADMIN — HYDROMORPHONE HYDROCHLORIDE 0.4 MG: 0.2 INJECTION, SOLUTION INTRAMUSCULAR; INTRAVENOUS; SUBCUTANEOUS at 14:02

## 2024-08-15 RX ADMIN — DULOXETINE HYDROCHLORIDE 30 MG: 30 CAPSULE, DELAYED RELEASE ORAL at 20:08

## 2024-08-15 RX ADMIN — ONDANSETRON 4 MG: 2 INJECTION INTRAMUSCULAR; INTRAVENOUS at 09:48

## 2024-08-15 RX ADMIN — ACETAMINOPHEN 975 MG: 325 TABLET ORAL at 22:07

## 2024-08-15 RX ADMIN — Medication 2 G: at 08:09

## 2024-08-15 RX ADMIN — MIDAZOLAM 2 MG: 1 INJECTION INTRAMUSCULAR; INTRAVENOUS at 07:40

## 2024-08-15 RX ADMIN — SUGAMMADEX 200 MG: 100 INJECTION, SOLUTION INTRAVENOUS at 09:48

## 2024-08-15 RX ADMIN — CEFAZOLIN 1 G: 1 INJECTION, POWDER, FOR SOLUTION INTRAMUSCULAR; INTRAVENOUS at 16:52

## 2024-08-15 RX ADMIN — ACETAMINOPHEN 975 MG: 325 TABLET ORAL at 07:11

## 2024-08-15 RX ADMIN — OXYCODONE HYDROCHLORIDE 10 MG: 10 TABLET ORAL at 22:14

## 2024-08-15 RX ADMIN — Medication 10 MG: at 09:01

## 2024-08-15 ASSESSMENT — VISUAL ACUITY
OU: OTHER (SEE COMMENT)

## 2024-08-15 ASSESSMENT — ACTIVITIES OF DAILY LIVING (ADL)
ADLS_ACUITY_SCORE: 18
ADLS_ACUITY_SCORE: 22
ADLS_ACUITY_SCORE: 18
ADLS_ACUITY_SCORE: 18
ADLS_ACUITY_SCORE: 16
ADLS_ACUITY_SCORE: 18
ADLS_ACUITY_SCORE: 22
ADLS_ACUITY_SCORE: 18
ADLS_ACUITY_SCORE: 22
ADLS_ACUITY_SCORE: 18

## 2024-08-15 NOTE — PRE-PROCEDURE
Brief Neurosurgery Preoperative Exam Note    Patient evaluated in the preoperative area.   Paula Back is a 34 year old female with a history of chiari malformation type 1,  who is undergoing right ventriculostomy procedure using optical tracking system today, for ICP monitoring.   The risks and benefits of the procedure were discussed.   The patient provided consent for the above mentioned procedure.   She reports a mild occipital headache this morning, otherwise doing okay.      NEUROLOGIC EXAM:  -- Awake; Alert; oriented x 3  -- Follows commands briskly  -- Speech fluent, spontaneous. No aphasia or dysarthria.  -- no gaze preference. No apparent hemineglect.  Cranial Nerves:  -- PERRL 3-2mm bilat and brisk, extraocular movements intact  -- face symmetrical, tongue midline  -- sensory V1-V3 intact bilaterally  -- palate elevates symmetrically, uvula midline  -- hearing grossly intact bilat    Extremities:  -- Full strength 5/5 throughout all 4 extremities  -- Sensation intact to light touch      Addy Shea MD, PGY-1  Department of Neurosurgery  Pager: [phone]    Please contact neurosurgery resident on call with questions.    Dial * [phone], enter [phone] when prompted.

## 2024-08-15 NOTE — ANESTHESIA PROCEDURE NOTES
Airway         Procedure Start/Stop Times: 8/15/2024 8:03 AM  Staff -        Anesthesiologist:  Trav Akins MD       CRNA: Fariba Holley APRN CRNA       Other Anesthesia Staff: Ernestine Castro       Performed By: SRNAIndications and Patient Condition       Indications for airway management: keerthi-procedural       Induction type:intravenous       Mask difficulty assessment: 1 - vent by mask    Final Airway Details       Final airway type: endotracheal airway       Successful airway: ETT - single  Endotracheal Airway Details        ETT size (mm): 7.0       Cuffed: yes       Cuff volume (mL): 7       Successful intubation technique: video laryngoscopy       VL Blade Size: Other (comments) (hyperangulated)       Grade View of Cords: 1       Adjucts: stylet       Position: Center       Measured from: gums/teeth       Secured at (cm): 22       Bite block used: None    Post intubation assessment        Placement verified by: capnometry, equal breath sounds and chest rise        Number of attempts at approach: 1       Number of other approaches attempted: 0       Secured with: tape       Ease of procedure: easy       Dentition: Intact and Unchanged    Medication(s) Administered   Medication Administration Time: 8/15/2024 8:03 AM

## 2024-08-15 NOTE — ANESTHESIA POSTPROCEDURE EVALUATION
Patient: Paula Back    Procedure: Procedure(s):  VENTRICULOSTOMY, CEREBRAL VENTRICLE, USING OPTICAL TRACKING SYSTEM       Anesthesia Type:  General    Note:  Disposition: Inpatient   Postop Pain Control: Uneventful            Sign Out: Well controlled pain   PONV: No   Neuro/Psych: Uneventful            Sign Out: Acceptable/Baseline neuro status   Airway/Respiratory: Uneventful            Sign Out: Acceptable/Baseline resp. status   CV/Hemodynamics: Uneventful            Sign Out: Acceptable CV status; No obvious hypovolemia; No obvious fluid overload   Other NRE: NONE   DID A NON-ROUTINE EVENT OCCUR? No       Last vitals:  Vitals Value Taken Time   /74 08/15/24 1230   Temp 36.9  C (98.5  F) 08/15/24 1004   Pulse 72 08/15/24 1239   Resp 17 08/15/24 1239   SpO2 97 % 08/15/24 1239   Vitals shown include unfiled device data.    Electronically Signed By: Yuliya Tam MD  August 15, 2024  12:40 PM

## 2024-08-15 NOTE — LETTER
Transition Communication Hand-off for Care Transitions to Next Level of Care Provider    Name: Paula Back  : 1990  MRN #: [id number]  Primary Care Provider: Rigoberto French     Primary Clinic: [address]  [place] [address]     Reason for Hospitalization:  Chiari malformation type I (H) [G93.5]  Admit Date/Time: 8/15/2024  5:47 AM  Discharge Date: 2024  Payor Source: Payor: BCBS / Plan: [place] [place] / Product Type: Indemnity /       Plan of Care Goals/Milestone Events:          Reason for Communication Hand-off Referral: {CCREASONCMCTNHANDOFFREFERRALCTS:91467}    Discharge Plan:       Concern for non-adherence with plan of care:   Y/N ***  Discharge Needs Assessment:  Needs      Flowsheet Row Most Recent Value   Equipment Currently Used at Home none            Already enrolled in Tele-monitoring program and name of program:  ***  Follow-up specialty is recommended: {YES / NO:72147}    Follow-up plan:    Future Appointments   Date Time Provider Department Center   2024  7:00 AM [facility] OT WAITLIST [facility]   10/14/2024  3:30 PM Brandon Bautista MD [facility]       Any outstanding tests or procedures:              Key Recommendations:      FERNANDO Hancock    AVS/Discharge Summary is the source of truth; this is a helpful guide for improved communication of patient story

## 2024-08-15 NOTE — BRIEF OP NOTE
[facility]    Brief Operative Note    Pre-operative diagnosis: Chiari malformation type I (H) [G93.5], Idiopathic Intracranial Hypertension possibility  Post-operative diagnosis Same as pre-operative diagnosis    Procedure: VENTRICULOSTOMY, CEREBRAL VENTRICLE, USING OPTICAL TRACKING SYSTEM, Right - Head    Surgeon: Surgeons and Role:     * Micheal Olivares MD - Primary     * Addy Shea MD - Assisting  Anesthesia: General   Estimated Blood Loss: Minimal    Drains: EVD  Specimens: * No specimens in log *  Findings:   None.  Complications: None.  Implants: * No implants in log *

## 2024-08-15 NOTE — ANESTHESIA CARE TRANSFER NOTE
Patient: Paula Back    Procedure: Procedure(s):  VENTRICULOSTOMY, CEREBRAL VENTRICLE, USING OPTICAL TRACKING SYSTEM       Diagnosis: Chiari malformation type I (H) [G93.5]  Diagnosis Additional Information: No value filed.    Anesthesia Type:   General     Note:    Oropharynx: oropharynx clear of all foreign objects and spontaneously breathing  Level of Consciousness: awake  Oxygen Supplementation: nasal cannula  Level of Supplemental Oxygen (L/min / FiO2): 4  Independent Airway: airway patency satisfactory and stable  Dentition: dentition unchanged  Vital Signs Stable: post-procedure vital signs reviewed and stable  Report to RN Given: handoff report given  Patient transferred to: PACU    Handoff Report: Identifed the Patient, Identified the Reponsible Provider, Reviewed the pertinent medical history, Discussed the surgical course, Reviewed Intra-OP anesthesia mangement and issues during anesthesia, Set expectations for post-procedure period and Allowed opportunity for questions and acknowledgement of understanding      Vitals:  Vitals Value Taken Time   /68    Temp     Pulse 105 08/15/24 1003   Resp 14    SpO2 99 % 08/15/24 1003   Vitals shown include unfiled device data.    Electronically Signed By: BIRGIT Alonzo CRNA  August 15, 2024  10:04 AM

## 2024-08-16 ENCOUNTER — APPOINTMENT (OUTPATIENT)
Dept: OCCUPATIONAL THERAPY | Facility: CLINIC | Age: 34
End: 2024-08-16
Payer: COMMERCIAL

## 2024-08-16 LAB
ANION GAP SERPL CALCULATED.3IONS-SCNC: 7 MMOL/L (ref 7–15)
BUN SERPL-MCNC: 5.7 MG/DL (ref 6–20)
CALCIUM SERPL-MCNC: 8.6 MG/DL (ref 8.8–10.4)
CHLORIDE SERPL-SCNC: 105 MMOL/L (ref 98–107)
CREAT SERPL-MCNC: 0.73 MG/DL (ref 0.51–0.95)
EGFRCR SERPLBLD CKD-EPI 2021: >90 ML/MIN/1.73M2
ERYTHROCYTE [DISTWIDTH] IN BLOOD BY AUTOMATED COUNT: 12.7 % (ref 10–15)
GLUCOSE SERPL-MCNC: 121 MG/DL (ref 70–99)
HCO3 SERPL-SCNC: 25 MMOL/L (ref 22–29)
HCT VFR BLD AUTO: 32.7 % (ref 35–47)
HGB BLD-MCNC: 10.8 G/DL (ref 11.7–15.7)
MAGNESIUM SERPL-MCNC: 2 MG/DL (ref 1.7–2.3)
MCH RBC QN AUTO: 29.8 PG (ref 26.5–33)
MCHC RBC AUTO-ENTMCNC: 33 G/DL (ref 31.5–36.5)
MCV RBC AUTO: 90 FL (ref 78–100)
PHOSPHATE SERPL-MCNC: 4.1 MG/DL (ref 2.5–4.5)
PLATELET # BLD AUTO: 288 10E3/UL (ref 150–450)
POTASSIUM SERPL-SCNC: 3.9 MMOL/L (ref 3.4–5.3)
RBC # BLD AUTO: 3.62 10E6/UL (ref 3.8–5.2)
SODIUM SERPL-SCNC: 137 MMOL/L (ref 135–145)
WBC # BLD AUTO: 7.3 10E3/UL (ref 4–11)

## 2024-08-16 PROCEDURE — 250N000013 HC RX MED GY IP 250 OP 250 PS 637

## 2024-08-16 PROCEDURE — 84100 ASSAY OF PHOSPHORUS: CPT

## 2024-08-16 PROCEDURE — 200N000002 HC R&B ICU UMMC

## 2024-08-16 PROCEDURE — 80048 BASIC METABOLIC PNL TOTAL CA: CPT

## 2024-08-16 PROCEDURE — 999N000147 HC STATISTIC PT IP EVAL DEFER

## 2024-08-16 PROCEDURE — 99253 IP/OBS CNSLTJ NEW/EST LOW 45: CPT | Performed by: NURSE PRACTITIONER

## 2024-08-16 PROCEDURE — 36415 COLL VENOUS BLD VENIPUNCTURE: CPT

## 2024-08-16 PROCEDURE — 250N000011 HC RX IP 250 OP 636

## 2024-08-16 PROCEDURE — 85014 HEMATOCRIT: CPT

## 2024-08-16 PROCEDURE — 97110 THERAPEUTIC EXERCISES: CPT | Mod: GO | Performed by: OCCUPATIONAL THERAPIST

## 2024-08-16 PROCEDURE — 97165 OT EVAL LOW COMPLEX 30 MIN: CPT | Mod: GO | Performed by: OCCUPATIONAL THERAPIST

## 2024-08-16 PROCEDURE — 250N000011 HC RX IP 250 OP 636: Mod: JZ | Performed by: NEUROLOGICAL SURGERY

## 2024-08-16 PROCEDURE — 97535 SELF CARE MNGMENT TRAINING: CPT | Mod: GO | Performed by: OCCUPATIONAL THERAPIST

## 2024-08-16 PROCEDURE — 83735 ASSAY OF MAGNESIUM: CPT

## 2024-08-16 RX ORDER — CELECOXIB 200 MG/1
200 CAPSULE ORAL 2 TIMES DAILY PRN
Status: DISCONTINUED | OUTPATIENT
Start: 2024-08-16 | End: 2024-08-17 | Stop reason: HOSPADM

## 2024-08-16 RX ORDER — LORAZEPAM 2 MG/ML
1 INJECTION INTRAMUSCULAR ONCE
Status: DISCONTINUED | OUTPATIENT
Start: 2024-08-16 | End: 2024-08-17 | Stop reason: HOSPADM

## 2024-08-16 RX ORDER — LIDOCAINE HYDROCHLORIDE AND EPINEPHRINE 10; 10 MG/ML; UG/ML
5 INJECTION, SOLUTION INFILTRATION; PERINEURAL ONCE
Status: COMPLETED | OUTPATIENT
Start: 2024-08-16 | End: 2024-08-17

## 2024-08-16 RX ORDER — PROCHLORPERAZINE MALEATE 5 MG
10 TABLET ORAL EVERY 6 HOURS PRN
Status: DISCONTINUED | OUTPATIENT
Start: 2024-08-16 | End: 2024-08-17 | Stop reason: HOSPADM

## 2024-08-16 RX ORDER — TOPIRAMATE 50 MG/1
50 TABLET, FILM COATED ORAL AT BEDTIME
Status: DISCONTINUED | OUTPATIENT
Start: 2024-08-16 | End: 2024-08-17 | Stop reason: HOSPADM

## 2024-08-16 RX ADMIN — CEFAZOLIN SODIUM 2 G: 2 INJECTION, SOLUTION INTRAVENOUS at 09:08

## 2024-08-16 RX ADMIN — DULOXETINE HYDROCHLORIDE 30 MG: 30 CAPSULE, DELAYED RELEASE ORAL at 20:24

## 2024-08-16 RX ADMIN — SENNOSIDES AND DOCUSATE SODIUM 1 TABLET: 8.6; 5 TABLET ORAL at 08:53

## 2024-08-16 RX ADMIN — OXYCODONE HYDROCHLORIDE 10 MG: 10 TABLET ORAL at 19:47

## 2024-08-16 RX ADMIN — TRAZODONE HYDROCHLORIDE 50 MG: 50 TABLET ORAL at 22:21

## 2024-08-16 RX ADMIN — CEFAZOLIN SODIUM 2 G: 2 INJECTION, SOLUTION INTRAVENOUS at 16:15

## 2024-08-16 RX ADMIN — METHOCARBAMOL 500 MG: 500 TABLET ORAL at 20:24

## 2024-08-16 RX ADMIN — OXYCODONE HYDROCHLORIDE 10 MG: 10 TABLET ORAL at 04:22

## 2024-08-16 RX ADMIN — ACETAMINOPHEN 975 MG: 325 TABLET ORAL at 15:20

## 2024-08-16 RX ADMIN — ACETAMINOPHEN 975 MG: 325 TABLET ORAL at 06:08

## 2024-08-16 RX ADMIN — METHOCARBAMOL 500 MG: 500 TABLET ORAL at 13:12

## 2024-08-16 RX ADMIN — CEFAZOLIN SODIUM 2 G: 2 INJECTION, SOLUTION INTRAVENOUS at 00:11

## 2024-08-16 RX ADMIN — OXYCODONE HYDROCHLORIDE 10 MG: 10 TABLET ORAL at 08:55

## 2024-08-16 RX ADMIN — ACETAMINOPHEN 975 MG: 325 TABLET ORAL at 22:21

## 2024-08-16 RX ADMIN — OXYCODONE HYDROCHLORIDE 10 MG: 10 TABLET ORAL at 13:12

## 2024-08-16 RX ADMIN — DEXTROAMPHETAMINE SACCHARATE, AMPHETAMINE ASPARTATE, DEXTROAMPHETAMINE SULFATE, AND AMPHETAMINE SULFATE 5 MG: 1.25; 1.25; 1.25; 1.25 TABLET ORAL at 09:14

## 2024-08-16 RX ADMIN — SENNOSIDES AND DOCUSATE SODIUM 1 TABLET: 8.6; 5 TABLET ORAL at 20:24

## 2024-08-16 RX ADMIN — VILAZODONE HYDROCHLORIDE 20 MG: 20 TABLET ORAL at 08:54

## 2024-08-16 RX ADMIN — HYDROMORPHONE HYDROCHLORIDE 0.4 MG: 0.2 INJECTION, SOLUTION INTRAMUSCULAR; INTRAVENOUS; SUBCUTANEOUS at 06:08

## 2024-08-16 RX ADMIN — METHOCARBAMOL 500 MG: 500 TABLET ORAL at 08:54

## 2024-08-16 ASSESSMENT — ACTIVITIES OF DAILY LIVING (ADL)
ADLS_ACUITY_SCORE: 23
PREVIOUS_RESPONSIBILITIES: MEAL PREP;HOUSEKEEPING;LAUNDRY;SHOPPING;MEDICATION MANAGEMENT;FINANCES;DRIVING;CHILD CARE
ADLS_ACUITY_SCORE: 23
ADLS_ACUITY_SCORE: 22
ADLS_ACUITY_SCORE: 23

## 2024-08-16 ASSESSMENT — VISUAL ACUITY: OU: NORMAL ACUITY

## 2024-08-16 NOTE — OP NOTE
Pre-operative diagnosis:         Chiari malformation type I (H) [G93.5], Idiopathic Intracranial Hypertension possibility, question of intracranial hypertension based on symptoms  Post-operative diagnosis        Normal ICP     Procedure:        Placement of external ventricular drain  Frameless stereotactic image guidance     Surgeon:         Surgeons and Role:     * Micheal Olivares MD - Primary     * Addy Shea MD - Assisting  Anesthesia:     General             Estimated Blood Loss: Minimal (<10cc)     Drains: EVD  Specimens:     * No specimens in log *  Findings:                     None.  Complications:            None.  Implants:         * No implants in log *    Indications: This is a 34-year-old who presents with headaches and unclear history of possible intracranial hypertension.  She has not clearly had documented high intracranial pressure on prior studies.  She has previously been treated with Diamox but has not been taking this medication.  Due to the unclear nature of her intracranial pressure, we discussed options that include lumbar puncture and direct intracranial pressure monitoring.  We discussed the possible benefits of external ventricular drain placement include #1 true measure intracranial pressure and #2 if high or questionable high intracranial pressure is noted, there is an ability to drain fluid.    Procedure: After informed consent, the patient was brought back to the operating room where she underwent general endotracheal anesthesia.  She had previously undergone a Stealth CT scan.  The patient was positioned in the supine position, with her head resting on a Champagne horseshoe headrest.  All pressure points were padded.  The Stealth Axiem navigation system was registered to her scalp with accuracy of less than 2 mm.  The proposed entry point was marked, clipped, prepped and draped in the usual sterile manner.  The proposed incision was infiltrated with 0.25% lidocaine  with epinephrine and 1 200,000.  Incision was made using a #15 blade.  The underlying calvarium was exposed.  A small bur hole measuring approximately 5 mm was made using the high-speed drill.  The underlying dura was coagulated and opened in a cruciate manner.  Using stereotactic guidance, the ventricular drain was placed directly into the right lateral ventricle with clear cerebrospinal fluid under appearance normal pressure obtained.  The drain was clamped and you immediately did not allow access cerebrospinal fluid to be removed in the ventricles.  The drain was tunneled through a separate stab opening and secured using a 3-0 nylon suture.  The wound was greeted copiously.  The galea was closed using interrupted inverted 3-0 Vicryl sutures and the scalp was closed with a running 3-0 Monocryl.  Bacitracin was applied.  System was secured to a drainage system.  The patient was extubated in stable condition and transported to recovery.  All sponge, needle and instrument counts were correct following the procedure.  I was present for the entire procedure scrubbed.

## 2024-08-16 NOTE — PROVIDER NOTIFICATION
NSG notified of pt having an episode of pressure in R side of head/R ear with numbness & tingling in in R hand/arm/some of R leg. ICP increased up to 28 for roughly 10-20 seconds. ICP back down to 15 - with some pressure and n/t remaining. This happened while pt was just sitting in chair.

## 2024-08-16 NOTE — PLAN OF CARE
Goal Outcome Evaluation:      Plan of Care Reviewed With: patient    Overall Patient Progress: improvingOverall Patient Progress: improving     .Neuro: A/Ox4. Calls appropriately. Follows commands. PERRLA. CAM assessment negative. Purposefully moves all extremities. SBA for cord management and orthostatic hypotension. EVD, clamped, 10 above EAM, ICPs 9-18.   Cardiac/Tele:  SR and VSS. HR . MAP >65. Afebrile. Denies chest pain   Respiratory: Room air. Tolerating well. LS clear  GI/: Adequate UOP. LBM PTA.  Diet/Appetite: Regular diet  Skin: See Flowsheet  Endocrine: n/a  LDAs: EVD  Activity: Generalized weakness  Pain: Pain at EVD site and headached, PRN oxycodone and dilaudid given.      Plan: Continue to monitor and notify team with changes.

## 2024-08-16 NOTE — PLAN OF CARE
Admitted/transferred from: PACU at 1815  Reason for admission/transfer: EVD; ICP monitoring  2 RN skin assessment: completed by Candace ESPITIA  Result of skin assessment and interventions/actions: R ventriculostomy; R head surgical incision  Height, weight, drug calc weight: Done  Patient belongings (see Flowsheet): Arrived with patient  MDRO education added to care planYes    Arrived from PACU at [id number] after CT completed. 9/10 R head incisional pain; pt emotional, crying, shaking; NSGY notified. Q1H neuro assessments. A&Ox4. Follows commands and NARANJO. R sided facial numbness baseline per pt report; no other neurological deficits. PERRLA. Denies vision changes; denies numbness and tingling in rest of body. EVD at 10mmHg of water; clamped per order; ICPs 2-12. Afebrile; SB/ST 50s-100s. SBP goal < 140 maintained without intervention. LS clear/diminished on 3L NC. Clear liquid diet; tolerating sips of water and apple juice. Denies nausea. Voiding spontaneously; no UOP since arrival from PACU. SisterLisa, at bedside.     Plan: Q1H neuro assessments. ICP monitoring.   For vital signs and complete assessments, please see documentation flowsheets.      Goal Outcome Evaluation:      Plan of Care Reviewed With: patient    Overall Patient Progress: no changeOverall Patient Progress: no change    Outcome Evaluation: Pain uncontrolled; NSGY team notified.

## 2024-08-16 NOTE — PHARMACY-ADMISSION MEDICATION HISTORY
Pharmacy Intern Admission Medication History    Admission medication history is complete. The information provided in this note is only as accurate as the sources available at the time of the update.    Information Source(s): Patient and CareEverywhere/SureScripts via in-person    Pertinent Information:   Patient recently increased Victoza dose 4 days ago from 0.6 mg -> 1.2 mg daily    Patient last week increased topiramate in accordance with prescription up to 50 mg daily due to increased pressure in the head which helped, has since decreased back to 25 mg at bedtime     Patient uses Adderall 30 mg XR daily and 5 mg IR PRN in afternoons, fills at [facility], dispense history reports only through 3/19/24    Changes made to PTA medication list:  Added:   Adderall 5 mg IR  Ondansetron 8 mg tab  Deleted:   Adderall 5 mg XR  Duplicate liraglutide  Changed:   Tramadol 50 mg -> 100 mg every day   Added ibuprofen dose and frequency    Allergies reviewed with patient and updates made in EHR: yes    Medication History Completed By: Kevon Gresham 8/15/2024 7:47 PM    PTA Med List   Medication Sig Last Dose    acetaminophen (TYLENOL) 325 MG tablet Take 650 mg by mouth every 4 hours as needed for pain Past Month    amphetamine-dextroamphetamine (ADDERALL XR) 30 MG 24 hr capsule Take 30 mg by mouth every morning 8/14/2024 at am    amphetamine-dextroamphetamine (ADDERALL) 5 MG tablet Take 5 mg by mouth daily as needed (Take in the afternoon if needed)     clindamycin (CLINDAMAX) 1 % external gel Apply topically 2 times daily as needed (acne) Past Month    DULoxetine (CYMBALTA) 30 MG capsule Take 1 capsule by mouth every evening 8/14/2024 at pm    ibuprofen (ADVIL/MOTRIN) 200 MG tablet Take 400 mg by mouth every 8 hours as needed for pain Past Month    liraglutide (VICTOZA) 18 MG/3ML solution Inject 1.2 mg subcutaneously daily 8/13/2024    LORazepam (ATIVAN) 0.5 MG tablet Take 1 tablet by mouth daily as needed Past Month     ondansetron (ZOFRAN) 8 MG tablet Take 8 mg by mouth every 8 hours as needed for nausea 8/13/2024    propranolol (INDERAL) 10 MG tablet Take 1 tablet by mouth every morning 8/14/2024 at am    topiramate (TOPAMAX) 25 MG tablet Start with 25 mg once per day at nightThen after 2 weeks could go to twice daily.  Then after 2 weeks can go to 50 mg twice per day then wait at this dose for a bit to have its effect.  Stopping along the way if any increased side effects or problems (Patient taking differently: Take 25 mg by mouth at bedtime Start with 25 mg once per day at nightThen after 2 weeks could go to twice daily.  Then after 2 weeks can go to 50 mg twice per day then wait at this dose for a bit to have its effect.  Stopping along the way if any increased side effects or problems)     traMADol (ULTRAM) 50 MG tablet Take 100 mg by mouth daily 8/14/2024    traZODone (DESYREL) 50 MG tablet Take 50 mg by mouth at bedtime 8/14/2024 at pm    vilazodone (VIIBRYD) 20 MG TABS tablet Take 20 mg by mouth every morning 8/14/2024 at am

## 2024-08-16 NOTE — PLAN OF CARE
Goal Outcome Evaluation:      Plan of Care Reviewed With: patient    Overall Patient Progress: improvingOverall Patient Progress: improving         Neuro: A&O x4. R sided facial numbness at baseline. SBA. HA complaint - oxy, robaxin, Celebrex started. Anxious at times. Complaint of R ear pressure & n/t episode on R side of body - see provider notification nurse to NSG; pt reports improvement. EVD at 10 above, clamped. ICP 8-15, 28 once during episode.  CV: HR 70-80s. SBP goal <140. Afebrile.   Pulm: RA. Clear lungs.  GI: Regular diet, good appeitite.  : Voiding spontaneously.   IV/Gtt: PIV.     Plan: Removed EVD tomorrow 8/17 AM & possible discharge home if everything goes as planned.    Daily Irwin RN

## 2024-08-16 NOTE — CONSULTS
Neurocritical Care Consultation    Reason for critical care consult: Chiari malformation   Admitting Team: [facility]  Date of Service:  08/16/2024  Date of Admission:  8/15/2024  Hospital Day: 2    History of Present Illness:  Paula Back is a 34 year old female with a past medical history of chiari malformation, headaches, and unclear history of possible intracranial hypertension who was admitted on 8/15/2024 for right frontal ventriculostomy placement and ICP monitoring.    NCC was consulted on 8/16 for headache management.      Neuro  #Chiari malformation  #Headaches  -Increase Topiramate to 50 mg at bedtime  -In 2 weeks can increase to 50 mg BID    -Given risk of bleeding with EVD, avoiding NSAIDS at this time. Can trial Celebrex 200 mg BID PRN  -Instructed patient that narcotics are not great for headache management, however, she would like to continue this to help with her incisional pain.  -Offer Compazine 5-10 mg Q6h PRN, headache was added as a PRN reason to give  -Recommend outpatient headache specialist  -NCC will sign off. Please feel free to call us back with any further questions or concerns.                I spent 40 minutes of critical care time on the unit/floor managing the care of Paula Back excluding time performing procedures. Upon evaluation, this patient had a high probability of imminent or life-threatening deterioration due to headaches, which required my direct attention, intervention, and personal management. Greater than 50% of my time was spent at the bedside counseling the patient and/or coordinating care including chart review, history, exam, documentation, and further activities per this note. I have personally reviewed the following data/imaging over the past 24 hours.     The patient was discussed with the NCC attending, Dr. Ram.    Arleth GENAO, CNP  Neurocritical care nurse practitioner  Ascom: [phone] available M-[facility] [phone]       Current Medications:  Current  Facility-Administered Medications   Medication Dose Route Frequency Provider Last Rate Last Admin    acetaminophen (TYLENOL) tablet 975 mg  975 mg Oral Q8H Addy Shea MD   975 mg at 08/16/24 0608    amphetamine-dextroamphetamine (ADDERALL XR) ER cap 30 mg  30 mg Oral QAM Anam Krishnamurthy MD        ceFAZolin (ANCEF) 2 g in 100 mL D5W intermittent infusion  2 g Intravenous Q8H Micheal Olivares  mL/hr at 08/16/24 0908 2 g at 08/16/24 0908    DULoxetine (CYMBALTA) DR capsule 30 mg  30 mg Oral QPM Anam Krishnamurthy MD   30 mg at 08/15/24 2008    lidocaine 1% with EPINEPHrine 1:100,000 injection 5 mL  5 mL Intradermal Once Anam Krishnamurthy MD        LORazepam (ATIVAN) injection 1 mg  1 mg Intravenous Once Anam Krishnamurthy MD        methocarbamol (ROBAXIN) tablet 500 mg  500 mg Oral TID Addy Shea MD   500 mg at 08/16/24 0854    polyethylene glycol (MIRALAX) Packet 17 g  17 g Oral Daily Anam Krishnamurthy MD        propranolol (INDERAL) tablet 10 mg  10 mg Oral Anam Franz MD        senna-docusate (SENOKOT-S/PERICOLACE) 8.6-50 MG per tablet 1 tablet  1 tablet Oral BID Anam Krishnamurthy MD   1 tablet at 08/16/24 0853    sodium chloride (PF) 0.9% PF flush 3 mL  3 mL Intracatheter Q8H Anam Krishnamurthy MD   3 mL at 08/16/24 0015    topiramate (TOPAMAX) tablet 50 mg  50 mg Oral At Bedtime Arleth Moreau CNP        traZODone (DESYREL) tablet 50 mg  50 mg Oral At Bedtime Anam Krishnamurthy MD   50 mg at 08/15/24 2208    vilazodone (VIIBRYD) tablet 20 mg  20 mg Oral Anam Franz MD   20 mg at 08/16/24 0854       PRN Medications:  Current Facility-Administered Medications   Medication Dose Route Frequency Provider Last Rate Last Admin    [START ON 8/18/2024] acetaminophen (TYLENOL) tablet 650 mg  650 mg Oral Q4H PRN Addy Shea MD        amphetamine-dextroamphetamine (ADDERALL) per tablet 5 mg  5 mg Oral Daily PRN Anam Krishnamurthy MD   5 mg at 08/16/24 0914    [START ON  8/18/2024] bisacodyl (DULCOLAX) suppository 10 mg  10 mg Rectal Daily PRN Anam Krishnamurthy MD        celecoxib (celeBREX) capsule 200 mg  200 mg Oral BID PRN Arleth Moreau CNP        diazepam (VALIUM) injection 5 mg  5 mg Intravenous Q8H PRN Addy Shea MD   5 mg at 08/15/24 1734    flumazenil (ROMAZICON) injection 0.2 mg  0.2 mg Intravenous q1 min prn Addy Shea MD        HYDROmorphone (DILAUDID) injection 0.2 mg  0.2 mg Intravenous Q2H PRN Addy Shea MD        Or    HYDROmorphone (DILAUDID) injection 0.4 mg  0.4 mg Intravenous Q2H PRN Addy Shea MD   0.4 mg at 08/16/24 0608    [Held by provider] ibuprofen (ADVIL/MOTRIN) tablet 200 mg  200 mg Oral Q6H PRN Anam Krishnamurthy MD        lidocaine (LMX4) cream   Topical Q1H PRN Anam Krishnamurthy MD        lidocaine 1 % 0.1-1 mL  0.1-1 mL Other Q1H PRN Anam Krishnamurthy MD        LORazepam (ATIVAN) tablet 0.5 mg  0.5 mg Oral Daily PRN Anam Krishnamurthy MD        [START ON 8/17/2024] magnesium hydroxide (MILK OF MAGNESIA) suspension 30 mL  30 mL Oral Daily PRN Anam Krishnamurthy MD        naloxone (NARCAN) injection 0.2 mg  0.2 mg Intramuscular Q2 Min PRN Addy Shea MD        naloxone (NARCAN) injection 0.4 mg  0.4 mg Intramuscular Q2 Min PRN Addy Shea MD        ondansetron (ZOFRAN ODT) ODT tab 4 mg  4 mg Oral Q6H PRN Addy Shea MD        Or    ondansetron (ZOFRAN) injection 4 mg  4 mg Intravenous Q6H PRN Addy Shea MD        oxyCODONE (ROXICODONE) tablet 5 mg  5 mg Oral Q4H PRN Addy Shea MD        Or    oxyCODONE IR (ROXICODONE) tablet 10 mg  10 mg Oral Q4H PRN Addy Shea MD   10 mg at 08/16/24 0855    prochlorperazine (COMPAZINE) tablet 10 mg  10 mg Oral Q6H PRN Addy Shea MD        prochlorperazine (COMPAZINE) tablet 10 mg  10 mg Oral Q6H PRN Anam Krishnamurthy MD        sodium chloride (PF) 0.9% PF flush 3 mL  3 mL Intracatheter q1 min prn Anam Krishnamurthy MD      "   [Held by provider] traMADol (ULTRAM) tablet 50 mg  50 mg Oral Q6H PRN Anam Krishnamurthy MD           Infusions:  Current Facility-Administered Medications   Medication Dose Route Frequency Provider Last Rate Last Admin       Physical Examination:  Vitals: /69   Pulse 111   Temp 97.7  F (36.5  C) (Oral)   Resp 20   Ht 1.575 m (5' 2\")   Wt 106.4 kg (234 lb 9.1 oz)   SpO2 100%   BMI 42.90 kg/m    General: Adult female patient, sitting up in chair   HEENT: Normocephalic, atraumatic, no icterus  Cardiac: Sinus rhythm on bedside monitor   Pulm: Unlabored, expansion symmetric, no retractions or use of accessory muscles  Abdomen: Soft, non-distended abdomen   Extremities: Warm, no edema, appears adequately perfused  Skin: No rash or lesion observed on exposed skin   Psych: Calm and cooperative  Neuro:  Mental status: Awake, alert, attentive, oriented to self, time, place, and circumstance. Language is fluent and coherent with intact comprehension of complex commands, naming and repetition.  Cranial nerves: VFF, PERRL, conjugate gaze, EOMI, facial sensation intact, face symmetric, shoulder shrug strong, tongue midline, no dysarthria.   Motor: Normal bulk and tone. No abnormal movements. 5/5 strength in 4/4 extremities.   Sensory: Intact to light touch x 4 extremities  Coordination: FNF and HS without ataxia or dysmetria.   Gait: HAILE, deferred.      Labs and Imaging:    All relevant imaging and laboratory values personally reviewed.   "

## 2024-08-16 NOTE — PROGRESS NOTES
08/16/24 1400   Appointment Info   Signing Clinician's Name / Credentials (OT) karsten de leon ot/l   Living Environment   People in Home child(shyanne), dependent;significant other   Current Living Arrangements house   Home Accessibility stairs to enter home;stairs within home   Number of Stairs, Main Entrance 2   Number of Stairs, Within Home, Primary greater than 10 stairs  (split level 7 up 7 down)   Transportation Anticipated family or friend will provide;car, drives self   Self-Care   Usual Activity Tolerance good   Current Activity Tolerance moderate   Equipment Currently Used at Home none   Fall history within last six months no   Instrumental Activities of Daily Living (IADL)   Previous Responsibilities meal prep;housekeeping;laundry;shopping;medication management;finances;driving;   General Information   Referring Physician Anam Krishnamurthy   Patient/Family Therapy Goal Statement (OT) return to PLOF   Additional Occupational Profile Info/Pertinent History of Current Problem Paula Back is a 34 year old female who presented with a history of chiari malformation and headaches, who is now:     POD 1 from right frontal ventriculostomy placement   Existing Precautions/Restrictions fall;other (see comments)  (craniotomy)   General Observations and Info pt motivated in therapy.   Cognitive Status Examination   Orientation Status orientation to person, place and time   Cognitive Status Comments pt with no appearent cognitive deficits, pt with linear andmetaphoric logic as well as situational/semantic memory without functional deficit.   Visual Perception   Visual Impairment/Limitations WFL   Sensory   Sensory Quick Adds sensation intact   Range of Motion Comprehensive   General Range of Motion no range of motion deficits identified   Strength Comprehensive (MMT)   General Manual Muscle Testing (MMT) Assessment no strength deficits identified;other (see comments)   Comment, General Manual Muscle Testing  (MMT) Assessment mild deconditioning in post operative arena.   Coordination   Upper Extremity Coordination No deficits were identified   Bed Mobility   Bed Mobility No deficits identified   Transfers   Transfers bed-chair transfer;sit-stand transfer   Transfer Skill: Bed to Chair/Chair to Bed   Bed-Chair [place] (Transfers) contact guard   Sit-Stand Transfer   Sit-Stand [place] (Transfers) contact guard   Balance   Balance Assessment no deficits identified   Activities of Daily Living   BADL Assessment/Intervention lower body dressing   Lower Body Dressing Assessment/Training   Comment, (Lower Body Dressing) education required.   Clinical Impression   Criteria for Skilled Therapeutic Interventions Met (OT) Yes, treatment indicated   Assessment of Occupational Performance 3-5 Performance Deficits   Identified Performance Deficits dressing, bathing, toielting, home making.   Clinical Decision Making Complexity (OT) problem focused assessment/low complexity   Risk & Benefits of therapy have been explained evaluation/treatment results reviewed;care plan/treatment goals reviewed;risks/benefits reviewed;current/potential barriers reviewed;participants voiced agreement with care plan;participants included;patient   Clinical Impression Comments pt presents to OT with post surgical precuations and general deconditioning leading to decreased ADL I   OT Total Evaluation Time   OT Eval, Low Complexity Minutes (29915) 5   OT Goals   Therapy Frequency (OT) 2 times/week   OT Predicted Duration/Target Date for Goal Attainment 08/22/24   OT Goals Hygiene/Grooming;Upper Body Dressing;Lower Body Dressing;Upper Body Bathing;Lower Body Bathing;Toilet Transfer/Toileting;OT Goal 1   OT: Hygiene/Grooming within precautions;independent   OT: Upper Body Dressing Modified independent   OT: Lower Body Dressing Modified independent   OT: Upper Body Bathing Modified independent   OT: Lower Body Bathing Modified independent   OT:  Toilet Transfer/Toileting Modified independent   OT: Goal 1 pt will ascend/descend 10 stairs mod I.   OT Discharge Planning   OT Plan stairs and consider DISCH, shower?   OT Discharge Recommendation (DC Rec) home with assist   OT Rationale for DC Rec pt expected to progress well   OT Brief overview of current status Pt ambualting in hallway greater than 500 feet progressing from using IV pole to unsupported SBA.   Total Session Time   Total Session Time (sum of timed and untimed services) 5

## 2024-08-16 NOTE — PLAN OF CARE
4E Defer Physical Therapy - Per chart review and discussion with Occupational Therapy, Pt ambulating with SBA and no AD. Plan for OT to continue to follow to address progression of functional independence and stairs as needed. Pt with no skilled inpatient PT needs, Will complete consult and defer evaluation, please reconsult as appropriate if patient has decline in functional mobility requiring further skilled inpatient PT needs. Defer Discharge recommendations to Occupational Therapy.

## 2024-08-16 NOTE — PROGRESS NOTES
[facility], [place]   08/16/2024  Neurosurgery Progress Note:    Interval History:   No acute events overnight  Patient having significant pain, somewhat improved with repositioning for bandages. Oxy/Dilaudid given for incisional pain.   ICPs overnight: 9-14 (WNL)    Assessment:  Paula Back is a 34 year old female who presented with a history of chiari malformation and headaches, who is now:    POD 1 from right frontal ventriculostomy placement    Plan:  Discussion with staff daily regarding EVD removal  EVD clamped  Serial neuro exams  Pain control  HOB > 30 degrees  Advance diet as tolerated  Bowel regimen  PRN antiemetics  IVF until taking adequate PO  PT/OT  SCDs for DVT proph    Discussed with staff: Dr. Olivares    -----------------------------------  Mike Valadez MD  PGY-2 Department of Neurosurgery  [facility] [place]  Pager: [phone]  On-call: [phone]  Please page/VOCERA on-call neurosurgery with questions  -----------------------------------    Objective:   Temp:  [97.2  F (36.2  C)-98.7  F (37.1  C)] 97.7  F (36.5  C)  Pulse:  [] 70  Resp:  [8-22] 20  BP: ()/(47-90) 93/47  SpO2:  [95 %-100 %] 100 %  I/O last 3 completed shifts:  In: 1537.5 [I.V.:1537.5]  Out: 605 [Urine:600; Blood:5]    Gen: Appears comfortable, NAD  Incision: clean, dry, intact  Neurologic:  Alert & Oriented to person, place, time, and situation  Follows commands briskly  Speech fluent, spontaneous. No aphasia or dysarthria.  No gaze preference. No apparent hemineglect.  PERRL, EOMI  Face symmetric with sensation intact to light touch  Palate elevates symmetrically, uvula midline, tongue protrudes midline  Trapezii muscles 5/5 bilaterally  No pronator drift     Del Tr Bi WE WF Gr   R 5 5 5 5 5 5   L 5 5 5 5 5 5    HF KE KF DF PF EHL   R 5 5 5 5 5 5   L 5 5 5 5 5 5     Reflexes 2+ throughout  Sensation intact and symmetric to light touch throughout    LABS:  Recent  Labs   Lab 08/16/24  0509 08/15/24  1818 08/15/24  0729 08/15/24  0629     --   --  142   POTASSIUM 3.9  --   --  4.1   CHLORIDE 105  --   --  110*   CO2 25  --   --  21*   ANIONGAP 7  --   --  11   * 128* 98 104*   BUN 5.7*  --   --  9.6   CR 0.73  --   --  0.86   HAM 8.6*  --   --  8.9     Recent Labs   Lab 08/16/24  0509   WBC 7.3   RBC 3.62*   HGB 10.8*   HCT 32.7*   MCV 90   MCH 29.8   MCHC 33.0   RDW 12.7          IMAGING:  Recent Results (from the past 24 hour(s))   CT Head w/o Contrast    Narrative    CT HEAD W/O CONTRAST 8/15/2024 6:13 PM    Provided History: Status post external ventricular drain placement    Comparison: CT head 8/15/2024 at 7:19 AM.    Technique: Using multidetector thin collimation helical acquisition  technique, axial, coronal and sagittal CT images from the skull base  to the vertex were obtained without intravenous contrast.     Findings:      Right frontal approach ventriculostomy catheter with tip terminating  near the foramen of Monro. Decompressed ventricular system, no  ventriculomegaly. No intracranial hemorrhage. No mass effect. No  midline shift. No extra-axial fluid collection. The gray to white  matter differentiation of the cerebral hemispheres is preserved.  Ventricles are proportionate to the sulci. No sulcal effacement. The  basal cisterns are patent.    Scattered mucosal thickening in the paranasal sinuses. The mastoid air  cells are clear. Orbits appear unremarkable. No acute fracture.      Impression    Impression: No acute intracranial pathology.    I have personally reviewed the examination and initial interpretation  and I agree with the findings.    SUSAN YUAN MD         SYSTEM ID:  [id number]

## 2024-08-17 VITALS
RESPIRATION RATE: 16 BRPM | WEIGHT: 239.86 LBS | TEMPERATURE: 98.2 F | HEART RATE: 75 BPM | DIASTOLIC BLOOD PRESSURE: 58 MMHG | SYSTOLIC BLOOD PRESSURE: 102 MMHG | BODY MASS INDEX: 44.14 KG/M2 | OXYGEN SATURATION: 100 % | HEIGHT: 62 IN

## 2024-08-17 LAB
ANION GAP SERPL CALCULATED.3IONS-SCNC: 8 MMOL/L (ref 7–15)
ATRIAL RATE - MUSE: 62 BPM
BUN SERPL-MCNC: 14.8 MG/DL (ref 6–20)
CALCIUM SERPL-MCNC: 8.5 MG/DL (ref 8.8–10.4)
CHLORIDE SERPL-SCNC: 105 MMOL/L (ref 98–107)
CREAT SERPL-MCNC: 0.77 MG/DL (ref 0.51–0.95)
DIASTOLIC BLOOD PRESSURE - MUSE: NORMAL MMHG
EGFRCR SERPLBLD CKD-EPI 2021: >90 ML/MIN/1.73M2
ERYTHROCYTE [DISTWIDTH] IN BLOOD BY AUTOMATED COUNT: 13.2 % (ref 10–15)
GLUCOSE SERPL-MCNC: 110 MG/DL (ref 70–99)
HCO3 SERPL-SCNC: 24 MMOL/L (ref 22–29)
HCT VFR BLD AUTO: 32.1 % (ref 35–47)
HGB BLD-MCNC: 10.5 G/DL (ref 11.7–15.7)
INTERPRETATION ECG - MUSE: NORMAL
MAGNESIUM SERPL-MCNC: 1.7 MG/DL (ref 1.7–2.3)
MCH RBC QN AUTO: 29.9 PG (ref 26.5–33)
MCHC RBC AUTO-ENTMCNC: 32.7 G/DL (ref 31.5–36.5)
MCV RBC AUTO: 92 FL (ref 78–100)
P AXIS - MUSE: 53 DEGREES
PHOSPHATE SERPL-MCNC: 5 MG/DL (ref 2.5–4.5)
PLATELET # BLD AUTO: 251 10E3/UL (ref 150–450)
POTASSIUM SERPL-SCNC: 3.5 MMOL/L (ref 3.4–5.3)
PR INTERVAL - MUSE: 142 MS
QRS DURATION - MUSE: 76 MS
QT - MUSE: 412 MS
QTC - MUSE: 418 MS
R AXIS - MUSE: 23 DEGREES
RBC # BLD AUTO: 3.51 10E6/UL (ref 3.8–5.2)
SODIUM SERPL-SCNC: 137 MMOL/L (ref 135–145)
SYSTOLIC BLOOD PRESSURE - MUSE: NORMAL MMHG
T AXIS - MUSE: 4 DEGREES
VENTRICULAR RATE- MUSE: 62 BPM
WBC # BLD AUTO: 5.2 10E3/UL (ref 4–11)

## 2024-08-17 PROCEDURE — 250N000009 HC RX 250

## 2024-08-17 PROCEDURE — 84100 ASSAY OF PHOSPHORUS: CPT

## 2024-08-17 PROCEDURE — 250N000011 HC RX IP 250 OP 636: Mod: JZ | Performed by: NEUROLOGICAL SURGERY

## 2024-08-17 PROCEDURE — 36415 COLL VENOUS BLD VENIPUNCTURE: CPT

## 2024-08-17 PROCEDURE — 80048 BASIC METABOLIC PNL TOTAL CA: CPT

## 2024-08-17 PROCEDURE — 999N000248 HC STATISTIC IV INSERT WITH US BY RN

## 2024-08-17 PROCEDURE — 250N000013 HC RX MED GY IP 250 OP 250 PS 637

## 2024-08-17 PROCEDURE — 250N000013 HC RX MED GY IP 250 OP 250 PS 637: Performed by: NEUROLOGICAL SURGERY

## 2024-08-17 PROCEDURE — 83735 ASSAY OF MAGNESIUM: CPT

## 2024-08-17 PROCEDURE — 85027 COMPLETE CBC AUTOMATED: CPT

## 2024-08-17 PROCEDURE — 00P630Z REMOVAL OF DRAINAGE DEVICE FROM CEREBRAL VENTRICLE, PERCUTANEOUS APPROACH: ICD-10-PCS | Performed by: NEUROLOGICAL SURGERY

## 2024-08-17 PROCEDURE — 250N000011 HC RX IP 250 OP 636: Performed by: NURSE PRACTITIONER

## 2024-08-17 PROCEDURE — 250N000011 HC RX IP 250 OP 636

## 2024-08-17 RX ORDER — AMOXICILLIN 250 MG
1 CAPSULE ORAL 2 TIMES DAILY
Qty: 20 TABLET | Refills: 0 | Status: SHIPPED | OUTPATIENT
Start: 2024-08-17

## 2024-08-17 RX ORDER — MAGNESIUM OXIDE 400 MG/1
400 TABLET ORAL EVERY 4 HOURS
Status: COMPLETED | OUTPATIENT
Start: 2024-08-17 | End: 2024-08-17

## 2024-08-17 RX ORDER — OXYCODONE HYDROCHLORIDE 5 MG/1
5 TABLET ORAL EVERY 4 HOURS PRN
Qty: 10 TABLET | Refills: 0 | Status: SHIPPED | OUTPATIENT
Start: 2024-08-17 | End: 2024-08-24

## 2024-08-17 RX ORDER — IBUPROFEN 200 MG
400 TABLET ORAL EVERY 8 HOURS PRN
Qty: 20 TABLET | Refills: 0 | Status: SHIPPED | OUTPATIENT
Start: 2024-08-21

## 2024-08-17 RX ORDER — POTASSIUM CHLORIDE 750 MG/1
20 TABLET, EXTENDED RELEASE ORAL ONCE
Status: COMPLETED | OUTPATIENT
Start: 2024-08-17 | End: 2024-08-17

## 2024-08-17 RX ADMIN — CEFAZOLIN SODIUM 2 G: 2 INJECTION, SOLUTION INTRAVENOUS at 08:53

## 2024-08-17 RX ADMIN — CEFAZOLIN SODIUM 2 G: 2 INJECTION, SOLUTION INTRAVENOUS at 00:04

## 2024-08-17 RX ADMIN — PROCHLORPERAZINE EDISYLATE 5 MG: 5 INJECTION INTRAMUSCULAR; INTRAVENOUS at 04:00

## 2024-08-17 RX ADMIN — POTASSIUM CHLORIDE 20 MEQ: 750 TABLET, EXTENDED RELEASE ORAL at 06:14

## 2024-08-17 RX ADMIN — MAGNESIUM OXIDE TAB 400 MG (241.3 MG ELEMENTAL MG) 400 MG: 400 (241.3 MG) TAB at 08:44

## 2024-08-17 RX ADMIN — METHOCARBAMOL 500 MG: 500 TABLET ORAL at 08:44

## 2024-08-17 RX ADMIN — VILAZODONE HYDROCHLORIDE 20 MG: 20 TABLET ORAL at 08:46

## 2024-08-17 RX ADMIN — LIDOCAINE HYDROCHLORIDE,EPINEPHRINE BITARTRATE 5 ML: 10; .01 INJECTION, SOLUTION INFILTRATION; PERINEURAL at 11:59

## 2024-08-17 RX ADMIN — OXYCODONE HYDROCHLORIDE 5 MG: 5 TABLET ORAL at 14:29

## 2024-08-17 RX ADMIN — MAGNESIUM OXIDE TAB 400 MG (241.3 MG ELEMENTAL MG) 400 MG: 400 (241.3 MG) TAB at 06:14

## 2024-08-17 RX ADMIN — SENNOSIDES AND DOCUSATE SODIUM 1 TABLET: 8.6; 5 TABLET ORAL at 08:44

## 2024-08-17 RX ADMIN — OXYCODONE HYDROCHLORIDE 10 MG: 10 TABLET ORAL at 02:30

## 2024-08-17 RX ADMIN — ACETAMINOPHEN 975 MG: 325 TABLET ORAL at 14:29

## 2024-08-17 RX ADMIN — METHOCARBAMOL 500 MG: 500 TABLET ORAL at 14:29

## 2024-08-17 RX ADMIN — ACETAMINOPHEN 975 MG: 325 TABLET ORAL at 06:14

## 2024-08-17 RX ADMIN — HYDROMORPHONE HYDROCHLORIDE 0.2 MG: 0.2 INJECTION, SOLUTION INTRAMUSCULAR; INTRAVENOUS; SUBCUTANEOUS at 04:00

## 2024-08-17 RX ADMIN — LORAZEPAM 0.5 MG: 0.5 TABLET ORAL at 11:58

## 2024-08-17 RX ADMIN — HYDROMORPHONE HYDROCHLORIDE 0.4 MG: 0.2 INJECTION, SOLUTION INTRAMUSCULAR; INTRAVENOUS; SUBCUTANEOUS at 11:58

## 2024-08-17 RX ADMIN — PROPRANOLOL HYDROCHLORIDE 10 MG: 10 TABLET ORAL at 08:44

## 2024-08-17 ASSESSMENT — ACTIVITIES OF DAILY LIVING (ADL)
ADLS_ACUITY_SCORE: 23
DEPENDENT_IADLS:: INDEPENDENT
ADLS_ACUITY_SCORE: 23

## 2024-08-17 ASSESSMENT — VISUAL ACUITY
OU: NORMAL ACUITY
OU: NORMAL ACUITY

## 2024-08-17 NOTE — PROGRESS NOTES
Neuro: A&O x4. R sided facial numbness at baseline. SBA. HA complaint - oxy, robaxin, tylenol. Anxious at times. EVD removed by NSG, pt discharge orders received a few hrs after.   CV: HR 70-80s. SBP goal <140. Afebrile.   Pulm: RA. Clear lungs.  GI: Regular diet, good appeitite.  : Voiding spontaneously.   IV/Gtt: PIV.      Pt discharged home with sister and  (German) picking her up in private vehicle. All education provided and questions answered. All belongings sent with patient.      Daily Irwin RN

## 2024-08-17 NOTE — CONSULTS
Care Management Initial Consult    General Information  Assessment completed with: Patient (sister)Lisa  Type of CM/SW Visit: Initial Assessment    Primary Care Provider verified and updated as needed: Yes   Readmission within the last 30 days: no previous admission in last 30 days      Reason for Consult: discharge planning, emotional/coping/adjustment concerns  Advance Care Planning: Advance Care Planning Reviewed:  (No HCD on file-provided education and not interested in completing)          Communication Assessment  Patient's communication style: spoken language (English or Bilingual)    Hearing Difficulty or Deaf: no   Wear Glasses or Blind: no    Cognitive  Cognitive/Neuro/Behavioral: WDL  Level of Consciousness: alert  Arousal Level: opens eyes spontaneously  Orientation: oriented x 4  Mood/Behavior: calm, cooperative  Best Language: 0 - No aphasia  Speech: clear, spontaneous, logical    Living Environment:   People in home: child(shyanne), dependent, spouse  German  Current living Arrangements: house      Able to return to prior arrangements: yes  Living Arrangement Comments: no concerns    Family/Social Support:  Care provided by: self  Provides care for: child(shyanne)  Marital Status:   Sibling(s),   German       Description of Support System: Involved (pt reports marital stress)    Support Assessment: Adequate family and caregiver support, Adequate social supports    Current Resources:   Patient receiving home care services: No     Community Resources: None  Equipment currently used at home: none  Supplies currently used at home: None    Employment/Financial:  Employment Status: employed full-time        Financial Concerns: none   Referral to Financial Worker: No       Does the patient's insurance plan have a 3 day qualifying hospital stay waiver?  No    Lifestyle & Psychosocial Needs:  Social Determinants of Health     Food Insecurity: No Food Insecurity (1/5/2024)    [facility]    Food Insecurity     Worried About Running Out of Food in the Last Year: 1   Depression: Not at risk (8/1/2024)    PHQ-2     PHQ-2 Score: 0   Housing Stability: Low Risk  (1/5/2024)    [facility]    Housing Stability     Unable to Pay for Housing in the Last Year: 1   Tobacco Use: Medium Risk (8/15/2024)    Patient History     Smoking Tobacco Use: Former     Smokeless Tobacco Use: Never     Passive Exposure: Past   Financial Resource Strain: Low Risk  (1/5/2024)    Received from [employer] [facility], [facility]    Financial Resource Strain     Difficulty of Paying Living Expenses: 3     Difficulty of Paying Living Expenses: Not on file   Alcohol Use: Not on file   Transportation Needs: No Transportation Needs (1/5/2024)    [facility]    Transportation Needs     Lack of Transportation (Medical): 1   Physical Activity: Not on file   Interpersonal Safety: Not on file   Stress: Not on file   Social Connections: Socially Isolated (1/5/2024)    Received from [employer] [facility], [facility]    Social Connections     Frequency of Communication with Friends and Family: 4   Health Literacy: Not on file       Functional Status:  Prior to admission patient needed assistance:   Dependent ADLs:: Independent, Ambulation-no assistive device  Dependent IADLs:: Independent  Assesssment of Functional Status: At functional baseline    Mental Health Status:  Mental Health Status: No Current Concerns       Chemical Dependency Status:  Chemical Dependency Status: No Current Concerns             Values/Beliefs:  Spiritual, Cultural Beliefs, Holiness Practices,  "Values that affect care:  (unknown)       Cultural/Hinduism Practices Patient Routinely Participates In:  (unknown)  Values/Beliefs Comment: unknown    Additional Information:  Social Work consult received from nursing \"Just want to make sure pt feels supported during her hospital stay & knows any resources available to her. Sounds like she is going through a divourse in per personal life, seems very upset and anxious about it. And make sure she has any resources needed\". Reviewed chart and met w/ pt, with sister, at bedside; pt requested sister stay in room after briefly telling pt what writer was consulted for. Pt admitted 8/15 for right frontal ventriculostomy placement and ICP monitoring.     Pt reports she and her  are experiencing stress in their marriage and they are participating in weekly marital counseling. Pt reports there is no physical abuse in their relationship and feels safe returning home and  providing some caregiver support. While pt has been hospitalized  has been caring for their 6yo child and she has had no concerns with this. Pt reports she is not in individual counseling and declines resources citing she has so many other medical appts she just can't fit in individual counseling. Pt's sister appears supportive and well aware of pt's marital stress. Reviewed emergency contacts and pt wants  to remain as her emergency contact as well as her sister.     Pt reports she anticipates discharge home today and  will provide transportation home. PTA, pt was independent w/ ADLs, IADLs, ambulation, driving and caring for her child. Pt has been cleared to return home by therapies and pt reports  will be available to assist as needed.       Maye العلي, [facility] Community HospitalSW   8/17/2024       Social Work and Care Management Department       SEARCHABLE in [facility] - search SOCIAL WORK       [place] ([phone]) Saturday and Sunday     Units: 4A Vocera, 4C " Vocera, & 4E Vocera        Units: 5A [id number] Vocera, 5A [id number] Vocera , BMT SW 1 BMT SW 2, BMT SW 3 & BMT SW 4  5C Off Service [id number] - [phone]  5C Off Service [id number]     Units: 6A Vocera & 6B Vocera      Units: 6C Vocera     Units: 7A Vocera & 7B Vocera      Units: 7C Med Surg 7401 thru 7418 and 7C Med Surg 7502 thru 7521      Unit: [place] ED Vocera & [place] Obs Vocera     [facility] ([phone]) Saturday and Sunday      Units: 5 Ortho Vocera, 5 Med Surg Vocera & WB ED Vocera     Units: 6 Med Surg Vocera, 8 Med Surg Vocera, & 10 ICU Vocera      After hours Vocera [facility] and After Hours Vocera [place]     Saturday & Sunday (1630 [phone] 0000)    Mon-Fri ([phone])     [facility] Recognized Holidays  ([phone])    Units: ALL   - see above [facility] links to units and after hours

## 2024-08-17 NOTE — PLAN OF CARE
Major Shift Events:      Neuro: EVD @ 10cm above EAM and clamped, ICPs 5-14. ICP will increase above 20 when having severe pain. A&Ox4 and follows commands. Equal strength in all extremities. PERRLA. Some numbness/tingling on right side of head/face. PRN meds given for pain.   CV: Sys goal < 140. Sinus rhythm with HR in 70s. Afebrile. Strong pulses.  Resp: On room air with sats > 95%. LS clear. No SOB.   GI/: Regular diet with good appetite. Voids spontaneously in toilet. No BM overnight. Replaced Mg & K this AM.  IV: New right 22G inserted. Old left 20G infiltrated and removed.   Mobility: Stand-by assist    Plan: Continue monitoring neuro status. May plan on EVD removal later today? Notify team with any changes/updates.    For vital signs and complete assessments, please see documentation flowsheets.                Problem: Adult Inpatient Plan of Care  Goal: Absence of Hospital-Acquired Illness or Injury  Intervention: Identify and Manage Fall Risk  Recent Flowsheet Documentation  Taken 8/17/2024 0400 by Kae Hanson, RN  Safety Promotion/Fall Prevention:   activity supervised   assistive device/personal items within reach   clutter free environment maintained   lighting adjusted   nonskid shoes/slippers when out of bed   patient and family education   room near nurse's station   room organization consistent   safety round/check completed   supervised activity   room door open  Taken 8/17/2024 0000 by Kae Hanson, RN  Safety Promotion/Fall Prevention:   activity supervised   assistive device/personal items within reach   clutter free environment maintained   lighting adjusted   nonskid shoes/slippers when out of bed   patient and family education   room near nurse's station   room organization consistent   safety round/check completed   supervised activity   room door open  Taken 8/16/2024 2000 by Kae Hanson, RN  Safety Promotion/Fall Prevention:   activity supervised   assistive device/personal items  within reach   clutter free environment maintained   lighting adjusted   nonskid shoes/slippers when out of bed   patient and family education   room near nurse's station   room organization consistent   safety round/check completed   supervised activity   room door open  Intervention: Prevent Skin Injury  Recent Flowsheet Documentation  Taken 8/17/2024 0400 by Kae Hanson RN  Body Position: position changed independently  Skin Protection:   transparent dressing maintained   skin to skin areas padded   skin to device areas padded   silicone foam dressing in place   pulse oximeter probe site changed  Device Skin Pressure Protection:   tubing/devices free from skin contact   skin-to-skin areas padded   skin-to-device areas padded   pressure points protected   positioning supports utilized  Taken 8/17/2024 0200 by Kae Hanson RN  Body Position: position changed independently  Taken 8/17/2024 0000 by Kae Hanson RN  Body Position: position changed independently  Skin Protection:   transparent dressing maintained   skin to skin areas padded   skin to device areas padded   silicone foam dressing in place   pulse oximeter probe site changed  Device Skin Pressure Protection:   tubing/devices free from skin contact   skin-to-skin areas padded   skin-to-device areas padded   pressure points protected   positioning supports utilized  Taken 8/16/2024 2200 by Kae Hanson RN  Body Position: position changed independently  Taken 8/16/2024 2000 by Kae Hanson RN  Body Position: position changed independently  Skin Protection:   transparent dressing maintained   skin to skin areas padded   skin to device areas padded   silicone foam dressing in place   pulse oximeter probe site changed  Device Skin Pressure Protection:   tubing/devices free from skin contact   skin-to-skin areas padded   skin-to-device areas padded   pressure points protected   positioning supports utilized  Intervention: Prevent and Manage VTE  (Venous Thromboembolism) Risk  Recent Flowsheet Documentation  Taken 8/17/2024 0400 by Kae Hanson RN  VTE Prevention/Management: SCDs on (sequential compression devices)  Taken 8/17/2024 0000 by Kae Hanson RN  VTE Prevention/Management: SCDs on (sequential compression devices)  Taken 8/16/2024 2000 by Kae Hanson RN  VTE Prevention/Management: SCDs on (sequential compression devices)  Intervention: Prevent Infection  Recent Flowsheet Documentation  Taken 8/17/2024 0400 by Kae Hanson RN  Infection Prevention:   hand hygiene promoted   environmental surveillance performed   equipment surfaces disinfected   personal protective equipment utilized   rest/sleep promoted   single patient room provided   visitors restricted/screened  Taken 8/17/2024 0000 by Kae Hanson RN  Infection Prevention:   hand hygiene promoted   environmental surveillance performed   equipment surfaces disinfected   personal protective equipment utilized   rest/sleep promoted   single patient room provided   visitors restricted/screened  Taken 8/16/2024 2000 by Kae Hanson RN  Infection Prevention:   hand hygiene promoted   environmental surveillance performed   equipment surfaces disinfected   personal protective equipment utilized   rest/sleep promoted   single patient room provided   visitors restricted/screened  Goal: Optimal Comfort and Wellbeing  Intervention: Monitor Pain and Promote Comfort  Recent Flowsheet Documentation  Taken 8/17/2024 0400 by Kae Hanson RN  Pain Management Interventions: medication (see MAR)  Taken 8/17/2024 0315 by Kae Hanson RN  Pain Management Interventions: medication offered but refused  Taken 8/17/2024 0230 by Kae Hanson RN  Pain Management Interventions: medication (see MAR)  Taken 8/17/2024 0000 by Kae Hanson RN  Pain Management Interventions:   medication (see MAR)   care clustered   emotional support   pain management plan reviewed with patient/caregiver   pillow  support provided   relaxation techniques promoted   repositioned   rest  Intervention: Provide Person-Centered Care  Recent Flowsheet Documentation  Taken 8/17/2024 0400 by Kae Hanson RN  Trust Relationship/Rapport:   care explained   choices provided   questions answered   questions encouraged   reassurance provided   thoughts/feelings acknowledged   emotional support provided   empathic listening provided  Taken 8/17/2024 0000 by Kae Hanson RN  Trust Relationship/Rapport:   care explained   choices provided   questions answered   questions encouraged   reassurance provided   thoughts/feelings acknowledged   emotional support provided   empathic listening provided  Taken 8/16/2024 2000 by Kae Hanson RN  Trust Relationship/Rapport:   care explained   choices provided   questions answered   questions encouraged   reassurance provided   thoughts/feelings acknowledged   emotional support provided   empathic listening provided     Problem: Anxiety Signs/Symptoms  Goal: Improved Mood Symptoms (Anxiety Signs/Symptoms)  Intervention: Optimize Emotion and Mood  Recent Flowsheet Documentation  Taken 8/17/2024 0400 by Kae Hanson RN  Supportive Measures:   active listening utilized   relaxation techniques promoted   verbalization of feelings encouraged   decision-making supported   positive reinforcement provided   self-care encouraged  Taken 8/17/2024 0000 by Kae Hanson RN  Supportive Measures:   active listening utilized   relaxation techniques promoted   verbalization of feelings encouraged   decision-making supported   positive reinforcement provided   self-care encouraged  Taken 8/16/2024 2000 by Kae Hanson RN  Supportive Measures:   active listening utilized   relaxation techniques promoted   verbalization of feelings encouraged   decision-making supported   positive reinforcement provided   self-care encouraged  Goal: Enhanced Social, Occupational or Functional Skills (Anxiety  Signs/Symptoms)  Intervention: Promote Social, Occupational and Functional Ability  Recent Flowsheet Documentation  Taken 8/17/2024 0400 by Kae Hanson RN  Social Functional Ability Promotion: autonomy promoted  Trust Relationship/Rapport:   care explained   choices provided   questions answered   questions encouraged   reassurance provided   thoughts/feelings acknowledged   emotional support provided   empathic listening provided  Taken 8/17/2024 0000 by Kae Hanson RN  Social Functional Ability Promotion: autonomy promoted  Trust Relationship/Rapport:   care explained   choices provided   questions answered   questions encouraged   reassurance provided   thoughts/feelings acknowledged   emotional support provided   empathic listening provided  Taken 8/16/2024 2000 by Kae Hanson RN  Social Functional Ability Promotion: autonomy promoted  Trust Relationship/Rapport:   care explained   choices provided   questions answered   questions encouraged   reassurance provided   thoughts/feelings acknowledged   emotional support provided   empathic listening provided     Problem: Pain Acute  Goal: Optimal Pain Control and Function  Intervention: Develop Pain Management Plan  Recent Flowsheet Documentation  Taken 8/17/2024 0400 by Kae Hanson RN  Pain Management Interventions: medication (see MAR)  Taken 8/17/2024 0315 by Kae Hanson RN  Pain Management Interventions: medication offered but refused  Taken 8/17/2024 0230 by Kae Hanson RN  Pain Management Interventions: medication (see MAR)  Taken 8/17/2024 0000 by Kae Hanson RN  Pain Management Interventions:   medication (see MAR)   care clustered   emotional support   pain management plan reviewed with patient/caregiver   pillow support provided   relaxation techniques promoted   repositioned   rest  Intervention: Prevent or Manage Pain  Recent Flowsheet Documentation  Taken 8/17/2024 0400 by Kae Hanson RN  Sensory Stimulation Regulation:    care clustered   lighting decreased   quiet environment promoted   television on  Bowel Elimination Promotion:   privacy promoted   adequate fluid intake promoted  Medication Review/Management: medications reviewed  Taken 8/17/2024 0000 by Kae Hanson RN  Sensory Stimulation Regulation:   care clustered   lighting decreased   quiet environment promoted   television on  Bowel Elimination Promotion:   privacy promoted   adequate fluid intake promoted  Medication Review/Management: medications reviewed  Taken 8/16/2024 2000 by Kae Hanson RN  Sensory Stimulation Regulation:   care clustered   lighting decreased   quiet environment promoted   television on  Bowel Elimination Promotion:   privacy promoted   adequate fluid intake promoted  Medication Review/Management: medications reviewed  Intervention: Optimize Psychosocial Wellbeing  Recent Flowsheet Documentation  Taken 8/17/2024 0400 by Kae Hanson RN  Supportive Measures:   active listening utilized   relaxation techniques promoted   verbalization of feelings encouraged   decision-making supported   positive reinforcement provided   self-care encouraged  Taken 8/17/2024 0000 by Kae Hanson RN  Supportive Measures:   active listening utilized   relaxation techniques promoted   verbalization of feelings encouraged   decision-making supported   positive reinforcement provided   self-care encouraged  Taken 8/16/2024 2000 by Kae Hanson RN  Supportive Measures:   active listening utilized   relaxation techniques promoted   verbalization of feelings encouraged   decision-making supported   positive reinforcement provided   self-care encouraged     Problem: Comorbidity Management  Goal: Maintenance of Behavioral Health Symptom Control  Intervention: Maintain Behavioral Health Symptom Control  Recent Flowsheet Documentation  Taken 8/17/2024 0400 by Kae Hanson RN  Medication Review/Management: medications reviewed  Taken 8/17/2024 0000 by Margie  ANUPAMA Pal  Medication Review/Management: medications reviewed  Taken 8/16/2024 2000 by Kae Hanson, RN  Medication Review/Management: medications reviewed  Goal: Blood Pressure in Desired Range  Intervention: Maintain Blood Pressure Management  Recent Flowsheet Documentation  Taken 8/17/2024 0400 by Kae Hanson RN  Medication Review/Management: medications reviewed  Taken 8/17/2024 0000 by Kae Hanson, RN  Medication Review/Management: medications reviewed  Taken 8/16/2024 2000 by Kae Hanson, RN  Medication Review/Management: medications reviewed   Goal Outcome Evaluation:

## 2024-08-17 NOTE — PROGRESS NOTES
[facility], [place]   08/17/2024  Neurosurgery Progress Note:    Interval History:   No acute events overnight  Patient reports having improved headaches overnight  ICPs <14    Assessment:  Paula Back is a 34 year old female who presented with a history of chiari malformation and headaches, who is now:    POD 2 from right frontal ventriculostomy placement    Plan:  Discussion with staff daily regarding EVD removal  EVD clamped  Serial neuro exams  Pain control  HOB > 30 degrees  Advance diet as tolerated  Bowel regimen  PRN antiemetics  IVF until taking adequate PO  PT/OT  SCDs for DVT proph  Possible discharge post-EVD removal, will discuss with staff.    Discussed with staff: Dr. Schwartz    -----------------------------------  Mike Valadez MD  PGY-2 Department of Neurosurgery  [facility]  Pager: [phone]  On-call: [phone]  Please page/[facility] on-call neurosurgery with questions  -----------------------------------    Objective:   Temp:  [97.6  F (36.4  C)-98.5  F (36.9  C)] 98.3  F (36.8  C)  Pulse:  [] 87  Resp:  [12-20] 16  BP: ()/(47-81) 107/55  SpO2:  [94 %-100 %] 95 %  I/O last 3 completed shifts:  In: 2990 [P.O.:2840; I.V.:150]  Out: 0     Gen: Appears comfortable, NAD  Incision: clean, dry, intact  Neurologic:  Alert & Oriented to person, place, time, and situation  Follows commands briskly  Speech fluent, spontaneous. No aphasia or dysarthria.  No gaze preference. No apparent hemineglect.  PERRL, EOMI  Face symmetric with sensation intact to light touch  Palate elevates symmetrically, uvula midline, tongue protrudes midline  Trapezii muscles 5/5 bilaterally  No pronator drift     Del Tr Bi WE WF Gr   R 5 5 5 5 5 5   L 5 5 5 5 5 5    HF KE KF DF PF EHL   R 5 5 5 5 5 5   L 5 5 5 5 5 5     Reflexes 2+ throughout  Sensation intact and symmetric to light touch throughout    LABS:  Recent Labs   Lab 08/16/24  0509 08/15/24  9456  08/15/24  0729 08/15/24  0629     --   --  142   POTASSIUM 3.9  --   --  4.1   CHLORIDE 105  --   --  110*   CO2 25  --   --  21*   ANIONGAP 7  --   --  11   * 128* 98 104*   BUN 5.7*  --   --  9.6   CR 0.73  --   --  0.86   HAM 8.6*  --   --  8.9     Recent Labs   Lab 08/16/24  0509   WBC 7.3   RBC 3.62*   HGB 10.8*   HCT 32.7*   MCV 90   MCH 29.8   MCHC 33.0   RDW 12.7          IMAGING:  No results found for this or any previous visit (from the past 24 hour(s)).    I have seen this patient with the resident and formulated a plan and agree with this note.  AMP

## 2024-08-17 NOTE — PROGRESS NOTES
Neurosurgery Brief Progress Note    EVD removal    Drain not deemed to be necessary. Drain site was prepped in usual sterile fashion with chloraprep. The drain was taken off suction. The sutures securing the drain were cut and the site was re-prepped. The drain was removed with tip intact. A single figure of 8 stitch with 3-0 monocryl was placed with adequate hemostasis. No immediate complication was observed and the patient tolerated the procedure well.     Anam Krishnamurthy MD  Neurosurgery Resident  Pager [phone]    Please contact neurosurgery resident on call with questions.    Dial * * [phone], enter [phone] when prompted.

## 2024-08-17 NOTE — DISCHARGE SUMMARY
"[facility] Discharge Summary and Instructions    Paula Back MRN# [id number]   Age: 34 year old YOB: 1990     Date of Admission:  8/15/2024  Date of Discharge::  8/17/2024  Admitting Physician:  Micheal Olivares MD  Discharge Physician:  Micheal Oliavres MD          Admission Diagnoses:   Chiari malformation type I (H) [G93.5]          Discharge Diagnosis:     Chiari malformation type I (H) [G93.5]     Clinically Significant Risk Factors Present on Admission            # Severe Obesity: Estimated body mass index is 43.87 kg/m  as calculated from the following:    Height as of this encounter: 1.575 m (5' 2\").    Weight as of this encounter: 108.8 kg (239 lb 13.8 oz).           Procedures:   Placement of external ventricular drain for ICP monitoring           Brief History of Illness:   34-year-old who presents with headaches and unclear history of possible intracranial hypertension.  She has not clearly had documented high intracranial pressure on prior studies.  She has previously been treated with Diamox but has not been taking this medication.  Due to the unclear nature of her intracranial pressure, we discussed options that include lumbar puncture and direct intracranial pressure monitoring.  Patient has elected to undergo above-mentioned procedure.           Hospital Course:   Patient underwent above-mentioned procedure on 8/17/2024. The operation was uncomplicated and she was admitted to the neuro ICU for routine post operative cares. Her ICPs during the entire hospital stays were low <13, and did not correlate with headaches. Her EVD remained clamped- throughout the hospital course. We have established that she does not have high intracranial pressures. So the EVD as removed and she was discharged home.          Discharge Medications:     Current Discharge Medication List        START taking these medications    Details   oxyCODONE (ROXICODONE) 5 MG tablet Take 1 " tablet (5 mg) by mouth every 4 hours as needed for moderate pain  Qty: 10 tablet, Refills: 0    Associated Diagnoses: Chiari I malformation (H)      senna-docusate (SENOKOT-S/PERICOLACE) 8.6-50 MG tablet Take 1 tablet by mouth 2 times daily  Qty: 20 tablet, Refills: 0    Associated Diagnoses: Chiari I malformation (H)           CONTINUE these medications which have CHANGED    Details   ibuprofen (ADVIL/MOTRIN) 200 MG tablet Take 2 tablets (400 mg) by mouth every 8 hours as needed for pain  Qty: 20 tablet, Refills: 0    Associated Diagnoses: Chiari I malformation (H)           CONTINUE these medications which have NOT CHANGED    Details   acetaminophen (TYLENOL) 325 MG tablet Take 650 mg by mouth every 4 hours as needed for pain      amphetamine-dextroamphetamine (ADDERALL XR) 30 MG 24 hr capsule Take 30 mg by mouth every morning      amphetamine-dextroamphetamine (ADDERALL) 5 MG tablet Take 5 mg by mouth daily as needed (Take in the afternoon if needed)      clindamycin (CLINDAMAX) 1 % external gel Apply topically 2 times daily as needed (acne)      DULoxetine (CYMBALTA) 30 MG capsule Take 1 capsule by mouth every evening      liraglutide (VICTOZA) 18 MG/3ML solution Inject 1.2 mg subcutaneously daily      LORazepam (ATIVAN) 0.5 MG tablet Take 1 tablet by mouth daily as needed      ondansetron (ZOFRAN) 8 MG tablet Take 8 mg by mouth every 8 hours as needed for nausea      propranolol (INDERAL) 10 MG tablet Take 1 tablet by mouth every morning      topiramate (TOPAMAX) 25 MG tablet Start with 25 mg once per day at nightThen after 2 weeks could go to twice daily.  Then after 2 weeks can go to 50 mg twice per day then wait at this dose for a bit to have its effect.  Stopping along the way if any increased side effects or problems  Qty: 120 tablet, Refills: 5    Comments: Patient to slowly titrate dose up.  Associated Diagnoses: Pseudotumor cerebri syndrome      traMADol (ULTRAM) 50 MG tablet Take 100 mg by mouth daily       traZODone (DESYREL) 50 MG tablet Take 50 mg by mouth at bedtime      vilazodone (VIIBRYD) 20 MG TABS tablet Take 20 mg by mouth every morning           STOP taking these medications       amphetamine-dextroamphetamine (ADDERALL XR) 5 MG 24 hr capsule Comments:   Reason for Stopping:                       Discharge Instructions and Follow-Up:     Discharge diet: Regular   Discharge activity: You may advance activity as tolerated. No strenuous exercise or heay lifting greater than 10 lbs for 4 weeks or until seen and cleared in clinic.   Discharge follow-up: Follow-up with Neurosurgery [facility] in 2 weeks for wound check/post-hospital evaluation    Follow up with headache specialist- arranged for you     Wound care: The wound is closed with monocryl. The sutures are absorbable and DO NOT need to be removed. The wound can be left undressed and open to air. OK to take showers and get the wound wet starting on post-operative day #3 but do not not scrub or soak the wound or keep it submerged under water. If the wound getswet, be sure to pat dry it rather than scrubbing it with a towel.    Post-op care at follow-up: Keep dry and clean     Please call if you have:  1. increased pain, redness, drainage, swelling at your incision  2. fevers > 101.5 F degrees  3. with any questions or concerns.  You may reach the Neurosurgery clinic at [phone] during regular work hours. ER at [phone].    and ask for the Neurosurgery Resident on call at [phone], during off hours or weekends.         Discharge Disposition:     Discharged to home          Anam Krishnamurthy MD  Neurosurgery Resident  Pager [phone]

## 2024-08-17 NOTE — PLAN OF CARE
Problem: Adult Inpatient Plan of Care  Goal: Plan of Care Review  Description: The Plan of Care Review/Shift note should be completed every shift.  The Outcome Evaluation is a brief statement about your assessment that the patient is improving, declining, or no change.  This information will be displayed automatically on your shift  note.  Flowsheets (Taken 8/17/2024 5698)  Outcome Evaluation: Psychosocial Assessment completed today. Also addressed nursing consult for potential need for added support due to stress in marriage.  Plan of Care Reviewed With:   patient   sibling  Overall Patient Progress: improving    WES Germain, LICSW   4A/4E ICU   Phone: [phone]  Available via Skype

## 2024-08-19 NOTE — PLAN OF CARE
Occupational Therapy Discharge Summary    Reason for therapy discharge:    Discharged to home.    Progress towards therapy goal(s). See goals on Care Plan in [facility] electronic health record for goal details.  Goals partially met.  Barriers to achieving goals:   discharge from facility.    Therapy recommendation(s):    No further therapy is recommended.  Recommend assist as needed upon return home.

## 2024-08-20 ENCOUNTER — TELEPHONE (OUTPATIENT)
Dept: NEUROSURGERY | Facility: CLINIC | Age: 34
End: 2024-08-20
Payer: COMMERCIAL

## 2024-08-20 NOTE — TELEPHONE ENCOUNTER
Left pt a detailed message with 2 week wound check appt with Leia Miller on September 4th. She was instructed to call back if appt date and time will not work.

## 2024-08-23 ENCOUNTER — TELEPHONE (OUTPATIENT)
Dept: NEUROLOGY | Facility: CLINIC | Age: 34
End: 2024-08-23

## 2024-08-23 DIAGNOSIS — G93.5 CHIARI I MALFORMATION (H): Primary | ICD-10-CM

## 2024-08-23 RX ORDER — OXYCODONE HYDROCHLORIDE 5 MG/1
5 TABLET ORAL EVERY 6 HOURS PRN
Qty: 6 TABLET | Refills: 0 | Status: CANCELLED | OUTPATIENT
Start: 2024-08-23 | End: 2024-08-26

## 2024-08-24 DIAGNOSIS — G93.5 CHIARI I MALFORMATION (H): ICD-10-CM

## 2024-08-24 RX ORDER — OXYCODONE HYDROCHLORIDE 5 MG/1
5 TABLET ORAL EVERY 6 HOURS PRN
Qty: 5 TABLET | Refills: 0 | Status: SHIPPED | OUTPATIENT
Start: 2024-08-24

## 2024-09-04 ENCOUNTER — OFFICE VISIT (OUTPATIENT)
Dept: NEUROSURGERY | Facility: CLINIC | Age: 34
End: 2024-09-04
Payer: COMMERCIAL

## 2024-09-04 VITALS
RESPIRATION RATE: 16 BRPM | OXYGEN SATURATION: 100 % | HEART RATE: 89 BPM | DIASTOLIC BLOOD PRESSURE: 68 MMHG | SYSTOLIC BLOOD PRESSURE: 95 MMHG

## 2024-09-04 DIAGNOSIS — R51.9 CHRONIC DAILY HEADACHE: ICD-10-CM

## 2024-09-04 DIAGNOSIS — G93.5 CHIARI I MALFORMATION (H): Primary | ICD-10-CM

## 2024-09-04 PROCEDURE — 99024 POSTOP FOLLOW-UP VISIT: CPT | Performed by: PHYSICIAN ASSISTANT

## 2024-09-04 ASSESSMENT — PAIN SCALES - GENERAL: PAINLEVEL: MODERATE PAIN (5)

## 2024-09-04 NOTE — PROGRESS NOTES
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 She underwent placement of EVD for ICP monitoring recently to assess for  shunt placement. Her headaches did not improve in hospital with the drain, and her ICP's remained low, <13. She plans to follow up with Neurology for headache consultation and is specifically requesting to see Dr. Barber. I told her I could place this referral but that Dr. Barber's department will ultimately decide if that provider is appropriate for her consult. She is planning to seek a second Neurosurgical opinion as well.     She has no wound concerns today. She does report a day or two of minimal clear drainage from her wound, this stopped spontaneously and has not recurred, was 1 week ago. No increased headaches.      Review of Systems:   Pertinent items are noted in HPI or as in patient entered ROS below, remainder of complete ROS is negative.     Physical Exam:   BP 95/68 (BP Location: Right arm, Patient Position: Sitting)   Pulse 89   Resp 16   LMP 2024 (Approximate)   SpO2 100%    General: No acute distress.    Eyes: Conjunctivae are normal.  MSK: Moves all extremities.  No obvious deformity.  Neuro: The patient is fully oriented. Speech is normal. Facial nerve function is normal, rated as a House Brackmann 1. Gait is normal.   Psych: Normal mood and affect. Behavior is normal.    Incision: Her drain incision is clean, dry, and well healed with monocryl sutures in place.     Assessment:  Chiari malformation type I  s/p placement of external ventricular drain for ICP monitoring, postoperative wound check    Plan:  Follow up with Neurosurgery as discussed with Dr. Olivares and team, or as needed. I told her that if her decision is to not return to see us here at [facility], she should have someone following her at least intermittently for her Chiari.        Leia Miller PA-C  Department of Neurosurgery

## 2024-09-11 ENCOUNTER — TELEPHONE (OUTPATIENT)
Dept: NEUROLOGY | Facility: CLINIC | Age: 34
End: 2024-09-11
Payer: COMMERCIAL

## 2024-09-11 NOTE — TELEPHONE ENCOUNTER
Left Voicemail (1st Attempt) and Sent Mychart (1st Attempt) for the patient to call back and schedule the following:    Appointment type: New headache  Provider: Any, Any location  Return date: First avail  Specialty phone number: [phone]  Additional appointment(s) needed: NA  Additonal Notes: Pt's referral reviewed by Dr Barber, who recommends seeing headache specialty not herself.    Alison Escobar on 9/11/2024 at 2:39 PM

## 2024-09-13 NOTE — TELEPHONE ENCOUNTER
2nd attempt VM full for the patient to call back and schedule the following:    Appointment type: New headache  Provider: Any, Any location  Return date: First avail  Specialty phone number: [phone]  Additional appointment(s) needed: NA  Additonal Notes: Pt's referral reviewed by Dr Barber, who recommends seeing headache specialty not herself.      Marzena Cade on 9/13/2024 at 11:38 AM
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
## 2024-10-14 ENCOUNTER — OFFICE VISIT (OUTPATIENT)
Dept: NEUROLOGY | Facility: CLINIC | Age: 34
End: 2024-10-14
Payer: COMMERCIAL

## 2024-10-14 VITALS
SYSTOLIC BLOOD PRESSURE: 118 MMHG | HEART RATE: 90 BPM | DIASTOLIC BLOOD PRESSURE: 82 MMHG | HEIGHT: 62 IN | BODY MASS INDEX: 44.9 KG/M2 | WEIGHT: 244 LBS

## 2024-10-14 DIAGNOSIS — G43.E19 INTRACTABLE CHRONIC MIGRAINE WITH AURA AND WITHOUT STATUS MIGRAINOSUS: Primary | ICD-10-CM

## 2024-10-14 DIAGNOSIS — G93.5 CHIARI I MALFORMATION (H): ICD-10-CM

## 2024-10-14 DIAGNOSIS — G93.2 PSEUDOTUMOR CEREBRI SYNDROME: ICD-10-CM

## 2024-10-14 DIAGNOSIS — M24.20 EAGLE SYNDROME: ICD-10-CM

## 2024-10-14 PROCEDURE — G2211 COMPLEX E/M VISIT ADD ON: HCPCS | Performed by: PSYCHIATRY & NEUROLOGY

## 2024-10-14 PROCEDURE — 99215 OFFICE O/P EST HI 40 MIN: CPT | Performed by: PSYCHIATRY & NEUROLOGY

## 2024-10-14 RX ORDER — TOPIRAMATE 25 MG/1
50 TABLET, FILM COATED ORAL AT BEDTIME
Qty: 180 TABLET | Refills: 3 | Status: SHIPPED | OUTPATIENT
Start: 2024-10-14
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
## 2024-10-14 NOTE — NURSING NOTE
Chief Complaint   Patient presents with    Pseudotumor cerebri syndrome     6 month follow up. Pt states she is still having some gum sloughing in the back, not sure if related to topamax, not as bad as it was with the diamox.       Dottie Benson, LPN on 10/14/2024 at 3:31 PM

## 2025-01-14 DIAGNOSIS — I87.1 COMPRESSION OF VEIN: Primary | ICD-10-CM

## 2025-01-14 DIAGNOSIS — G93.2 INTRACRANIAL PRESSURE INCREASED: ICD-10-CM

## 2025-01-14 DIAGNOSIS — R42 VERTIGO: ICD-10-CM

## 2025-01-14 DIAGNOSIS — M54.2 NECK PAIN: ICD-10-CM

## 2025-01-14 DIAGNOSIS — G54.0 TOS (THORACIC OUTLET SYNDROME): ICD-10-CM

## 2025-01-15 NOTE — TELEPHONE ENCOUNTER
Records Requested     January 15, 2025 4:05 PM   [id number]   Facility  [facility]   Outcome 4:08 pm Sent request for imaging to be pushed to PACS. -JA     RECORDS RECEIVED FROM: Care Everywhere   REASON FOR VISIT: TOS (thoracic outlet syndrome)   PROVIDER: Alejandro Barber MD   DATE OF APPT: 2/12/25 @ 4:30 pm    NOTES (FOR ALL VISITS) STATUS DETAILS   OFFICE NOTE from referring provider Internal 1/14/25 (Phone Notes)     OFFICE NOTE from other specialist Care Everywhere 10/14/24 [facility]    9/4/24 [facility]    6/19/24 Eddie Quintanilla MD  @[facility]    4/12/24 Maxime Villaseñor PA-C  @[facility]     DISCHARGE SUMMARY from hospital Internal  8/15/24-8/17/24 Micheal Olivares MD @[facility]     OPERATIVE REPORT Internal 8/15/24 Micheal Olivares MD @[facility] VENTRICULOSTOMY, CEREBRAL VENTRICLE, USING OPTICAL TRACKING SYSTEM      MEDICATION LIST Internal    IMAGING  (FOR ALL VISITS)     X-RAY In process [facility]*   3/4/24 XR Clavicle     MRI (HEAD, NECK, SPINE) Internal [facility]  4/24/23 MR Thoracic Spine  4/24/23 MR Cervical Spine     CT (HEAD, NECK, SPINE) Internal [facility]  8/15/24 CT Head  8/15/24 CT Head  3/20/24 CT Head  5/23/23 CTV Head Neck    [facility]  12/14/23 CT Soft Tissue Neck

## 2025-02-05 ENCOUNTER — TELEPHONE (OUTPATIENT)
Dept: NEUROLOGY | Facility: CLINIC | Age: 35
End: 2025-02-05
Payer: COMMERCIAL

## 2025-02-05 NOTE — TELEPHONE ENCOUNTER
[facility] Call Center    Phone Message    May a detailed message be left on voicemail: yes     Reason for Call:  Patient called states that she saw her ENT provider Dr. De yesterday and he is asking if  will add soft tissue CT of head and neck? Patient is scheduled for CTV head on 2-         Action Taken: Message routed to:  [facility] neurology    Travel Screening: Not Applicable     Date of Service:

## 2025-02-05 NOTE — TELEPHONE ENCOUNTER
LVM - if provider is recommending CT, would need to be ordered by Dr. De. Would most likely have to be rescheduled if there is any modification to order.

## 2025-02-12 ENCOUNTER — ANCILLARY PROCEDURE (OUTPATIENT)
Dept: ULTRASOUND IMAGING | Facility: CLINIC | Age: 35
End: 2025-02-12
Attending: PSYCHIATRY & NEUROLOGY
Payer: COMMERCIAL

## 2025-02-12 ENCOUNTER — OFFICE VISIT (OUTPATIENT)
Dept: NEUROLOGY | Facility: CLINIC | Age: 35
End: 2025-02-12
Payer: COMMERCIAL

## 2025-02-12 ENCOUNTER — ANCILLARY PROCEDURE (OUTPATIENT)
Dept: CT IMAGING | Facility: CLINIC | Age: 35
End: 2025-02-12
Attending: PSYCHIATRY & NEUROLOGY
Payer: COMMERCIAL

## 2025-02-12 ENCOUNTER — PRE VISIT (OUTPATIENT)
Dept: NEUROLOGY | Facility: CLINIC | Age: 35
End: 2025-02-12

## 2025-02-12 VITALS
RESPIRATION RATE: 14 BRPM | BODY MASS INDEX: 43.82 KG/M2 | WEIGHT: 247.3 LBS | DIASTOLIC BLOOD PRESSURE: 76 MMHG | HEIGHT: 63 IN | HEART RATE: 100 BPM | SYSTOLIC BLOOD PRESSURE: 115 MMHG | OXYGEN SATURATION: 99 %

## 2025-02-12 DIAGNOSIS — G93.5 CHIARI I MALFORMATION (H): ICD-10-CM

## 2025-02-12 DIAGNOSIS — I87.1 COMPRESSION OF VEIN: ICD-10-CM

## 2025-02-12 DIAGNOSIS — M24.20 EAGLE SYNDROME: ICD-10-CM

## 2025-02-12 DIAGNOSIS — M25.511 CHRONIC RIGHT SHOULDER PAIN: ICD-10-CM

## 2025-02-12 DIAGNOSIS — M54.2 NECK PAIN: ICD-10-CM

## 2025-02-12 DIAGNOSIS — G43.E19 INTRACTABLE CHRONIC MIGRAINE WITH AURA AND WITHOUT STATUS MIGRAINOSUS: ICD-10-CM

## 2025-02-12 DIAGNOSIS — M77.8 TENDINITIS OF RIGHT SHOULDER: ICD-10-CM

## 2025-02-12 DIAGNOSIS — R42 VERTIGO: ICD-10-CM

## 2025-02-12 DIAGNOSIS — I87.8 VENOUS CONGESTION: ICD-10-CM

## 2025-02-12 DIAGNOSIS — G54.0 TOS (THORACIC OUTLET SYNDROME): ICD-10-CM

## 2025-02-12 DIAGNOSIS — G89.29 CHRONIC RIGHT SHOULDER PAIN: ICD-10-CM

## 2025-02-12 DIAGNOSIS — G93.2 INTRACRANIAL PRESSURE INCREASED: ICD-10-CM

## 2025-02-12 DIAGNOSIS — G24.3 CERVICAL DYSTONIA: Primary | ICD-10-CM

## 2025-02-12 PROCEDURE — 70496 CT ANGIOGRAPHY HEAD: CPT | Performed by: RADIOLOGY

## 2025-02-12 PROCEDURE — 93922 UPR/L XTREMITY ART 2 LEVELS: CPT | Performed by: RADIOLOGY

## 2025-02-12 PROCEDURE — 93970 EXTREMITY STUDY: CPT | Performed by: RADIOLOGY

## 2025-02-12 PROCEDURE — 70498 CT ANGIOGRAPHY NECK: CPT | Performed by: RADIOLOGY

## 2025-02-12 RX ORDER — DEXTROAMPHETAMINE SACCHARATE, AMPHETAMINE ASPARTATE MONOHYDRATE, DEXTROAMPHETAMINE SULFATE AND AMPHETAMINE SULFATE 2.5; 2.5; 2.5; 2.5 MG/1; MG/1; MG/1; MG/1
20 CAPSULE, EXTENDED RELEASE ORAL EVERY MORNING
COMMUNITY
Start: 2025-02-06

## 2025-02-12 RX ORDER — DEXTROAMPHETAMINE SACCHARATE, AMPHETAMINE ASPARTATE, DEXTROAMPHETAMINE SULFATE AND AMPHETAMINE SULFATE 2.5; 2.5; 2.5; 2.5 MG/1; MG/1; MG/1; MG/1
10 TABLET ORAL EVERY MORNING
COMMUNITY
Start: 2025-02-06

## 2025-02-12 RX ORDER — IOPAMIDOL 755 MG/ML
70 INJECTION, SOLUTION INTRAVASCULAR ONCE
Status: COMPLETED | OUTPATIENT
Start: 2025-02-12 | End: 2025-02-12

## 2025-02-12 RX ADMIN — IOPAMIDOL 70 ML: 755 INJECTION, SOLUTION INTRAVASCULAR at 11:41

## 2025-02-12 ASSESSMENT — PAIN SCALES - GENERAL: PAINLEVEL_OUTOF10: MODERATE PAIN (5)

## 2025-02-12 NOTE — NURSING NOTE
"Chief Complaint   Patient presents with    New Patient     /76 (BP Location: Right arm, Patient Position: Sitting, Cuff Size: Adult Large)   Pulse 100   Resp 14   Ht 1.588 m (5' 2.5\")   Wt 112.2 kg (247 lb 4.8 oz)   SpO2 99%   BMI 44.51 kg/m      MIRIAN FERNANDEZ    "

## 2025-02-12 NOTE — LETTER
2/12/2025       RE: Paula Back  [id number] [facility] [address]     Dear Colleague,    Thank you for referring your patient, Paula Back, to the [facility]UR[facility] [place] at [facility]. Please see a copy of my visit note below.    [facility]    Neurology Consult    2/12/2025      Paula Back MRN# [id number]   YOB: 1990 Age: 34 year old      Primary care provider:   Rigoberto French    Requesting provider:   Melita Miller    Reason for Consult:  Intractable headaches    IMPRESSIONS:  Paula Back is a 34 year old female with a past medical history of possible idiopathic intracranial hypertension diagnosed in 2016, 8 mm Chiari I malformation (on imaging from 2017, but diagnosed in 2023 - has not had surgery), cervical and lumbar spine degenerative changes, chronic headaches and chronic pain located around the right eye, jaw, and neck and shoulder. The right sided symptoms started after a traditional chiropractic adjustment. A prior transoral right sided styloidectomy with resection of a stylohyoid ligament was not helpful for relieving these symptoms. CTV shows that both internal jugular veins at the C1 level are open. Although the right styloid process is shorter than the left, it still extends below C1. The jugular vein has room here, however A a dynamic Doppler ultrasound revealed bilateral subclavian veins concerning for bilateral thoracic outlet syndrome. Due to symptoms present on the R, will proceed with PT and botox injections of the R cervical muscles and monitor for improvement. She has bilateral but quite right dominant pain in the thoracic outlet and pectoralis minor tendons. She has a leftward head tilt. She may have a bit of cervical dystonia is that impacting the venous structures under the muscles. We will start with getting her into the right physical  therapy (she will send information of a local therapist). 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 Given some concerns of right shoulder instability, we will get a right shoulder MRI looking for rotator cuff injury.     Recommendations:  PT with pectoralis minor and first rib mobilization on the R  Botox injections for R sided TOS  R shoulder MRI for chronic R shoulder pain and possible calcified tendinitis on prior R shoulder Xray  Follow-up for injections    HISTORY OF PRESENT ILLNESS:  34-year-old woman with a history of idiopathic intracranial hypertension diagnosed in 2016, 8 mm Chiari I malformation (on imaging from 2017, but diagnosed in 2023 - has not had surgery), intractable chronic migraine headaches with a right side predominance with pain located around the right eye, jaw, and neck.  Headaches are worse with bending over, coughing, and with lack of neck support.  Symptoms got triggered after chiropractic manipulation in February 2023.  Additional symptoms include cognitive slowing, visual blurring, palpitations, presyncope, dysphagia, imbalance and head pressure. Per her report, her right stylo-hyoid ligament was tearing through her throat, so she had a right transoral styloidectomy on 5-8-24, which unfortunately provided no relief.     Evaluations have included an MRI of the cervical spine 1-, MRA head 3-, head and neck CTA 2-, CTV head and neck 5-, retinal nerve fiber layer study 5-, EMG 10-.  These were only significant for the Chiari I malformation with normal CSF flow.  Opening pressure on LP 4- was 22 cm of water.  Hospitalization from 8-15-24 to 8-17-24 for intracranial pressure monitoring was reported to show a pressure of <13 cm of water without a correlation with her headaches. Detailed notes indicate that pressure did spike as high as 28cm H20 however.    She has tried a number  of medications including Diamox, topiramate, propranolol, and Emgality for prevention and a number of abortive treatments, none of which have been effective.  She was seen by the pain clinic and was told that they could not do anything additional for her.     She currently remains on topiramate.  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  There was concern for tissue sloughing - she says pieces of her tongue are falling off.  The diamox was also causing an acidosis.      Has had PT for her R shoulder.  Is able to move it but has constant pain in it and PT did not help.  She thinks her R shoulder does not have stability Last visit was a while ago.   Neck pain and tongue tearing from the R side of her mouth are of primary concern today.  She is frustrated because she does not feel that people are believing her.      CTV 02/12/25:   Evaluation of the soft tissues of the neck reveals normal appearing  soft tissues, no lymphadenopathy. Right frontal ramakrishna hole. Evaluation  of the cervical spine reveals no high grade spinal canal or neural  foraminal stenosis. The visualized lung apices appear unremarkable.                                           Impression:  1.  CTV of the head reveals no intracranial venous thrombus or  stenosis.  2.  CTV of the neck reveals no venous thrombosis. No significant  internal jugular vein narrowing with head neutral and head flexed.  Findings are of indeterminate clinical significance.    DIZZINESS:  Dizziness and lightheadedness especially over the last month or so.  Loud noises and being upright for a while the dizziness is worse.  If she lays down then it resolves.  Feels lightheaded   Spinning vertigo: Occasionally   Rocking vertigo: No  Triggers: Loud noise, being upright  Last VNG: None  Fluid in right ear, used a Q-tip and it was bloody.  She says that she was tested for CSF leaks but she reports imaging was negative.  She has  not had a blood patch. 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  She has round window reinforcement was done at the same time as her R styloidectomy that was suppose to help with her tinnitus, sound sensitivity and dizziness. The surgery helped bring theses symptoms down significantly, but symptoms are not entirely resolved.      She feels loss of vision and increased head pressure when standing.  This occurs daily.  She has passed out once from standing up. Has not had an echocardiogram.      AURAL SYMPTOMS:  Tinnitus: yes, pulsatile - now improved after round window surgery to repair superior canal dehiscence    Ear pressure:   Hearing loss: No  Last audiogram:     HEADACHE:   Headaches since her teenage years.  No photophobia or nausea.  They would last a few hours and resolve with ibuprofen.  Her headaches worsened and changed after the chiropractic neck manipulation in 2023.  They now occur in the back of the head and feel like she was hit in the back of the head.  Throbbing in nature.  They occur every few days and last a few hours.  She takes tramadol for her pain and this helps with the head pain as well.  No photophobia or phonophobia or nausea with the new headaches.    Aura: No  Last brain imaging: [facility] in 08/15/24 without acute intracranial pathology.  She had an EVD pressure monitor in place at that time.  Was inpatient at that time for ICP monitoring and had an an episode of pressure in R side of head/R ear with numbness & tingling in in R hand/arm/some of R leg.  ICP increased up to 28 for roughly 10-20 seconds. ICP back down to 15 - with some pressure and n/t remaining. This happened while pt was just sitting in chair. Was on diamox and topiramate in the past to try and treat concern for elevated ICP.  Was only on the topiramate during her hospitalization.      NECK/UPPER EXT SYMPTOMS:  Neck pain: Yes - R face down the R neck into the R shoulder - pain is described as achy/pulling sensation  - feels like R shoulder is falling out.  Pain in armpit as well.  Everything feels better when she lies down.  She works while reclined and lying down flat is the most comfortable position for her to be.    Shoulder pain: Yes  Arm pain: No  Hand pain: No  Hand weakness: No  Numbness/tingling hands: arms and hand especially on the R, sometimes on the L - has some tingling starting in her left leg as well  Color change hands: No  Swelling hands: No  Last EMG:10/11/23 - Normal     BULBAR SYMPTOMS:  Dysphagia: Gets a snapping sensation in her throat when she swallows, but nothing stuck in the throat, no choking or coughing.    Tongue is tearing away from the side of her mouth.    Throat pressure: Some sensations of pressure in the neck, but not so much the throat that has improved since the R styloidectomy  Chronic cough: No    CARDIAC:  Chest pain: Yes - previously on R, now on the left side since December - happens every few days and lasts a few minutes, no specific pattern has been identified - not necessarily around meals.    Shortness of breath: Yes - becomes short of breath easily when climbing a flight a of stairs, but not while at rest.    Palpitation: yes - heart skips a beat sometimes  Syncope: yes - once while going from sitting to standing   Last Echo: None    COGNITIVE:  Memory: Yes - has been bad since neck manipulation along with her other symptoms - was struggling with short term memory issues for a while since, but has now gotten better.    Attention: Yes - similar timeline for the memory concerns - poor after the event and then improved - she almost lost her job because it was effecting her job performance.    Processing speed: Yes - similar to above    OTHER:  Pelvic pressure: No  Rectal pressure: Rectal prolapse possibly in December of 2024  Hematuria: No  Swelling in feet: 6-12 months after the neck manipulation event, but now normal.    Blurry vision as the day goes on.    ACTIVITY:  Concussions:  hit her head pretty hard at age 18 - she fell down the stairs and hit her head, did not lose consciousness and got staples   MVA:  No  Neck wires: No  Exercise: None - used to hike and bike but now can't due to the neck and R shoulder pain     PAST MEDICAL HISTORY:  Past Medical History:   Diagnosis Date     ADHD (attention deficit hyperactivity disorder)      Chiari malformation type I (H)      Depression      NOEL (generalized anxiety disorder)      Insomnia      Papilledema      Pseudotumor cerebri syndrome      TMJ (temporomandibular joint syndrome)         PAST SURGICAL HISTORY:  Past Surgical History:   Procedure Laterality Date     IR LUMBAR PUNCTURE  11/22/2023     IR LUMBAR PUNCTURE  12/07/2023     LASIK       OPTICAL TRACKING SYSTEM VENTRICULOSTOMY Right 8/15/2024    Procedure: VENTRICULOSTOMY, CEREBRAL VENTRICLE, USING OPTICAL TRACKING SYSTEM;  Surgeon: Micheal Olivares MD;  Location: [facility] OR     Resection of elongated styloid       TONSILLECTOMY       TUBAL LIGATION     5-8-24 RIGHT TRANS ORAL RESECTION OF ELONGATED STYLOID PROCESS, RIGHT TONSILLECTOMY (Right)  AND RIGHT EXPLORATORY TYMPANOTOMY WITH ROUND WINDOW REINFORCEMENT (Right: Ear ) Diagnosis: (ELONGATED STYLOID PROCESS, HYPERACUSIS)      SOCIAL HISTORY:  Social History     Socioeconomic History     Marital status:      Spouse name: Not on file     Number of children: Not on file     Years of education: Not on file     Highest education level: Not on file   Occupational History     Not on file   Tobacco Use     Smoking status: Former     Current packs/day: 0.25     Types: Cigarettes     Passive exposure: Past     Smokeless tobacco: Never     Tobacco comments:     she is not exposed to tobacco at home   Substance and Sexual Activity     Alcohol use: Not Currently     Drug use: Yes     Comment: uses medical THC edibles or vape     Sexual activity: Yes     Partners: Male     Birth control/protection: Condom, Injection   Other Topics  "Concern     Parent/sibling w/ CABG, MI or angioplasty before 65F 55M? No   Social History Narrative    Works at [employer] on 4:30-1 AM shift     Social Drivers of Health     Financial Resource Strain: Low Risk  (1/5/2024)    Received from [facility]    Financial Resource Strain      Difficulty of Paying Living Expenses: 3      Difficulty of Paying Living Expenses: Not on file   Food Insecurity: No Food Insecurity (1/5/2024)    Received from [facility]    Food Insecurity      Do you worry your food will run out before you are able to buy more?: 1   Transportation Needs: No Transportation Needs (1/5/2024)    Received from [facility]    Transportation Needs      Does lack of transportation keep you from medical appointments?: 1      Does lack of transportation keep you from work, meetings or getting things that you need?: 1   Physical Activity: Not on file   Stress: Not on file   Social Connections: Socially Isolated (1/5/2024)    Received from [facility]    Social Connections      Do you often feel lonely or isolated from those around you?: 4   Interpersonal Safety: Not on file   Housing Stability: Low Risk  (1/5/2024)    Received from [facility]    Housing Stability      What is your housing situation today?: 1         ALLERGIES:  Allergies   Allergen Reactions     Fentanyl Nausea and Vomiting, Nausea and Other (See Comments)     Patient reports this does not help with pain and only gives \"high\" feeling    Does not help with pain, only makes pt feel \"high\"        MEDICATIONS:    Current Outpatient Medications:      acetaminophen (TYLENOL) 325 MG tablet, Take 650 mg by mouth every 4 hours as needed for pain, Disp: , Rfl:      amphetamine-dextroamphetamine (ADDERALL XR) 10 MG 24 hr capsule, Take 20 " "mg by mouth every morning., Disp: , Rfl:      amphetamine-dextroamphetamine (ADDERALL) 10 MG tablet, Take 10 mg by mouth every morning., Disp: , Rfl:      amphetamine-dextroamphetamine (ADDERALL) 5 MG tablet, Take 10 mg by mouth every morning., Disp: , Rfl:      clindamycin (CLINDAMAX) 1 % external gel, Apply topically 2 times daily as needed (acne), Disp: , Rfl:      DULoxetine (CYMBALTA) 30 MG capsule, Take 1 capsule by mouth every evening, Disp: , Rfl:      galcanezumab-gnlm (EMGALITY) 120 MG/ML injection, Inject 1 mL (120 mg) subcutaneously every 28 days., Disp: 1 mL, Rfl: 11     ibuprofen (ADVIL/MOTRIN) 200 MG tablet, Take 2 tablets (400 mg) by mouth every 8 hours as needed for pain, Disp: 20 tablet, Rfl: 0     liraglutide (VICTOZA) 18 MG/3ML solution, Inject 1.2 mg subcutaneously daily, Disp: , Rfl:      LORazepam (ATIVAN) 0.5 MG tablet, Take 1 tablet by mouth daily as needed, Disp: , Rfl:      ondansetron (ZOFRAN) 8 MG tablet, Take 8 mg by mouth every 8 hours as needed for nausea, Disp: , Rfl:      propranolol (INDERAL) 10 MG tablet, Take 1 tablet by mouth every morning, Disp: , Rfl:      topiramate (TOPAMAX) 25 MG tablet, Take 2 tablets (50 mg) by mouth at bedtime., Disp: 180 tablet, Rfl: 3     traMADol (ULTRAM) 50 MG tablet, Take 100 mg by mouth daily, Disp: , Rfl:      traZODone (DESYREL) 50 MG tablet, Take 50 mg by mouth at bedtime, Disp: , Rfl:      vilazodone (VIIBRYD) 20 MG TABS tablet, Take 20 mg by mouth every morning, Disp: , Rfl:      amphetamine-dextroamphetamine (ADDERALL XR) 30 MG 24 hr capsule, Take 30 mg by mouth every morning, Disp: , Rfl:   No current facility-administered medications for this visit.     PHYSICAL EXAM:  Vitals:  /76 (BP Location: Right arm, Patient Position: Sitting, Cuff Size: Adult Large)   Pulse 100   Resp 14   Ht 1.588 m (5' 2.5\")   Wt 112.2 kg (247 lb 4.8 oz)   SpO2 99%   BMI 44.51 kg/m      General: Patient is well-nourished, well-groomed, intermittently " tearful. Presents alone.     HEENT: Head is atraumatic, eyes look normal exteriorly, throat clear, neck supple.  Ext: Hands are cold to palpation, but appears well-perfused grossly. No edema. Good pulses.     Neurologic:  Mental Status: Alert and oriented to person, place, time, and situation.  Able to provide an excellent history.     Cranial Nerves: Visual fields full to confrontation. Pupils equal and reactive to light.  Extraocular movements full.  Face sensation reduced on the R throughout V1-3 segments as well as decreased sensation of the right neck and right upper chest/shoulder.  Normal hearing to finger rub. Face symmetric with normal movements. Tongue and palate midline.  Normal shoulder elevation.  Leftward head tilt noted while patient is seated in a neutral position.      Motor: Normal bulk and tone.  No pronator drift.  Normal foot taps.  Full strength to confrontational testing. Can full abduct both arms.     Sensory: Normal light touch, temperature and vibration throughout the upper and lower extremities.      Reflexes: Biceps, Brachioradialis, Triceps, Knees, Ankles 2/4.     Coordination: Normal finger to nose, rapid alternating movements    Gait: Normal stance width.  Negative Romberg.  Good gait initiation.  Good stride length.  Good arm swing.  Normal turn. Able to walk 5 steps in tandem.      Beighton Score (1 point for each joint)   Fifth finger extension > 90 degrees 0   Thumb touches the forearm on wrist flexion 1   Elbow extension >180 degrees 2   Knee extension >180 degrees 0   Places hands flat on the floor with knees extended 2      TOTAL    5     Upper Limb Tension Test for Thoracic Outlet Syndrome:  Arms abducted: Numbness and tingling develop in RIGHT hand only    Arms abducted head turned to the RIGHT:   Right hand gets worse   Left hand normal  Arms abducted head turned to the LEFT:   Left hand gets triggered   Right hand gets worse    Arms abducted head tilt to the RIGHT:   Right  hand gets worse   Left hand gets worse   Arms abducted head tilt to the LEFT:   Left hand gets better   Right hand remains the same    Pain Right scalene: No  Pain Left scalene: Yes with radiation down arm  Pain Right sternocleidomastoid: Yes  Pain Left sternocleidomastoid: No    Pain under the RIGHT pectoralis minor tendon: Yes, mild with radiation to the arm  Pain at the RIGHT 1st rib-sternum insertion site: Yes with radiation to shoulder  Pain under the LEFT pectoralis minor tendon: No  Pain at the LEFT 1st rib-sternum insertion site: No    Bilateral C1 tenderness to palpation     DATA:  All available and relevant labs, imaging, and other procedures were reviewed personally.   Last brain imaging:  CTV Head Neck w Contrast  Narrative: Exam: CTV HEAD NECK W CONTRAST 2/12/2025 12:06 PM  Reconstruction by the Radiologist on 3D workstation    History:  34F with intractable headaches, question venous  compression.; Compression of vein; Neck pain; Vertigo; Intracranial  pressure increased.    Ordering provider: GEMINI RANGEL CHA.    Comparison: None    Technique: Post intravenous contrast imaging was obtained of the head  and neck with thin sections from the vertex through the lung apices  with a delay for venogram purposes. Patient was imaged in the neutral  and head flex position(s). Images were reviewed on the 3D workstation  and manipulated.    Contrast Dose: ISOVUE 370 70cc    Findings:    HEAD:  Head CTV demonstrates no definite occlusion or thrombus within the  major dural and deep intracranial venous sinuses.     NECK  No internal jugular vein thrombosis on the left or right.     Left:  Styloid process measures: 3.4 cm. There is no significant  calcification of the stylohyoid ligament. The paravertebral venous  plexus is not dilated or distorted. In neutral position, there is mild  narrowing of the upper internal jugular vein at the skull base  anterior to the C1 transverse process, no narrowing of the middle  or  lower internal jugular vein.    Repeat imaging with head flex reveals no significant change in caliber  of the internal jugular vein.    Right:  Styloid process measures: 2.5 cm. There is no significant  calcification of the stylohyoid ligament. The paravertebral venous  plexus is not dilated or distorted. In neutral position, there is mild  narrowing of the upper internal jugular vein at the skull base  anterior to the C1 transverse process, no narrowing of the middle or  lower internal jugular vein.    Repeat imaging with head flex reveals no significant change in caliber  of the internal jugular vein.    Evaluation of the soft tissues of the neck reveals normal appearing  soft tissues, no lymphadenopathy. Right frontal ramakrishna hole. Evaluation  of the cervical spine reveals no high grade spinal canal or neural  foraminal stenosis. The visualized lung apices appear unremarkable.   Impression: Impression:  1.  CTV of the head reveals no intracranial venous thrombus or  stenosis.  2.  CTV of the neck reveals no venous thrombosis. No significant  internal jugular vein narrowing with head neutral and head flexed.  Findings are of indeterminate clinical significance.    JERRY NICHOLS MD         SYSTEM ID:  [id number]  US Up Ex Don & PPG Thor Outlet Syn [place] [facility] [place]: ULTRASOUND UPPER EXTREMITY VEIN AND PPG THORACIC OUTLET SYNDROME  BILATERAL 2/12/2025 11:31 AM    CLINICAL HISTORY: 34F with intractable headaches, question venous  compression.; Compression of vein; TOS (thoracic outlet syndrome).     COMPARISONS: None available.    REFERRING PROVIDER: GEMINI RANGEL CHA    TECHNIQUE: Bilateral innominate, subclavian, and axillary veins were  evaluated grayscale, color Doppler, Doppler waveform ultrasound.  Bilateral subclavian veins were evaluated with color Doppler and  Doppler waveform imaging through abduction maneuvers.    Bilateral index finger PPG's obtained at rest and with provocative  positions.    Bilateral  internal jugular veins evaluated at rest with grayscale,  color Doppler, and Doppler waveform ultrasound. Bilateral internal  jugular veins evaluated with color Doppler and Doppler waveform  ultrasound through maneuvers.    FINDINGS:  RIGHT:       REST:            INTERNAL JUGULAR VEIN: 74 cm/s, phasic, fully compressible            INNOMINATE VEIN: 178 cm/s, phasic            SUBCLAVIAN VEIN, medial: 63 cm/s, phasic            SUBCLAVIAN VEIN, mid: 63 cm/s, phasic, fully compressible            SUBCLAVIAN VEIN, lateral: 95 cm/s, phasic, fully  compressible            AXILLARY VEIN: 29 cm/s, phasic, fully compressible         MID SUBCLAVIAN VEIN, sitting erect:            0 degrees: 118 cm/s, phasic            90 degrees: 19 cm/s, phasic            135 degrees: 0 cm/s, occluded            180 degrees: 0 cm/s, occluded         INTERNAL JUGULAR VEIN, sitting erect:            Neutral: 132 cm/s, phasic            Right: 156 cm/s, phasic            Left: 76 cm/s, phasic            Extension: 318 cm/s, phasic            Flexion: 121 cm/s, phasic         PPGs:            Baseline: Normal            Arms 90: Normal            Arms 180: ABNORMAL - diminished              : Normal             head right: Normal             head left: Normal    LEFT:       REST:            INTERNAL JUGULAR VEIN: 95 cm/s, phasic, fully compressible            INNOMINATE VEIN: 148 cm/s, phasic            SUBCLAVIAN VEIN, medial: 32 cm/s, phasic            SUBCLAVIAN VEIN, mid: 68 cm/s, phasic, fully compressible            SUBCLAVIAN VEIN, lateral: 98 cm/s, phasic, fully  compressible            AXILLARY VEIN: 19 cm/s, phasic, fully compressible         MID SUBCLAVIAN VEIN, sitting erect:            0 degrees: 33 cm/s, phasic            90 degrees: 0 cm/s, occluded            135 degrees: 0 cm/s, occluded            180 degrees: 0 cm/s, occluded         INTERNAL JUGULAR VEIN, sitting erect:            Neutral: 189 cm/s,  phasic            Right: 233 cm/s, phasic            Left: 108 cm/s, phasic            Extension: 34 cm/s, phasic            Flexion: 129 cm/s, phasic        PPGs:            Baseline: Normal            Arms 90: Normal            Arms 180: ABNORMAL - diminished - nearly occluded            : Normal             head right: Normal             head left: Normal  Impression: IMPRESSION: Thoracic outlet/inlet physiology suggested. Correlate for  symptoms.    1. RIGHT:       A. 3.28 velocity ratio from the axillary to lateral subclavian  vein, etiology not demonstrated.       B. Subclavian vein occludes in 135 and 180 degrees.       C. No internal jugular venous occlusion demonstrated with  maneuvers.       D. PPG diminished in Arms 180. No occlusion demonstrated.    2. LEFT:       A. 5.16 velocity ratio from the axillary to lateral subclavian  vein, etiology not demonstrated.       B. Subclavian vein occludes with maneuvers.       C. No internal jugular venous occlusion demonstrated with  maneuvers.       D. PPG nearly occludes in Arms [address]. No occlusion demonstrated.    ALVAREZ TRISTAN MD         SYSTEM ID:  [id number]    Patient seen with Dr. Joselito West PGY 3. I performed all the essential history and exam.     The longitudinal plan of care for the condition(s) below were addressed during this visit. Due to the added complexity in care, I will continue to support Paula in the subsequent management of this condition(s) and with the ongoing continuity of care of this condition(s).    60-minutes were spent in evaluation, examination, and documentation on the date of service      Again, thank you for allowing me to participate in the care of your patient.      Sincerely,    Alejandro MURILLO Cha, MD

## 2025-02-12 NOTE — PROGRESS NOTES
[facility]    Neurology Consult    2/12/2025      Paula Back MRN# [id number]   YOB: 1990 Age: 34 year old      Primary care provider:   Rigoberto French    Requesting provider:   Melita Miller    Reason for Consult:  Intractable headaches    IMPRESSIONS:  Paula Back is a 34 year old female with a past medical history of possible idiopathic intracranial hypertension diagnosed in 2016, 8 mm Chiari I malformation (on imaging from 2017, but diagnosed in 2023 - has not had surgery), cervical and lumbar spine degenerative changes, chronic headaches and chronic pain located around the right eye, jaw, and neck and shoulder. The right sided symptoms started after a traditional chiropractic adjustment. A prior transoral right sided styloidectomy with resection of a stylohyoid ligament was not helpful for relieving these symptoms. CTV shows that both internal jugular veins at the C1 level are open. Although the right styloid process is shorter than the left, it still extends below C1. The jugular vein has room here, however A a dynamic Doppler ultrasound revealed bilateral subclavian veins concerning for bilateral thoracic outlet syndrome. Due to symptoms present on the R, will proceed with PT and botox injections of the R cervical muscles and monitor for improvement. She has bilateral but quite right dominant pain in the thoracic outlet and pectoralis minor tendons. She has a leftward head tilt. She may have a bit of cervical dystonia is that impacting the venous structures under the muscles. We will start with getting her into the right physical therapy (she will send information of a local therapist). 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 Given some concerns of right shoulder instability, we will get a right shoulder MRI looking for rotator cuff injury.     Recommendations:  PT with pectoralis  minor and first rib mobilization on the R  Botox injections for R sided TOS  R shoulder MRI for chronic R shoulder pain and possible calcified tendinitis on prior R shoulder Xray  Follow-up for injections    HISTORY OF PRESENT ILLNESS:  34-year-old woman with a history of idiopathic intracranial hypertension diagnosed in 2016, 8 mm Chiari I malformation (on imaging from 2017, but diagnosed in 2023 - has not had surgery), intractable chronic migraine headaches with a right side predominance with pain located around the right eye, jaw, and neck.  Headaches are worse with bending over, coughing, and with lack of neck support.  Symptoms got triggered after chiropractic manipulation in February 2023.  Additional symptoms include cognitive slowing, visual blurring, palpitations, presyncope, dysphagia, imbalance and head pressure. Per her report, her right stylo-hyoid ligament was tearing through her throat, so she had a right transoral styloidectomy on 5-8-24, which unfortunately provided no relief.     Evaluations have included an MRI of the cervical spine 1-, MRA head 3-, head and neck CTA 2-, CTV head and neck 5-, retinal nerve fiber layer study 5-, EMG 10-.  These were only significant for the Chiari I malformation with normal CSF flow.  Opening pressure on LP 4- was 22 cm of water.  Hospitalization from 8-15-24 to 8-17-24 for intracranial pressure monitoring was reported to show a pressure of <13 cm of water without a correlation with her headaches. Detailed notes indicate that pressure did spike as high as 28cm H20 however.    She has tried a number of medications including Diamox, topiramate, propranolol, and Emgality for prevention and a number of abortive treatments, none of which have been effective.  She was seen by the pain clinic and was told that they could not do anything additional for her.     She currently remains on topiramate.  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  There was concern for tissue sloughing - she says pieces of her tongue are falling off.  The diamox was also causing an acidosis.      Has had PT for her R shoulder.  Is able to move it but has constant pain in it and PT did not help.  She thinks her R shoulder does not have stability Last visit was a while ago.   Neck pain and tongue tearing from the R side of her mouth are of primary concern today.  She is frustrated because she does not feel that people are believing her.      CTV 02/12/25:   Evaluation of the soft tissues of the neck reveals normal appearing  soft tissues, no lymphadenopathy. Right frontal ramakrishna hole. Evaluation  of the cervical spine reveals no high grade spinal canal or neural  foraminal stenosis. The visualized lung apices appear unremarkable.                                           Impression:  1.  CTV of the head reveals no intracranial venous thrombus or  stenosis.  2.  CTV of the neck reveals no venous thrombosis. No significant  internal jugular vein narrowing with head neutral and head flexed.  Findings are of indeterminate clinical significance.    DIZZINESS:  Dizziness and lightheadedness especially over the last month or so.  Loud noises and being upright for a while the dizziness is worse.  If she lays down then it resolves.  Feels lightheaded   Spinning vertigo: Occasionally   Rocking vertigo: No  Triggers: Loud noise, being upright  Last VNG: None  Fluid in right ear, used a Q-tip and it was bloody.  She says that she was tested for CSF leaks but she reports imaging was negative.  She has not had a blood patch. 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  She has round window reinforcement was done at the same time as her R styloidectomy that was suppose to help with her tinnitus, sound sensitivity and dizziness. The surgery helped bring theses symptoms down significantly, but symptoms are not entirely  resolved.      She feels loss of vision and increased head pressure when standing.  This occurs daily.  She has passed out once from standing up. Has not had an echocardiogram.      AURAL SYMPTOMS:  Tinnitus: yes, pulsatile - now improved after round window surgery to repair superior canal dehiscence    Ear pressure:   Hearing loss: No  Last audiogram:     HEADACHE:   Headaches since her teenage years.  No photophobia or nausea.  They would last a few hours and resolve with ibuprofen.  Her headaches worsened and changed after the chiropractic neck manipulation in 2023.  They now occur in the back of the head and feel like she was hit in the back of the head.  Throbbing in nature.  They occur every few days and last a few hours.  She takes tramadol for her pain and this helps with the head pain as well.  No photophobia or phonophobia or nausea with the new headaches.    Aura: No  Last brain imaging: [facility] in 08/15/24 without acute intracranial pathology.  She had an EVD pressure monitor in place at that time.  Was inpatient at that time for ICP monitoring and had an an episode of pressure in R side of head/R ear with numbness & tingling in in R hand/arm/some of R leg.  ICP increased up to 28 for roughly 10-20 seconds. ICP back down to 15 - with some pressure and n/t remaining. This happened while pt was just sitting in chair. Was on diamox and topiramate in the past to try and treat concern for elevated ICP.  Was only on the topiramate during her hospitalization.      NECK/UPPER EXT SYMPTOMS:  Neck pain: Yes - R face down the R neck into the R shoulder - pain is described as achy/pulling sensation - feels like R shoulder is falling out.  Pain in armpit as well.  Everything feels better when she lies down.  She works while reclined and lying down flat is the most comfortable position for her to be.    Shoulder pain: Yes  Arm pain: No  Hand pain: No  Hand weakness: No  Numbness/tingling hands: arms and hand especially on  the R, sometimes on the L - has some tingling starting in her left leg as well  Color change hands: No  Swelling hands: No  Last EMG:10/11/23 - Normal     BULBAR SYMPTOMS:  Dysphagia: Gets a snapping sensation in her throat when she swallows, but nothing stuck in the throat, no choking or coughing.    Tongue is tearing away from the side of her mouth.    Throat pressure: Some sensations of pressure in the neck, but not so much the throat that has improved since the R styloidectomy  Chronic cough: No    CARDIAC:  Chest pain: Yes - previously on R, now on the left side since December - happens every few days and lasts a few minutes, no specific pattern has been identified - not necessarily around meals.    Shortness of breath: Yes - becomes short of breath easily when climbing a flight a of stairs, but not while at rest.    Palpitation: yes - heart skips a beat sometimes  Syncope: yes - once while going from sitting to standing   Last Echo: None    COGNITIVE:  Memory: Yes - has been bad since neck manipulation along with her other symptoms - was struggling with short term memory issues for a while since, but has now gotten better.    Attention: Yes - similar timeline for the memory concerns - poor after the event and then improved - she almost lost her job because it was effecting her job performance.    Processing speed: Yes - similar to above    OTHER:  Pelvic pressure: No  Rectal pressure: Rectal prolapse possibly in December of 2024  Hematuria: No  Swelling in feet: 6-12 months after the neck manipulation event, but now normal.    Blurry vision as the day goes on.    ACTIVITY:  Concussions: hit her head pretty hard at age 18 - she fell down the stairs and hit her head, did not lose consciousness and got staples   MVA:  No  Neck wires: No  Exercise: None - used to hike and bike but now can't due to the neck and R shoulder pain     PAST MEDICAL HISTORY:  Past Medical History:   Diagnosis Date    ADHD (attention  deficit hyperactivity disorder)     Chiari malformation type I (H)     Depression     NOEL (generalized anxiety disorder)     Insomnia     Papilledema     Pseudotumor cerebri syndrome     TMJ (temporomandibular joint syndrome)         PAST SURGICAL HISTORY:  Past Surgical History:   Procedure Laterality Date    IR LUMBAR PUNCTURE  11/22/2023    IR LUMBAR PUNCTURE  12/07/2023    LASIK      OPTICAL TRACKING SYSTEM VENTRICULOSTOMY Right 8/15/2024    Procedure: VENTRICULOSTOMY, CEREBRAL VENTRICLE, USING OPTICAL TRACKING SYSTEM;  Surgeon: Micheal Olivares MD;  Location: [facility] OR    Resection of elongated styloid      TONSILLECTOMY      TUBAL LIGATION     5-8-24 RIGHT TRANS ORAL RESECTION OF ELONGATED STYLOID PROCESS, RIGHT TONSILLECTOMY (Right)  AND RIGHT EXPLORATORY TYMPANOTOMY WITH ROUND WINDOW REINFORCEMENT (Right: Ear ) Diagnosis: (ELONGATED STYLOID PROCESS, HYPERACUSIS)      SOCIAL HISTORY:  Social History     Socioeconomic History    Marital status:      Spouse name: Not on file    Number of children: Not on file    Years of education: Not on file    Highest education level: Not on file   Occupational History    Not on file   Tobacco Use    Smoking status: Former     Current packs/day: 0.25     Types: Cigarettes     Passive exposure: Past    Smokeless tobacco: Never    Tobacco comments:     she is not exposed to tobacco at home   Substance and Sexual Activity    Alcohol use: Not Currently    Drug use: Yes     Comment: uses medical THC edibles or vape    Sexual activity: Yes     Partners: Male     Birth control/protection: Condom, Injection   Other Topics Concern    Parent/sibling w/ CABG, MI or angioplasty before 65F 55M? No   Social History Narrative    Works at [email or web address] on 4:30-1 AM shift     Social Drivers of Health     Financial Resource Strain: Low Risk  (1/5/2024)    [facility]    Financial  "Resource Strain     Difficulty of Paying Living Expenses: 3     Difficulty of Paying Living Expenses: Not on file   Food Insecurity: No Food Insecurity (1/5/2024)    Received from [facility]    Food Insecurity     Do you worry your food will run out before you are able to buy more?: 1   Transportation Needs: No Transportation Needs (1/5/2024)    Received from [facility]    Transportation Needs     Does lack of transportation keep you from medical appointments?: 1     Does lack of transportation keep you from work, meetings or getting things that you need?: 1   Physical Activity: Not on file   Stress: Not on file   Social Connections: Socially Isolated (1/5/2024)    Received from [facility]    Social Connections     Do you often feel lonely or isolated from those around you?: 4   Interpersonal Safety: Not on file   Housing Stability: Low Risk  (1/5/2024)    Received from [facility]    Housing Stability     What is your housing situation today?: 1         ALLERGIES:  Allergies   Allergen Reactions    Fentanyl Nausea and Vomiting, Nausea and Other (See Comments)     Patient reports this does not help with pain and only gives \"high\" feeling    Does not help with pain, only makes pt feel \"high\"        MEDICATIONS:    Current Outpatient Medications:     acetaminophen (TYLENOL) 325 MG tablet, Take 650 mg by mouth every 4 hours as needed for pain, Disp: , Rfl:     amphetamine-dextroamphetamine (ADDERALL XR) 10 MG 24 hr capsule, Take 20 mg by mouth every morning., Disp: , Rfl:     amphetamine-dextroamphetamine (ADDERALL) 10 MG tablet, Take 10 mg by mouth every morning., Disp: , Rfl:     amphetamine-dextroamphetamine (ADDERALL) 5 MG tablet, Take 10 mg by mouth every morning., Disp: , Rfl:     clindamycin (CLINDAMAX) 1 % external gel, Apply topically 2 times daily as needed (acne), Disp: , " "Rfl:     DULoxetine (CYMBALTA) 30 MG capsule, Take 1 capsule by mouth every evening, Disp: , Rfl:     galcanezumab-gnlm (EMGALITY) 120 MG/ML injection, Inject 1 mL (120 mg) subcutaneously every 28 days., Disp: 1 mL, Rfl: 11    ibuprofen (ADVIL/MOTRIN) 200 MG tablet, Take 2 tablets (400 mg) by mouth every 8 hours as needed for pain, Disp: 20 tablet, Rfl: 0    liraglutide (VICTOZA) 18 MG/3ML solution, Inject 1.2 mg subcutaneously daily, Disp: , Rfl:     LORazepam (ATIVAN) 0.5 MG tablet, Take 1 tablet by mouth daily as needed, Disp: , Rfl:     ondansetron (ZOFRAN) 8 MG tablet, Take 8 mg by mouth every 8 hours as needed for nausea, Disp: , Rfl:     propranolol (INDERAL) 10 MG tablet, Take 1 tablet by mouth every morning, Disp: , Rfl:     topiramate (TOPAMAX) 25 MG tablet, Take 2 tablets (50 mg) by mouth at bedtime., Disp: 180 tablet, Rfl: 3    traMADol (ULTRAM) 50 MG tablet, Take 100 mg by mouth daily, Disp: , Rfl:     traZODone (DESYREL) 50 MG tablet, Take 50 mg by mouth at bedtime, Disp: , Rfl:     vilazodone (VIIBRYD) 20 MG TABS tablet, Take 20 mg by mouth every morning, Disp: , Rfl:     amphetamine-dextroamphetamine (ADDERALL XR) 30 MG 24 hr capsule, Take 30 mg by mouth every morning, Disp: , Rfl:   No current facility-administered medications for this visit.     PHYSICAL EXAM:  Vitals:  /76 (BP Location: Right arm, Patient Position: Sitting, Cuff Size: Adult Large)   Pulse 100   Resp 14   Ht 1.588 m (5' 2.5\")   Wt 112.2 kg (247 lb 4.8 oz)   SpO2 99%   BMI 44.51 kg/m      General: Patient is well-nourished, well-groomed, intermittently tearful. Presents alone.     HEENT: Head is atraumatic, eyes look normal exteriorly, throat clear, neck supple.  Ext: Hands are cold to palpation, but appears well-perfused grossly. No edema. Good pulses.     Neurologic:  Mental Status: Alert and oriented to person, place, time, and situation.  Able to provide an excellent history.     Cranial Nerves: Visual fields full to " confrontation. Pupils equal and reactive to light.  Extraocular movements full.  Face sensation reduced on the R throughout V1-3 segments as well as decreased sensation of the right neck and right upper chest/shoulder.  Normal hearing to finger rub. Face symmetric with normal movements. Tongue and palate midline.  Normal shoulder elevation.  Leftward head tilt noted while patient is seated in a neutral position.      Motor: Normal bulk and tone.  No pronator drift.  Normal foot taps.  Full strength to confrontational testing. Can full abduct both arms.     Sensory: Normal light touch, temperature and vibration throughout the upper and lower extremities.      Reflexes: Biceps, Brachioradialis, Triceps, Knees, Ankles 2/4.     Coordination: Normal finger to nose, rapid alternating movements    Gait: Normal stance width.  Negative Romberg.  Good gait initiation.  Good stride length.  Good arm swing.  Normal turn. Able to walk 5 steps in tandem.      Beighton Score (1 point for each joint)   Fifth finger extension > 90 degrees 0   Thumb touches the forearm on wrist flexion 1   Elbow extension >180 degrees 2   Knee extension >180 degrees 0   Places hands flat on the floor with knees extended 2      TOTAL    5     Upper Limb Tension Test for Thoracic Outlet Syndrome:  Arms abducted: Numbness and tingling develop in RIGHT hand only    Arms abducted head turned to the RIGHT:   Right hand gets worse   Left hand normal  Arms abducted head turned to the LEFT:   Left hand gets triggered   Right hand gets worse    Arms abducted head tilt to the RIGHT:   Right hand gets worse   Left hand gets worse   Arms abducted head tilt to the LEFT:   Left hand gets better   Right hand remains the same    Pain Right scalene: No  Pain Left scalene: Yes with radiation down arm  Pain Right sternocleidomastoid: Yes  Pain Left sternocleidomastoid: No    Pain under the RIGHT pectoralis minor tendon: Yes, mild with radiation to the arm  Pain at the  RIGHT 1st rib-sternum insertion site: Yes with radiation to shoulder  Pain under the LEFT pectoralis minor tendon: No  Pain at the LEFT 1st rib-sternum insertion site: No    Bilateral C1 tenderness to palpation     DATA:  All available and relevant labs, imaging, and other procedures were reviewed personally.   Last brain imaging:  CTV Head Neck w Contrast  Narrative: Exam: CTV HEAD NECK W CONTRAST 2/12/2025 12:06 PM  Reconstruction by the Radiologist on 3D workstation    History:  34F with intractable headaches, question venous  compression.; Compression of vein; Neck pain; Vertigo; Intracranial  pressure increased.    Ordering provider: GEMINI RANGEL CHA.    Comparison: None    Technique: Post intravenous contrast imaging was obtained of the head  and neck with thin sections from the vertex through the lung apices  with a delay for venogram purposes. Patient was imaged in the neutral  and head flex position(s). Images were reviewed on the 3D workstation  and manipulated.    Contrast Dose: ISOVUE 370 70cc    Findings:    HEAD:  Head CTV demonstrates no definite occlusion or thrombus within the  major dural and deep intracranial venous sinuses.     NECK  No internal jugular vein thrombosis on the left or right.     Left:  Styloid process measures: 3.4 cm. There is no significant  calcification of the stylohyoid ligament. The paravertebral venous  plexus is not dilated or distorted. In neutral position, there is mild  narrowing of the upper internal jugular vein at the skull base  anterior to the C1 transverse process, no narrowing of the middle or  lower internal jugular vein.    Repeat imaging with head flex reveals no significant change in caliber  of the internal jugular vein.    Right:  Styloid process measures: 2.5 cm. There is no significant  calcification of the stylohyoid ligament. The paravertebral venous  plexus is not dilated or distorted. In neutral position, there is mild  narrowing of the upper internal  jugular vein at the skull base  anterior to the C1 transverse process, no narrowing of the middle or  lower internal jugular vein.    Repeat imaging with head flex reveals no significant change in caliber  of the internal jugular vein.    Evaluation of the soft tissues of the neck reveals normal appearing  soft tissues, no lymphadenopathy. Right frontal ramakrishna hole. Evaluation  of the cervical spine reveals no high grade spinal canal or neural  foraminal stenosis. The visualized lung apices appear unremarkable.   Impression: Impression:  1.  CTV of the head reveals no intracranial venous thrombus or  stenosis.  2.  CTV of the neck reveals no venous thrombosis. No significant  internal jugular vein narrowing with head neutral and head flexed.  Findings are of indeterminate clinical significance.    JERRY NICHOLS MD         SYSTEM ID:  [id number]  US Up Ex Don & PPG Thor Outlet Syn [facility] [place]  Narrative: ULTRASOUND UPPER EXTREMITY VEIN AND PPG THORACIC OUTLET SYNDROME  BILATERAL 2/12/2025 11:31 AM    CLINICAL HISTORY: 34F with intractable headaches, question venous  compression.; Compression of vein; TOS (thoracic outlet syndrome).     COMPARISONS: None available.    REFERRING PROVIDER: GEMINI RANGEL CHA    TECHNIQUE: Bilateral innominate, subclavian, and axillary veins were  evaluated grayscale, color Doppler, Doppler waveform ultrasound.  Bilateral subclavian veins were evaluated with color Doppler and  Doppler waveform imaging through abduction maneuvers.    Bilateral index finger PPG's obtained at rest and with provocative  positions.    Bilateral internal jugular veins evaluated at rest with grayscale,  color Doppler, and Doppler waveform ultrasound. Bilateral internal  jugular veins evaluated with color Doppler and Doppler waveform  ultrasound through maneuvers.    FINDINGS:  RIGHT:       REST:            INTERNAL JUGULAR VEIN: 74 cm/s, phasic, fully compressible            INNOMINATE VEIN: 178 cm/s, phasic             SUBCLAVIAN VEIN, medial: 63 cm/s, phasic            SUBCLAVIAN VEIN, mid: 63 cm/s, phasic, fully compressible            SUBCLAVIAN VEIN, lateral: 95 cm/s, phasic, fully  compressible            AXILLARY VEIN: 29 cm/s, phasic, fully compressible         MID SUBCLAVIAN VEIN, sitting erect:            0 degrees: 118 cm/s, phasic            90 degrees: 19 cm/s, phasic            135 degrees: 0 cm/s, occluded            180 degrees: 0 cm/s, occluded         INTERNAL JUGULAR VEIN, sitting erect:            Neutral: 132 cm/s, phasic            Right: 156 cm/s, phasic            Left: 76 cm/s, phasic            Extension: 318 cm/s, phasic            Flexion: 121 cm/s, phasic         PPGs:            Baseline: Normal            Arms 90: Normal            Arms 180: ABNORMAL - diminished              : Normal             head right: Normal             head left: Normal    LEFT:       REST:            INTERNAL JUGULAR VEIN: 95 cm/s, phasic, fully compressible            INNOMINATE VEIN: 148 cm/s, phasic            SUBCLAVIAN VEIN, medial: 32 cm/s, phasic            SUBCLAVIAN VEIN, mid: 68 cm/s, phasic, fully compressible            SUBCLAVIAN VEIN, lateral: 98 cm/s, phasic, fully  compressible            AXILLARY VEIN: 19 cm/s, phasic, fully compressible         MID SUBCLAVIAN VEIN, sitting erect:            0 degrees: 33 cm/s, phasic            90 degrees: 0 cm/s, occluded            135 degrees: 0 cm/s, occluded            180 degrees: 0 cm/s, occluded         INTERNAL JUGULAR VEIN, sitting erect:            Neutral: 189 cm/s, phasic            Right: 233 cm/s, phasic            Left: 108 cm/s, phasic            Extension: 34 cm/s, phasic            Flexion: 129 cm/s, phasic        PPGs:            Baseline: Normal            Arms 90: Normal            Arms 180: ABNORMAL - diminished - nearly occluded            : Normal             head right: Normal             head left:  Normal  Impression: IMPRESSION: Thoracic outlet/inlet physiology suggested. Correlate for  symptoms.    1. RIGHT:       A. 3.28 velocity ratio from the axillary to lateral subclavian  vein, etiology not demonstrated.       B. Subclavian vein occludes in 135 and 180 degrees.       C. No internal jugular venous occlusion demonstrated with  maneuvers.       D. PPG diminished in Arms 180. No occlusion demonstrated.    2. LEFT:       A. 5.16 velocity ratio from the axillary to lateral subclavian  vein, etiology not demonstrated.       B. Subclavian vein occludes with maneuvers.       C. No internal jugular venous occlusion demonstrated with  maneuvers.       D. PPG nearly occludes in Arms [address]. No occlusion demonstrated.    ALVAREZ TRISTAN MD         SYSTEM ID:  [id number]    Patient seen with Dr. Joselito West PGY 3. I performed all the essential history and exam.     The longitudinal plan of care for the condition(s) below were addressed during this visit. Due to the added complexity in care, I will continue to support Paula in the subsequent management of this condition(s) and with the ongoing continuity of care of this condition(s).    60-minutes were spent in evaluation, examination, and documentation on the date of service

## 2025-02-12 NOTE — DISCHARGE INSTRUCTIONS

## 2025-02-13 NOTE — PATIENT INSTRUCTIONS
You have some concern for thoracic outlet syndrome and compression of your subclavian veins on your ultrasound results.  Due to having symptoms on the R, we will target this side first.      Recommendations:  PT with pectoralis minor and first rib mobilization on the R  Botox injections for R sided TOS  R shoulder MRI for chronic R shoulder pain and possible calcified tendinitis on prior R shoulder Xray

## 2025-02-20 ENCOUNTER — TELEPHONE (OUTPATIENT)
Dept: NEUROLOGY | Facility: CLINIC | Age: 35
End: 2025-02-20
Payer: COMMERCIAL

## 2025-02-20 NOTE — TELEPHONE ENCOUNTER
Patient confirmed scheduled appointment:  Date: 08/15/2025  Time: 4:30PM  Visit type: RETURN NEUROLOGY  Provider: RANDALL  Location: [facility]  Testing/imaging: MRI needed  Additional notes: N/A    Susan Barahona on 2/20/2025 at 1:56 PM

## 2025-04-14 ENCOUNTER — TELEPHONE (OUTPATIENT)
Dept: NEUROLOGY | Facility: CLINIC | Age: 35
End: 2025-04-14
Payer: COMMERCIAL

## 2025-04-14 NOTE — TELEPHONE ENCOUNTER
Patient confirmed scheduled appointment:  Date: 6/20/25  Time: 10am  Visit type: Neurotoxin  Provider: Elisabeth  Location: [facility]  Testing/imaging: NA  Additional notes: rescheduled the 7/18/25 appt to 6/20/25, per Dr. Barber.    Marissa Mtz on 4/14/2025 at 12:51 PM

## 2025-04-19 ENCOUNTER — TRANSFERRED RECORDS (OUTPATIENT)
Dept: HEALTH INFORMATION MANAGEMENT | Facility: CLINIC | Age: 35
End: 2025-04-19
Payer: COMMERCIAL

## 2025-05-20 DIAGNOSIS — G54.0 TOS (THORACIC OUTLET SYNDROME): ICD-10-CM

## 2025-05-20 DIAGNOSIS — M43.6 CONTRACTURE OF STERNOCLEIDOMASTOID MUSCLE: ICD-10-CM

## 2025-05-20 DIAGNOSIS — I87.1 COMPRESSION OF VEIN: Primary | ICD-10-CM

## 2025-05-20 DIAGNOSIS — R42 VERTIGO: ICD-10-CM

## 2025-05-21 ENCOUNTER — CARE COORDINATION (OUTPATIENT)
Dept: NEUROLOGY | Facility: CLINIC | Age: 35
End: 2025-05-21
Payer: COMMERCIAL

## 2025-05-21 NOTE — PROGRESS NOTES
Referral to Dr. Woodard, clinic notes, and imaging reports faxed to [facility].     Requested that Film Room push MRI, CTV, & US to [facility].

## 2025-07-01 ENCOUNTER — TELEPHONE (OUTPATIENT)
Dept: NEUROSURGERY | Facility: CLINIC | Age: 35
End: 2025-07-01
Payer: COMMERCIAL

## 2025-07-01 NOTE — TELEPHONE ENCOUNTER
Called patient and scheduled appointment w/ Paola Baker per Katey Chowdhury via Patient call list. -KB

## 2025-07-03 NOTE — TELEPHONE ENCOUNTER
Records Requested   July 7, 2025 7:48 AM   [facility]   Facility  [facility]   Outcome 7:50 am Sent request for imaging to be pushed to PACS. -ROEL Garciaer on 7/7/2025 at 2:47 PM Imaging resolved into PACS. -ROEL     REASON FOR VISIT: Migdalia Malformation   PROVIDER: Paola Baker PA-C   DATE OF APPT: 7/18/25    NOTES (FOR ALL VISITS) STATUS DETAILS   OFFICE NOTE from referring provider Internal 9/4/24 Leia Miller PA-C @[facility]-NeuroSurg     OFFICE NOTE from other specialist Care Everywhere 6/27/25, 2/28/25, 10/14/24 Brandon Bautista MD @[facility] Neuro    5/13/25 Yesica Garcia PA-C  @[facility]    5/2/25 Hernán Gallagher @[facility]    5/1/25 Nevaeh Mix, APRN, CNP  @[facility]    3/28/25, 2/12/25 [facility]    2/4/25 Berhane De @[facility]    See Additional Encounters   HOSPITAL ENCOUNTERS Internal 8/15/24-8/17/24 Micheal Olivares MD @[facility]     OPERATIVE REPORT Internal 8/15/24 Micheal Olivares MD @[facility] VENTRICULOSTOMY, CEREBRAL VENTRICLE, USING OPTICAL TRACKING SYSTEM      EMG Internal [facility]  10/11/23 EMG     MEDICATION LIST Internal    IMAGING  (FOR ALL VISITS)     MRI (HEAD, NECK, SPINE) PACS [facility]  3/8/24 MRV Brain    [facility]  4/24/23 MR Cervical Spine  4/24/23 MR Brain    [facility]  3/19/23 MRA Brain (COW)  3/19/23 MRA Neck (Carotid)  3/19/23 MR Brain     CT (HEAD, NECK, SPINE) Internal [facility]  2/12/25 CTV Head Neck  8/15/24 CT Head  8/15/24 CT Head  3/20/24 CT Facial Bones  3/20/24 CT Head

## 2025-07-12 ENCOUNTER — HEALTH MAINTENANCE LETTER (OUTPATIENT)
Age: 35
End: 2025-07-12

## 2025-07-18 ENCOUNTER — PRE VISIT (OUTPATIENT)
Dept: NEUROSURGERY | Facility: CLINIC | Age: 35
End: 2025-07-18

## 2025-08-15 ENCOUNTER — OFFICE VISIT (OUTPATIENT)
Dept: NEUROLOGY | Facility: CLINIC | Age: 35
End: 2025-08-15
Payer: COMMERCIAL

## 2025-08-15 VITALS
HEART RATE: 89 BPM | SYSTOLIC BLOOD PRESSURE: 111 MMHG | RESPIRATION RATE: 16 BRPM | OXYGEN SATURATION: 98 % | DIASTOLIC BLOOD PRESSURE: 80 MMHG

## 2025-08-15 DIAGNOSIS — M43.6 CONTRACTURE OF STERNOCLEIDOMASTOID MUSCLE: ICD-10-CM

## 2025-08-15 DIAGNOSIS — S02.101A: ICD-10-CM

## 2025-08-15 DIAGNOSIS — I87.1 COMPRESSION OF VEIN: ICD-10-CM

## 2025-08-15 DIAGNOSIS — G43.E19 INTRACTABLE CHRONIC MIGRAINE WITH AURA AND WITHOUT STATUS MIGRAINOSUS: Primary | ICD-10-CM

## 2025-09-03 ENCOUNTER — TELEPHONE (OUTPATIENT)
Dept: NEUROSURGERY | Facility: CLINIC | Age: 35
End: 2025-09-03
Payer: COMMERCIAL

## (undated) DEVICE — ESU CORD BIPOLAR GREEN 10-4000

## (undated) DEVICE — NDL BLUNT 17GA 1.5" 8881202330

## (undated) DEVICE — PREP CHLORAPREP CLEAR 3ML 930400

## (undated) DEVICE — PREP CHLORAPREP 26ML TINTED HI-LITE ORANGE 930815

## (undated) DEVICE — SOL NACL 0.9% IRRIG 1000ML BOTTLE 2F7124

## (undated) DEVICE — BONE WAX 2.5GM W31G

## (undated) DEVICE — SPONGE COTTONOID 1/2X1/2" 20-04S

## (undated) DEVICE — SHUNT STYLET 23CM AXIEM NAVIGATION SYSTEM 9735428

## (undated) DEVICE — CATH VENTRICLEAR II EVD ANTI IMPREG 50318

## (undated) DEVICE — SYR BULB IRRIG DOVER 60 ML LATEX FREE 67000

## (undated) DEVICE — NDL COUNTER 20CT 31142493

## (undated) DEVICE — SPONGE RAY-TEC 4X8" 7318

## (undated) DEVICE — BUR STRK CARBIDE ROUND 4.0MM 5820-110-040C

## (undated) DEVICE — SHUNT BECKER EXTERNAL DRAIN 46128

## (undated) DEVICE — SOL NACL 0.9% 10ML VIAL 0409-4888-02

## (undated) DEVICE — SU SILK 2-0 TIE 12X30" A305H

## (undated) DEVICE — RX SURGIFLO HEMOSTATIC MATRIX W/THROMBIN 8ML 2994

## (undated) DEVICE — TRACKER PATIENT NON-INVASIVE AXIEM 9734887XOM

## (undated) DEVICE — SU MONOCRYL 3-0 PS-1 27" Y936H

## (undated) DEVICE — COVER CAMERA IN-LIGHT DISP LT-C02

## (undated) DEVICE — DRAPE IOBAN INCISE 13X13" 6640EZ

## (undated) DEVICE — LABEL MEDICATION SYSTEM 3303-P

## (undated) DEVICE — LINEN TOWEL PACK X5 5464

## (undated) DEVICE — SUTURE BOOTS 051003PBX

## (undated) DEVICE — PREP POVIDONE-IODINE 7.5% SCRUB 4OZ BOTTLE MDS093945

## (undated) DEVICE — DECANTER VIAL 2006S

## (undated) DEVICE — SU VICRYL 2-0 CT-2 CR 8X18" J726D

## (undated) DEVICE — SUCTION MANIFOLD NEPTUNE 2 SYS 4 PORT 0702-020-000

## (undated) DEVICE — GLOVE BIOGEL PI MICRO INDICATOR UNDERGLOVE SZ 8.5 48985

## (undated) DEVICE — DRSG KERLIX 4 1/2"X4YDS ROLL 6715

## (undated) DEVICE — PREP SKIN SCRUB TRAY 4461A

## (undated) DEVICE — DRAPE POUCH IRR 1016

## (undated) DEVICE — ESU ELEC BLADE 2.75" COATED/INSULATED E1455

## (undated) DEVICE — DRAPE MAYO STAND 23X54 8337

## (undated) DEVICE — DRAPE POUCH INSTRUMENT 1018

## (undated) DEVICE — LINEN TOWEL PACK X6 WHITE 5487

## (undated) DEVICE — STPL SKIN 35W ROTATING HEAD PRW35

## (undated) DEVICE — PREP POVIDONE-IODINE 10% SOLUTION 4OZ BOTTLE MDS093944

## (undated) DEVICE — BLADE CLIPPER SGL USE 9680

## (undated) DEVICE — SYR 10ML FINGER CONTROL W/O NDL 309695

## (undated) DEVICE — RIGID LIGHT HANDLE

## (undated) DEVICE — PAD CHUX UNDERPAD 23X24" 7136

## (undated) DEVICE — DRAPE U SPLIT 74X120" 29440

## (undated) DEVICE — TUBING SUCTION MEDI-VAC SOFT 3/16"X20' N520A

## (undated) DEVICE — GLOVE BIOGEL PI MICRO SZ 8.0 48580

## (undated) DEVICE — OINTMENT ANTIBIOTIC BACITRACIN ZINC .9 G 1171

## (undated) DEVICE — ESU PENCIL SMOKE EVAC W/ROCKER SWITCH 0703-047-000

## (undated) DEVICE — ESU GROUND PAD ADULT W/CORD E7507

## (undated) RX ORDER — HYDROMORPHONE HYDROCHLORIDE 1 MG/ML
INJECTION, SOLUTION INTRAMUSCULAR; INTRAVENOUS; SUBCUTANEOUS
Status: DISPENSED
Start: 2024-08-15

## (undated) RX ORDER — GLYCOPYRROLATE 0.2 MG/ML
INJECTION, SOLUTION INTRAMUSCULAR; INTRAVENOUS
Status: DISPENSED
Start: 2024-08-15

## (undated) RX ORDER — METHOCARBAMOL 500 MG/1
TABLET, FILM COATED ORAL
Status: DISPENSED
Start: 2024-08-15

## (undated) RX ORDER — ACETAMINOPHEN 325 MG/1
TABLET ORAL
Status: DISPENSED
Start: 2024-08-15

## (undated) RX ORDER — HYDROMORPHONE HCL IN WATER/PF 6 MG/30 ML
PATIENT CONTROLLED ANALGESIA SYRINGE INTRAVENOUS
Status: DISPENSED
Start: 2024-08-15

## (undated) RX ORDER — ESMOLOL HYDROCHLORIDE 10 MG/ML
INJECTION INTRAVENOUS
Status: DISPENSED
Start: 2024-08-15

## (undated) RX ORDER — ONDANSETRON 2 MG/ML
INJECTION INTRAMUSCULAR; INTRAVENOUS
Status: DISPENSED
Start: 2024-08-15

## (undated) RX ORDER — CEFAZOLIN SODIUM 1 G/3ML
INJECTION, POWDER, FOR SOLUTION INTRAMUSCULAR; INTRAVENOUS
Status: DISPENSED
Start: 2024-08-15

## (undated) RX ORDER — SODIUM CHLORIDE 9 MG/ML
INJECTION, SOLUTION INTRAVENOUS
Status: DISPENSED
Start: 2024-08-15

## (undated) RX ORDER — CEFAZOLIN SODIUM/WATER 2 G/20 ML
SYRINGE (ML) INTRAVENOUS
Status: DISPENSED
Start: 2024-08-15

## (undated) RX ORDER — FENTANYL CITRATE-0.9 % NACL/PF 10 MCG/ML
PLASTIC BAG, INJECTION (ML) INTRAVENOUS
Status: DISPENSED
Start: 2024-08-15

## (undated) RX ORDER — LIDOCAINE HYDROCHLORIDE 10 MG/ML
INJECTION, SOLUTION EPIDURAL; INFILTRATION; INTRACAUDAL; PERINEURAL
Status: DISPENSED
Start: 2024-08-15

## (undated) RX ORDER — PROPOFOL 10 MG/ML
INJECTION, EMULSION INTRAVENOUS
Status: DISPENSED
Start: 2024-08-15

## (undated) RX ORDER — DEXAMETHASONE SODIUM PHOSPHATE 4 MG/ML
INJECTION, SOLUTION INTRA-ARTICULAR; INTRALESIONAL; INTRAMUSCULAR; INTRAVENOUS; SOFT TISSUE
Status: DISPENSED
Start: 2024-08-15

## (undated) RX ORDER — OXYCODONE HYDROCHLORIDE 10 MG/1
TABLET ORAL
Status: DISPENSED
Start: 2024-08-15

## (undated) RX ORDER — DIAZEPAM 10 MG/2ML
INJECTION, SOLUTION INTRAMUSCULAR; INTRAVENOUS
Status: DISPENSED
Start: 2024-08-15

## (undated) RX ORDER — BUPIVACAINE HYDROCHLORIDE AND EPINEPHRINE 2.5; 5 MG/ML; UG/ML
INJECTION, SOLUTION EPIDURAL; INFILTRATION; INTRACAUDAL; PERINEURAL
Status: DISPENSED
Start: 2024-08-15

## (undated) RX ORDER — LIDOCAINE HYDROCHLORIDE 10 MG/ML
INJECTION, SOLUTION EPIDURAL; INFILTRATION; INTRACAUDAL; PERINEURAL
Status: DISPENSED
Start: 2024-04-17